# Patient Record
Sex: MALE | Race: WHITE | NOT HISPANIC OR LATINO | URBAN - METROPOLITAN AREA
[De-identification: names, ages, dates, MRNs, and addresses within clinical notes are randomized per-mention and may not be internally consistent; named-entity substitution may affect disease eponyms.]

---

## 2023-03-02 LAB
ANION GAP IN SER/PLAS: 14 MMOL/L (ref 10–20)
CALCIUM (MG/DL) IN SER/PLAS: 9.6 MG/DL (ref 8.6–10.6)
CARBON DIOXIDE, TOTAL (MMOL/L) IN SER/PLAS: 29 MMOL/L (ref 21–32)
CHLORIDE (MMOL/L) IN SER/PLAS: 99 MMOL/L (ref 98–107)
CREATININE (MG/DL) IN SER/PLAS: 1.55 MG/DL (ref 0.5–1.3)
GFR MALE: 43 ML/MIN/1.73M2
GLUCOSE (MG/DL) IN SER/PLAS: 185 MG/DL (ref 74–99)
POTASSIUM (MMOL/L) IN SER/PLAS: 4.5 MMOL/L (ref 3.5–5.3)
SODIUM (MMOL/L) IN SER/PLAS: 137 MMOL/L (ref 136–145)
UREA NITROGEN (MG/DL) IN SER/PLAS: 66 MG/DL (ref 6–23)

## 2023-04-17 ENCOUNTER — APPOINTMENT (OUTPATIENT)
Dept: PRIMARY CARE | Facility: CLINIC | Age: 88
End: 2023-04-17
Payer: MEDICARE

## 2023-04-21 ENCOUNTER — OFFICE VISIT (OUTPATIENT)
Dept: PRIMARY CARE | Facility: CLINIC | Age: 88
End: 2023-04-21
Payer: MEDICARE

## 2023-04-21 VITALS
BODY MASS INDEX: 25.87 KG/M2 | WEIGHT: 191 LBS | HEART RATE: 68 BPM | DIASTOLIC BLOOD PRESSURE: 77 MMHG | OXYGEN SATURATION: 95 % | SYSTOLIC BLOOD PRESSURE: 146 MMHG | HEIGHT: 72 IN

## 2023-04-21 DIAGNOSIS — T81.31XS WOUND DEHISCENCE, SURGICAL, SEQUELA: ICD-10-CM

## 2023-04-21 DIAGNOSIS — I38 VALVULAR INCOMPETENCE: ICD-10-CM

## 2023-04-21 DIAGNOSIS — I50.42 CHRONIC COMBINED SYSTOLIC AND DIASTOLIC CONGESTIVE HEART FAILURE (MULTI): Primary | ICD-10-CM

## 2023-04-21 PROCEDURE — 99214 OFFICE O/P EST MOD 30 MIN: CPT | Performed by: FAMILY MEDICINE

## 2023-04-21 RX ORDER — TORSEMIDE 20 MG/1
40 TABLET ORAL 2 TIMES DAILY
COMMUNITY
End: 2023-10-08 | Stop reason: HOSPADM

## 2023-04-21 RX ORDER — POTASSIUM CHLORIDE 750 MG/1
1 TABLET, EXTENDED RELEASE ORAL DAILY
COMMUNITY
End: 2023-10-08 | Stop reason: HOSPADM

## 2023-04-21 RX ORDER — APIXABAN 2.5 MG/1
1 TABLET, FILM COATED ORAL 2 TIMES DAILY
COMMUNITY
Start: 2021-09-16

## 2023-04-21 RX ORDER — TAMSULOSIN HYDROCHLORIDE 0.4 MG/1
0.4 CAPSULE ORAL 2 TIMES DAILY
COMMUNITY

## 2023-04-21 ASSESSMENT — ENCOUNTER SYMPTOMS
WOUND: 1
RESPIRATORY NEGATIVE: 1
GASTROINTESTINAL NEGATIVE: 1
CONSTITUTIONAL NEGATIVE: 1

## 2023-04-21 NOTE — PROGRESS NOTES
Subjective   Patient ID: Rajednra Chavis is a 87 y.o. male who presents for Hospital Follow-up.  HPI  Chf valvuler heart dx patient has a history of congestive heart failure was recently in the hospital from fluid overload this was treated patient developed sores in bilateral legs at this point he is going to the wound clinic has a wound VAC in bilateral lower extremities.  Patient has no tachypnea at this point no chest pain no palpitations.  Patient's valvular heart disease is such that he is going to eventually need a TAVR procedure this can be done at  were not can to follow along with him at this point he is not in overt congestive heart failure  Review of Systems   Constitutional: Negative.    HENT: Negative.     Respiratory: Negative.     Gastrointestinal: Negative.    Skin:  Positive for wound.       Objective   Physical Exam  General no acute process no icterus well-hydrated alert active oriented    HEENT normocephalic no palpable tenderness eyes pupils equal reactive light and accommodation extraocular muscles intact no icterus and/or erythema ears benign external auditory canal no gross deformities nose no discharge drainage erythema bleeding throat no erythema.    Heart regular rate and rhythm without S3-S4 pos murmur    Lungs clear to auscultation x2 no rales or rhonchi    Abdomen soft nontender nondistended no palpable masses no organomegaly splenomegaly.    Integument no rash no lumps bumps or concerning lesions.    Neurologic no tics tremors or seizures no decreased range of motion or ataxia.    Musculoskeletal good range of motion no gross abnormalities noted    Patient has bilateral legs wrapped wound VAC in the right anterior aspect of the leg.  Assessment/Plan

## 2023-04-26 LAB
ALBUMIN (G/DL) IN SER/PLAS: 3.4 G/DL (ref 3.4–5)
ANION GAP IN SER/PLAS: 11 MMOL/L (ref 10–20)
CALCIUM (MG/DL) IN SER/PLAS: 9.2 MG/DL (ref 8.6–10.6)
CARBON DIOXIDE, TOTAL (MMOL/L) IN SER/PLAS: 32 MMOL/L (ref 21–32)
CHLORIDE (MMOL/L) IN SER/PLAS: 95 MMOL/L (ref 98–107)
CREATININE (MG/DL) IN SER/PLAS: 1.19 MG/DL (ref 0.5–1.3)
GFR MALE: 59 ML/MIN/1.73M2
GLUCOSE (MG/DL) IN SER/PLAS: 170 MG/DL (ref 74–99)
NATRIURETIC PEPTIDE B (PG/ML) IN SER/PLAS: 640 PG/ML (ref 0–99)
PHOSPHATE (MG/DL) IN SER/PLAS: 3.7 MG/DL (ref 2.5–4.9)
POTASSIUM (MMOL/L) IN SER/PLAS: 4.4 MMOL/L (ref 3.5–5.3)
SODIUM (MMOL/L) IN SER/PLAS: 134 MMOL/L (ref 136–145)
UREA NITROGEN (MG/DL) IN SER/PLAS: 46 MG/DL (ref 6–23)

## 2023-05-05 LAB
ALBUMIN (G/DL) IN SER/PLAS: 3.7 G/DL (ref 3.4–5)
ANION GAP IN SER/PLAS: 14 MMOL/L (ref 10–20)
CALCIUM (MG/DL) IN SER/PLAS: 9.7 MG/DL (ref 8.6–10.6)
CARBON DIOXIDE, TOTAL (MMOL/L) IN SER/PLAS: 32 MMOL/L (ref 21–32)
CHLORIDE (MMOL/L) IN SER/PLAS: 95 MMOL/L (ref 98–107)
CREATININE (MG/DL) IN SER/PLAS: 1.41 MG/DL (ref 0.5–1.3)
GFR MALE: 48 ML/MIN/1.73M2
GLUCOSE (MG/DL) IN SER/PLAS: 109 MG/DL (ref 74–99)
PHOSPHATE (MG/DL) IN SER/PLAS: 4.2 MG/DL (ref 2.5–4.9)
POTASSIUM (MMOL/L) IN SER/PLAS: 4.1 MMOL/L (ref 3.5–5.3)
SODIUM (MMOL/L) IN SER/PLAS: 137 MMOL/L (ref 136–145)
UREA NITROGEN (MG/DL) IN SER/PLAS: 58 MG/DL (ref 6–23)

## 2023-05-08 ENCOUNTER — HOSPITAL ENCOUNTER (OUTPATIENT)
Dept: DATA CONVERSION | Facility: HOSPITAL | Age: 88
End: 2023-05-08
Attending: INTERNAL MEDICINE | Admitting: INTERNAL MEDICINE
Payer: MEDICARE

## 2023-05-08 DIAGNOSIS — I36.1 NONRHEUMATIC TRICUSPID (VALVE) INSUFFICIENCY: ICD-10-CM

## 2023-05-08 DIAGNOSIS — Z79.01 LONG TERM (CURRENT) USE OF ANTICOAGULANTS: ICD-10-CM

## 2023-05-08 DIAGNOSIS — I38 ENDOCARDITIS, VALVE UNSPECIFIED: ICD-10-CM

## 2023-05-08 DIAGNOSIS — I48.91 UNSPECIFIED ATRIAL FIBRILLATION (MULTI): ICD-10-CM

## 2023-05-08 DIAGNOSIS — E78.5 HYPERLIPIDEMIA, UNSPECIFIED: ICD-10-CM

## 2023-05-08 DIAGNOSIS — I35.1 NONRHEUMATIC AORTIC (VALVE) INSUFFICIENCY: ICD-10-CM

## 2023-05-08 DIAGNOSIS — R07.9 CHEST PAIN, UNSPECIFIED: ICD-10-CM

## 2023-05-08 DIAGNOSIS — I25.10 ATHEROSCLEROTIC HEART DISEASE OF NATIVE CORONARY ARTERY WITHOUT ANGINA PECTORIS: ICD-10-CM

## 2023-05-08 DIAGNOSIS — Z87.891 PERSONAL HISTORY OF NICOTINE DEPENDENCE: ICD-10-CM

## 2023-05-08 DIAGNOSIS — I73.9 PERIPHERAL VASCULAR DISEASE, UNSPECIFIED (CMS-HCC): ICD-10-CM

## 2023-05-08 DIAGNOSIS — I34.0 NONRHEUMATIC MITRAL (VALVE) INSUFFICIENCY: ICD-10-CM

## 2023-05-08 DIAGNOSIS — I10 ESSENTIAL (PRIMARY) HYPERTENSION: ICD-10-CM

## 2023-05-08 LAB
ACTIVATED PARTIAL THROMBOPLASTIN TIME IN PPP BY COAGULATION ASSAY: 29 SEC (ref 26–39)
ANION GAP IN SER/PLAS: 13 MMOL/L (ref 10–20)
APPARATUS: ABNORMAL
APPARATUS: NORMAL
CALCIUM (MG/DL) IN SER/PLAS: 9 MG/DL (ref 8.6–10.3)
CARBON DIOXIDE, TOTAL (MMOL/L) IN SER/PLAS: 28 MMOL/L (ref 21–32)
CHLORIDE (MMOL/L) IN SER/PLAS: 97 MMOL/L (ref 98–107)
CREATININE (MG/DL) IN SER/PLAS: 1.3 MG/DL (ref 0.5–1.3)
ERYTHROCYTE DISTRIBUTION WIDTH (RATIO) BY AUTOMATED COUNT: 16.5 % (ref 11.5–14.5)
ERYTHROCYTE MEAN CORPUSCULAR HEMOGLOBIN CONCENTRATION (G/DL) BY AUTOMATED: 32 G/DL (ref 32–36)
ERYTHROCYTE MEAN CORPUSCULAR VOLUME (FL) BY AUTOMATED COUNT: 90 FL (ref 80–100)
ERYTHROCYTES (10*6/UL) IN BLOOD BY AUTOMATED COUNT: 4.07 X10E12/L (ref 4.5–5.9)
FIO2: 28 %
FIO2: 28 % (ref 21–100)
GFR MALE: 53 ML/MIN/1.73M2
GLUCOSE (MG/DL) IN SER/PLAS: 99 MG/DL (ref 74–99)
HEMATOCRIT (%) IN BLOOD BY AUTOMATED COUNT: 36.6 % (ref 41–52)
HEMOGLOBIN (G/DL) IN BLOOD: 11.7 G/DL (ref 13.5–17.5)
INR IN PPP BY COAGULATION ASSAY: 1.5 (ref 0.9–1.1)
LEUKOCYTES (10*3/UL) IN BLOOD BY AUTOMATED COUNT: 8.9 X10E9/L (ref 4.4–11.3)
NRBC (PER 100 WBCS) BY AUTOMATED COUNT: 0 /100 WBC (ref 0–0)
PATIENT TEMPERATURE: 37 DEGREES
PATIENT TEMPERATURE: 37 DEGREES C
PLATELETS (10*3/UL) IN BLOOD AUTOMATED COUNT: 177 X10E9/L (ref 150–450)
POCT BASE EXCESS, ARTERIAL: 6.1 MMOL/L (ref -2–3)
POCT BASE EXCESS, MIXED: 6.9 MMOL/L
POCT HCO3, ARTERIAL: 29.6 MMOL/L (ref 22–26)
POCT HCO3, MIXED: 32.5 MMOL/L
POCT OXY HEMOGLOBIN, ARTERIAL: 94.5 % (ref 94–98)
POCT OXY HEMOGLOBIN, MIXED: 60.4 % (ref 45–75)
POCT PCO2, ARTERIAL: 38 MMHG (ref 38–42)
POCT PCO2, MIXED: 49 MMHG
POCT PH, ARTERIAL: 7.5 (ref 7.38–7.42)
POCT PH, MIXED: 7.43
POCT PO2, ARTERIAL: 78 MMHG (ref 85–95)
POCT PO2, MIXED: 38 MMHG
POCT SO2, ARTERIAL: 97 % (ref 94–100)
POCT SO2, MIXED: 62 %
POTASSIUM (MMOL/L) IN SER/PLAS: 4.4 MMOL/L (ref 3.5–5.3)
PROTHROMBIN TIME (PT) IN PPP BY COAGULATION ASSAY: 17.5 SEC (ref 9.8–13.4)
SITE OF ARTERIAL PUNCTURE: ABNORMAL
SODIUM (MMOL/L) IN SER/PLAS: 134 MMOL/L (ref 136–145)
UREA NITROGEN (MG/DL) IN SER/PLAS: 50 MG/DL (ref 6–23)

## 2023-05-14 LAB
ATRIAL RATE: 288 BPM
Q ONSET: 207 MS
QRS COUNT: 10 BEATS
QRS DURATION: 110 MS
QT INTERVAL: 458 MS
QTC CALCULATION(BAZETT): 461 MS
QTC FREDERICIA: 460 MS
R AXIS: -57 DEGREES
T AXIS: 70 DEGREES
T OFFSET: 436 MS
VENTRICULAR RATE: 61 BPM

## 2023-06-14 ENCOUNTER — TELEPHONE (OUTPATIENT)
Dept: PRIMARY CARE | Facility: CLINIC | Age: 88
End: 2023-06-14
Payer: MEDICARE

## 2023-06-14 NOTE — TELEPHONE ENCOUNTER
Patient's daughter called. They would like a referral to Audiology for hearing test for patient. Also would like to know if there is a specific Dr you recommend.

## 2023-06-20 LAB
ALBUMIN (G/DL) IN SER/PLAS: 3.6 G/DL (ref 3.4–5)
ANION GAP IN SER/PLAS: 12 MMOL/L (ref 10–20)
CALCIUM (MG/DL) IN SER/PLAS: 9.4 MG/DL (ref 8.6–10.6)
CARBON DIOXIDE, TOTAL (MMOL/L) IN SER/PLAS: 31 MMOL/L (ref 21–32)
CHLORIDE (MMOL/L) IN SER/PLAS: 97 MMOL/L (ref 98–107)
CREATININE (MG/DL) IN SER/PLAS: 1.33 MG/DL (ref 0.5–1.3)
GFR MALE: 51 ML/MIN/1.73M2
GLUCOSE (MG/DL) IN SER/PLAS: 123 MG/DL (ref 74–99)
NATRIURETIC PEPTIDE B (PG/ML) IN SER/PLAS: 570 PG/ML (ref 0–99)
PHOSPHATE (MG/DL) IN SER/PLAS: 3.7 MG/DL (ref 2.5–4.9)
POTASSIUM (MMOL/L) IN SER/PLAS: 3.7 MMOL/L (ref 3.5–5.3)
SODIUM (MMOL/L) IN SER/PLAS: 136 MMOL/L (ref 136–145)
UREA NITROGEN (MG/DL) IN SER/PLAS: 57 MG/DL (ref 6–23)

## 2023-07-24 ENCOUNTER — HOSPITAL ENCOUNTER (OUTPATIENT)
Dept: DATA CONVERSION | Facility: HOSPITAL | Age: 88
End: 2023-07-24
Attending: SURGERY | Admitting: SURGERY
Payer: MEDICARE

## 2023-07-24 DIAGNOSIS — L97.819 NON-PRESSURE CHRONIC ULCER OF OTHER PART OF RIGHT LOWER LEG WITH UNSPECIFIED SEVERITY (MULTI): ICD-10-CM

## 2023-07-24 DIAGNOSIS — L97.919 NON-PRESSURE CHRONIC ULCER OF UNSPECIFIED PART OF RIGHT LOWER LEG WITH UNSPECIFIED SEVERITY (MULTI): ICD-10-CM

## 2023-09-14 NOTE — H&P
History & Physical Reviewed:   I have reviewed the History and Physical dated:  24-Apr-2023   History and Physical reviewed and relevant findings noted. Patient examined to review pertinent physical  findings.: No significant changes   Home Medications Reviewed: no changes noted   Allergies Reviewed: no changes noted       Airway/Sedation Assessment:  ·  Emotional Status calm   ·  Neurologic alert & oriented x 3   ·  Respiratory clear to auscultation   ·  Cardiovascular Afib, HR 61.   ·  GI/ soft, nontender     · Pulses present: Radial Left, Radial Right    AS [facility] phys assess pulse FT DRSG to ju LE.     ·  Mouth Opening OK yes   ·  Neck Flexibility OK yes   ·  Loose Teeth no     Oropharyngeal Classification:          ·  Oropharyngeal Classification Class II   ·  ASA PS Classification ASA III   ·  Sedation Plan moderate sedation       ERAS (Enhanced Recovery After Surgery):  ·  ERAS Patient: no     Consent:   COVID-19 Consent:  ·  COVID-19 Risk Consent Surgeon has reviewed key risks related to the risk of jordana COVID-19 and if they contract COVID-19 what the risks are.     Coronavirus Attestation of Need for Surgery:  ·  COVID-19 Surgery / Procedure Attestation: Select all criteria that apply Presence of severe symptoms causing an inability to perform activities of daily living       Electronic Signatures:  Jennifer Day (APRN-CNP)  (Signed 08-May-2023 08:16)   Authored: History & Physical Reviewed, Airway/Sedation,  ERAS, Consent, Note Completion  Vinicius Jiménez)  (Signed 09-May-2023 03:42)   Authored: Consent   Co-Signer: History & Physical Reviewed, Airway/Sedation, ERAS, Consent, Note Completion      Last Updated: 09-May-2023 03:42 by Vinicius Jiménez)

## 2023-09-21 ENCOUNTER — HOSPITAL ENCOUNTER (OUTPATIENT)
Facility: HOSPITAL | Age: 88
Setting detail: SURGERY ADMIT
End: 2023-09-21
Attending: INTERNAL MEDICINE | Admitting: INTERNAL MEDICINE
Payer: MEDICARE

## 2023-09-29 ENCOUNTER — PREP FOR PROCEDURE (OUTPATIENT)
Dept: CARDIOLOGY | Facility: HOSPITAL | Age: 88
End: 2023-09-29
Payer: MEDICARE

## 2023-09-30 NOTE — H&P
History & Physical Reviewed:   I have reviewed the History and Physical dated:  21-Jul-2023   History and Physical reviewed and relevant findings noted. Patient examined to review pertinent physical  findings.: No significant changes   Home Medications Reviewed: no changes noted   Allergies Reviewed: no changes noted       ERAS (Enhanced Recovery After Surgery):  ·  ERAS Patient: no     Consent:   COVID-19 Consent:  ·  COVID-19 Risk Consent Surgeon has reviewed key risks related to the risk of jordana COVID-19 and if they contract COVID-19 what the risks are.       Electronic Signatures:  Jean Benjamin ()  (Signed 24-Jul-2023 10:10)   Authored: History & Physical Reviewed, ERAS, Consent,  Note Completion      Last Updated: 24-Jul-2023 10:10 by Jean Benjamin ()

## 2023-10-02 NOTE — OP NOTE
Post Operative Note:     Post-Procedure Diagnosis: Chronic right lower extremity  wound   Procedure: 1.  Excisional debridement of right lower  lateral leg ulcer for preparation for graft (9 cm x 5.5 cm)  2.  Placement of myriad graft 7 cm x 10 cm right lower leg ulcer  3.   4.   5.   Surgeon: Dr. Benjamin   Resident/Fellow/Other Assistant: Raysa rader   Anesthesia: MAC   Estimated Blood Loss (mL): Minimal   Specimen: yes. Slough   Findings: Same       Electronic Signatures:  Jean Benjamin ()  (Signed 24-Jul-2023 11:32)   Authored: Post Operative Note, Note Completion      Last Updated: 24-Jul-2023 11:32 by Jean Benjamin ()

## 2023-10-03 ENCOUNTER — TELEPHONE (OUTPATIENT)
Dept: CARDIOLOGY | Facility: CLINIC | Age: 88
End: 2023-10-03
Payer: MEDICARE

## 2023-10-03 NOTE — TELEPHONE ENCOUNTER
"Daughter, Purnima Long called stating patient has had an overall decline AEB: decreased appetite, increased sleeping \"all day\", frequent episodes of diarrhea. Dtr feels abdomen is increasing in size \"bloated-like\". Patient's weight remains same, 203#. Patient goals of care have not changed and pt/family would like to proceed with valve repair scheduled on 10/19 and are concerned it will be delayed due to leg wound. Stated patient has appt with Dr. Andres 10/4.  "

## 2023-10-04 ENCOUNTER — CLINICAL SUPPORT (OUTPATIENT)
Dept: WOUND CARE | Facility: CLINIC | Age: 88
DRG: 293 | End: 2023-10-04
Payer: MEDICARE

## 2023-10-04 PROCEDURE — 99213 OFFICE O/P EST LOW 20 MIN: CPT

## 2023-10-05 ENCOUNTER — HOSPITAL ENCOUNTER (INPATIENT)
Facility: HOSPITAL | Age: 88
LOS: 2 days | Discharge: ANOTHER HOSPITAL | DRG: 293 | End: 2023-10-08
Attending: EMERGENCY MEDICINE | Admitting: INTERNAL MEDICINE
Payer: MEDICARE

## 2023-10-05 ENCOUNTER — HOSPITAL ENCOUNTER (OUTPATIENT)
Dept: RADIOLOGY | Facility: HOSPITAL | Age: 88
Discharge: HOME | DRG: 293 | End: 2023-10-05
Payer: MEDICARE

## 2023-10-05 DIAGNOSIS — I38 HEART FAILURE DUE TO VALVULAR DISEASE, UNSPECIFIED HEART FAILURE TYPE (MULTI): Primary | ICD-10-CM

## 2023-10-05 DIAGNOSIS — I50.9 HEART FAILURE DUE TO VALVULAR DISEASE, UNSPECIFIED HEART FAILURE TYPE (MULTI): Primary | ICD-10-CM

## 2023-10-05 DIAGNOSIS — I50.43 ACUTE ON CHRONIC COMBINED SYSTOLIC AND DIASTOLIC HEART FAILURE (MULTI): Primary | ICD-10-CM

## 2023-10-05 PROBLEM — I35.9 AORTIC VALVE DISORDER: Status: ACTIVE | Noted: 2023-03-21

## 2023-10-05 PROBLEM — I83.002: Status: ACTIVE | Noted: 2023-10-05

## 2023-10-05 PROBLEM — I34.0 NONRHEUMATIC MITRAL VALVE REGURGITATION: Status: ACTIVE | Noted: 2023-10-05

## 2023-10-05 PROBLEM — L03.90 CELLULITIS: Status: ACTIVE | Noted: 2023-10-05

## 2023-10-05 PROBLEM — I87.2 CHRONIC VENOUS INSUFFICIENCY: Status: ACTIVE | Noted: 2023-10-05

## 2023-10-05 PROBLEM — F41.9 ANXIETY AND DEPRESSION: Status: ACTIVE | Noted: 2023-10-05

## 2023-10-05 PROBLEM — I05.9 MITRAL VALVE DISEASE: Status: ACTIVE | Noted: 2023-03-21

## 2023-10-05 PROBLEM — L60.0 ONYCHOCRYPTOSIS: Status: ACTIVE | Noted: 2023-10-05

## 2023-10-05 PROBLEM — I07.1 TRICUSPID REGURGITATION: Status: ACTIVE | Noted: 2023-10-05

## 2023-10-05 PROBLEM — N18.30 CHRONIC KIDNEY DISEASE, STAGE 3 UNSPECIFIED (MULTI): Status: ACTIVE | Noted: 2023-03-29

## 2023-10-05 PROBLEM — L97.909 ULCER OF LOWER EXTREMITY (MULTI): Status: ACTIVE | Noted: 2023-10-05

## 2023-10-05 PROBLEM — L03.116 CELLULITIS OF LEFT LOWER LIMB: Status: ACTIVE | Noted: 2023-03-29

## 2023-10-05 PROBLEM — I48.91 ATRIAL FIBRILLATION (MULTI): Status: ACTIVE | Noted: 2023-03-29

## 2023-10-05 PROBLEM — R79.89 ELEVATED BRAIN NATRIURETIC PEPTIDE (BNP) LEVEL: Status: ACTIVE | Noted: 2023-10-05

## 2023-10-05 PROBLEM — N40.0 BENIGN PROSTATE HYPERPLASIA: Status: ACTIVE | Noted: 2023-10-05

## 2023-10-05 PROBLEM — F32.A ANXIETY AND DEPRESSION: Status: ACTIVE | Noted: 2023-10-05

## 2023-10-05 PROBLEM — L97.209: Status: ACTIVE | Noted: 2023-10-05

## 2023-10-05 PROBLEM — F33.9 MAJOR DEPRESSIVE DISORDER, RECURRENT, UNSPECIFIED (CMS-HCC): Status: ACTIVE | Noted: 2023-03-29

## 2023-10-05 PROBLEM — I35.1 MODERATE AORTIC REGURGITATION: Status: ACTIVE | Noted: 2023-10-05

## 2023-10-05 PROBLEM — I73.9 PAD (PERIPHERAL ARTERY DISEASE) (CMS-HCC): Status: ACTIVE | Noted: 2023-10-05

## 2023-10-05 LAB
ALBUMIN SERPL BCP-MCNC: 3.2 G/DL (ref 3.4–5)
ALP SERPL-CCNC: 102 U/L (ref 33–136)
ALT SERPL W P-5'-P-CCNC: 14 U/L (ref 10–52)
ANION GAP SERPL CALC-SCNC: 15 MMOL/L (ref 10–20)
APTT PPP: 43 SECONDS (ref 27–38)
AST SERPL W P-5'-P-CCNC: 23 U/L (ref 9–39)
BASOPHILS # BLD AUTO: 0.06 X10*3/UL (ref 0–0.1)
BASOPHILS NFR BLD AUTO: 0.9 %
BILIRUB DIRECT SERPL-MCNC: 0.7 MG/DL (ref 0–0.3)
BILIRUB SERPL-MCNC: 1.6 MG/DL (ref 0–1.2)
BNP SERPL-MCNC: 506 PG/ML (ref 0–99)
BUN SERPL-MCNC: 77 MG/DL (ref 6–23)
BURR CELLS BLD QL SMEAR: NORMAL
CALCIUM SERPL-MCNC: 9.1 MG/DL (ref 8.6–10.3)
CARDIAC TROPONIN I PNL SERPL HS: 22 NG/L (ref 0–20)
CHLORIDE SERPL-SCNC: 98 MMOL/L (ref 98–107)
CO2 SERPL-SCNC: 26 MMOL/L (ref 21–32)
CREAT SERPL-MCNC: 1.47 MG/DL (ref 0.5–1.3)
EOSINOPHIL # BLD AUTO: 0.05 X10*3/UL (ref 0–0.4)
EOSINOPHIL NFR BLD AUTO: 0.7 %
ERYTHROCYTE [DISTWIDTH] IN BLOOD BY AUTOMATED COUNT: 21.7 % (ref 11.5–14.5)
GFR SERPL CREATININE-BSD FRML MDRD: 46 ML/MIN/1.73M*2
GLUCOSE SERPL-MCNC: 79 MG/DL (ref 74–99)
HCT VFR BLD AUTO: 40.1 % (ref 41–52)
HGB BLD-MCNC: 12.3 G/DL (ref 13.5–17.5)
IMM GRANULOCYTES # BLD AUTO: 0.02 X10*3/UL (ref 0–0.5)
IMM GRANULOCYTES NFR BLD AUTO: 0.3 % (ref 0–0.9)
INR PPP: 2.3 (ref 0.9–1.1)
LIPASE SERPL-CCNC: 78 U/L (ref 9–82)
LYMPHOCYTES # BLD AUTO: 1.39 X10*3/UL (ref 0.8–3)
LYMPHOCYTES NFR BLD AUTO: 20.3 %
MAGNESIUM SERPL-MCNC: 2.23 MG/DL (ref 1.6–2.4)
MCH RBC QN AUTO: 23.2 PG (ref 26–34)
MCHC RBC AUTO-ENTMCNC: 30.7 G/DL (ref 32–36)
MCV RBC AUTO: 76 FL (ref 80–100)
MONOCYTES # BLD AUTO: 0.82 X10*3/UL (ref 0.05–0.8)
MONOCYTES NFR BLD AUTO: 12 %
NEUTROPHILS # BLD AUTO: 4.5 X10*3/UL (ref 1.6–5.5)
NEUTROPHILS NFR BLD AUTO: 65.8 %
NRBC BLD-RTO: 0.3 /100 WBCS (ref 0–0)
OVALOCYTES BLD QL SMEAR: NORMAL
PLATELET # BLD AUTO: 93 X10*3/UL (ref 150–450)
PMV BLD AUTO: ABNORMAL FL
POTASSIUM SERPL-SCNC: 4 MMOL/L (ref 3.5–5.3)
PROT SERPL-MCNC: 7 G/DL (ref 6.4–8.2)
PROTHROMBIN TIME: 26.1 SECONDS (ref 9.8–12.8)
RBC # BLD AUTO: 5.31 X10*6/UL (ref 4.5–5.9)
RBC MORPH BLD: NORMAL
SCHISTOCYTES BLD QL SMEAR: NORMAL
SODIUM SERPL-SCNC: 135 MMOL/L (ref 136–145)
WBC # BLD AUTO: 6.8 X10*3/UL (ref 4.4–11.3)

## 2023-10-05 PROCEDURE — 2500000004 HC RX 250 GENERAL PHARMACY W/ HCPCS (ALT 636 FOR OP/ED): Performed by: EMERGENCY MEDICINE

## 2023-10-05 PROCEDURE — 36415 COLL VENOUS BLD VENIPUNCTURE: CPT | Performed by: NURSE PRACTITIONER

## 2023-10-05 PROCEDURE — 71046 X-RAY EXAM CHEST 2 VIEWS: CPT | Performed by: RADIOLOGY

## 2023-10-05 PROCEDURE — 2500000001 HC RX 250 WO HCPCS SELF ADMINISTERED DRUGS (ALT 637 FOR MEDICARE OP): Performed by: EMERGENCY MEDICINE

## 2023-10-05 PROCEDURE — 82248 BILIRUBIN DIRECT: CPT | Performed by: NURSE PRACTITIONER

## 2023-10-05 PROCEDURE — 85025 COMPLETE CBC W/AUTO DIFF WBC: CPT | Performed by: NURSE PRACTITIONER

## 2023-10-05 PROCEDURE — 96374 THER/PROPH/DIAG INJ IV PUSH: CPT

## 2023-10-05 PROCEDURE — 96376 TX/PRO/DX INJ SAME DRUG ADON: CPT

## 2023-10-05 PROCEDURE — 93971 EXTREMITY STUDY: CPT

## 2023-10-05 PROCEDURE — 83690 ASSAY OF LIPASE: CPT | Performed by: NURSE PRACTITIONER

## 2023-10-05 PROCEDURE — 84484 ASSAY OF TROPONIN QUANT: CPT | Performed by: NURSE PRACTITIONER

## 2023-10-05 PROCEDURE — 80048 BASIC METABOLIC PNL TOTAL CA: CPT | Performed by: NURSE PRACTITIONER

## 2023-10-05 PROCEDURE — 93971 EXTREMITY STUDY: CPT | Performed by: SURGERY

## 2023-10-05 PROCEDURE — 85730 THROMBOPLASTIN TIME PARTIAL: CPT | Performed by: NURSE PRACTITIONER

## 2023-10-05 PROCEDURE — 85610 PROTHROMBIN TIME: CPT | Performed by: NURSE PRACTITIONER

## 2023-10-05 PROCEDURE — 83735 ASSAY OF MAGNESIUM: CPT | Performed by: NURSE PRACTITIONER

## 2023-10-05 PROCEDURE — 83880 ASSAY OF NATRIURETIC PEPTIDE: CPT | Performed by: NURSE PRACTITIONER

## 2023-10-05 PROCEDURE — 99285 EMERGENCY DEPT VISIT HI MDM: CPT | Mod: 25 | Performed by: EMERGENCY MEDICINE

## 2023-10-05 RX ORDER — TAMSULOSIN HYDROCHLORIDE 0.4 MG/1
0.4 CAPSULE ORAL ONCE
Status: COMPLETED | OUTPATIENT
Start: 2023-10-05 | End: 2023-10-05

## 2023-10-05 RX ORDER — TAMSULOSIN HYDROCHLORIDE 0.4 MG/1
0.4 CAPSULE ORAL 2 TIMES DAILY
Status: DISCONTINUED | OUTPATIENT
Start: 2023-10-06 | End: 2023-10-08 | Stop reason: HOSPADM

## 2023-10-05 RX ORDER — FUROSEMIDE 10 MG/ML
40 INJECTION INTRAMUSCULAR; INTRAVENOUS ONCE
Status: COMPLETED | OUTPATIENT
Start: 2023-10-05 | End: 2023-10-05

## 2023-10-05 RX ORDER — VIT C/E/ZN/COPPR/LUTEIN/ZEAXAN 250MG-90MG
1 CAPSULE ORAL DAILY
COMMUNITY

## 2023-10-05 RX ADMIN — APIXABAN 2.5 MG: 5 TABLET, FILM COATED ORAL at 19:41

## 2023-10-05 RX ADMIN — TAMSULOSIN HYDROCHLORIDE 0.4 MG: 0.4 CAPSULE ORAL at 19:41

## 2023-10-05 RX ADMIN — FUROSEMIDE 40 MG: 10 INJECTION, SOLUTION INTRAMUSCULAR; INTRAVENOUS at 19:41

## 2023-10-05 ASSESSMENT — COLUMBIA-SUICIDE SEVERITY RATING SCALE - C-SSRS
1. IN THE PAST MONTH, HAVE YOU WISHED YOU WERE DEAD OR WISHED YOU COULD GO TO SLEEP AND NOT WAKE UP?: NO
6. HAVE YOU EVER DONE ANYTHING, STARTED TO DO ANYTHING, OR PREPARED TO DO ANYTHING TO END YOUR LIFE?: NO
2. HAVE YOU ACTUALLY HAD ANY THOUGHTS OF KILLING YOURSELF?: NO

## 2023-10-05 ASSESSMENT — PAIN SCALES - GENERAL: PAINLEVEL_OUTOF10: 0 - NO PAIN

## 2023-10-05 ASSESSMENT — PAIN - FUNCTIONAL ASSESSMENT: PAIN_FUNCTIONAL_ASSESSMENT: 0-10

## 2023-10-05 NOTE — ED TRIAGE NOTES
This patient was seen and examined in triage    HPI:  Patient is a nontoxic-appearing 88-year-old male with past medical history of anxiety depression, aortic valve disorder, mitral valve disease, tricuspid regurgitation, atrial fibrillation, BPH, cellulitis, stage III chronic kidney disease, congestive heart failure, nonrheumatic mitral valve regurgitation, presents to the emergency room today for complaint of shortness of breath and abdominal swelling.  Family is at the bedside and states they were told by patient's cardiologist to go to the emergency room for further evaluation of cardiac disease.  Patient's family states patient has been taking torsemide, Eliquis Flomax and potassium however swelling to the abdomen has persisted.  Patient denies any chest pain.  Patient does complain of shortness of breath is worse with exertion.  Patient denies any abdominal pain, nausea vomiting, diarrhea or constipation, fever, shaking, or chills.    Focused PE:  Cardiac: Regular rate and rhythm  Pulmonary: No adventitious lung sounds auscultated  Abdomen: No reproducible abdominal pain note, +1 pitting edema diffusely across the abdomen, physical exam may be limited by patient positioning sitting up in a chair    Plan:  Cardiac evaluation ordered from triage.    For the remainder the patient's work-up and ED course, please see the main ED provider note.  We discussed need for diagnostic testing including lab studies and imaging.  We also discussed that they may be asked to wait in the waiting room while these test are pending.  They understand that if they choose to leave without having the testing completed or resulted that we cannot rule out acute life-threatening illnesses and the risks involved to lead to worsening condition, permanent disability or even death.

## 2023-10-05 NOTE — ED PROVIDER NOTES
Limitations to History: None     HPI:      Rajendra Chavis is a 88 y.o. with a past medical history of anxiety depression, aortic valve disorder, mitral valve disease, tricuspid regurgitation, atrial fibrillation, BPH, cellulitis, stage III chronic kidney disease, congestive heart failure, nonrheumatic mitral valve regurgitation who presents with increased SOB, fatigue and abdominal swelling. He was sent in from home by his cardiologist for evaluation and [place] [facility] [place] of [place] heart disease prior to mitral valve clip. He also has developed diarrhea this week. He does not talk much to me but says he does feel more tired than normal. He says he has been peeing when he takes his water pill.     His daughters are at bedside and say the pt's wife reports more dyspnea on exertion, diarrhea, and new RUE swelling.     Additional History Obtained from: Daughters    ------------------------------------------------------------------------------------------------------------------------------------------    VS: As documented in the triage note and EMR flowsheet from this visit were reviewed.    Physical Exam:  Gen: Alert, NAD  Head/Neck: NCAT, neck w/ FROM  Mouth:  MMM, no OP lesions noted  Heart: RRR no MRG  Lungs: CTA b/l no RRW, no increased work of breathing  Abdomen: soft, NT, +distended, no HSM, no palpable masses  Musculoskeletal: no joint swelling noted  Extremities: +2 pitting edema to the mid thigh, RUE swelling to the hand with good pulses and perfusion  Neurologic: Alert, symmetrical facies, phonates clearly, moves all extremities equally    ------------------------------------------------------------------------------------------------------------------------------------------    Medical Decision Making: Patient presents emergency room with concern for acute heart failure exacerbation and right upper extremity swelling.  Right upper extremity DVT ultrasound does not show any acute abnormalities.   He he was excepted for transfer for optimization for his mitral valve surgery to [facility].  He was excepted almost immediately by Dr. Flynn, there is no bed so home medications were placed and the patient was started on diuresis here in the emergency department.  He will be signed out pending transport to [facility].         EKG interpreted by myself (ED attending physician): Patient is in A-fib with a bradycardic rhythm around 61, he has a left bundle branch block and normal ST segments otherwise    Chronic Medical Conditions Significantly Affecting Care: Mitral valve disease, atrial fibrillation, heart failure    External Records Reviewed: I reviewed recent and relevant outside records including: Recent cardiology notes by Dr. Mack    Discussion of Management with Other Providers:   I discussed the patient/results with: Transfer center and main [place] cardiology       Dale Ponce MD  10/06/23 0054

## 2023-10-06 PROBLEM — I50.43 ACUTE ON CHRONIC COMBINED SYSTOLIC AND DIASTOLIC HEART FAILURE (MULTI): Status: ACTIVE | Noted: 2023-10-06

## 2023-10-06 LAB
ALBUMIN SERPL BCP-MCNC: 3 G/DL (ref 3.4–5)
ALP SERPL-CCNC: 102 U/L (ref 33–136)
ALT SERPL W P-5'-P-CCNC: 15 U/L (ref 10–52)
ANION GAP SERPL CALC-SCNC: 12 MMOL/L (ref 10–20)
AST SERPL W P-5'-P-CCNC: 24 U/L (ref 9–39)
BILIRUB SERPL-MCNC: 1.3 MG/DL (ref 0–1.2)
BUN SERPL-MCNC: 75 MG/DL (ref 6–23)
CALCIUM SERPL-MCNC: 9 MG/DL (ref 8.6–10.3)
CARDIAC TROPONIN I PNL SERPL HS: 21 NG/L (ref 0–20)
CHLORIDE SERPL-SCNC: 99 MMOL/L (ref 98–107)
CO2 SERPL-SCNC: 28 MMOL/L (ref 21–32)
CREAT SERPL-MCNC: 1.51 MG/DL (ref 0.5–1.3)
ERYTHROCYTE [DISTWIDTH] IN BLOOD BY AUTOMATED COUNT: 21.3 % (ref 11.5–14.5)
GFR SERPL CREATININE-BSD FRML MDRD: 44 ML/MIN/1.73M*2
GLUCOSE SERPL-MCNC: 123 MG/DL (ref 74–99)
HCT VFR BLD AUTO: 38.2 % (ref 41–52)
HGB BLD-MCNC: 11.8 G/DL (ref 13.5–17.5)
MAGNESIUM SERPL-MCNC: 2.38 MG/DL (ref 1.6–2.4)
MCH RBC QN AUTO: 23.2 PG (ref 26–34)
MCHC RBC AUTO-ENTMCNC: 30.9 G/DL (ref 32–36)
MCV RBC AUTO: 75 FL (ref 80–100)
NRBC BLD-RTO: 0.3 /100 WBCS (ref 0–0)
PLATELET # BLD AUTO: 84 X10*3/UL (ref 150–450)
PMV BLD AUTO: ABNORMAL FL
POTASSIUM SERPL-SCNC: 4.2 MMOL/L (ref 3.5–5.3)
PROT SERPL-MCNC: 6.6 G/DL (ref 6.4–8.2)
RBC # BLD AUTO: 5.09 X10*6/UL (ref 4.5–5.9)
SODIUM SERPL-SCNC: 135 MMOL/L (ref 136–145)
WBC # BLD AUTO: 6.3 X10*3/UL (ref 4.4–11.3)

## 2023-10-06 PROCEDURE — 85027 COMPLETE CBC AUTOMATED: CPT | Performed by: INTERNAL MEDICINE

## 2023-10-06 PROCEDURE — 2500000004 HC RX 250 GENERAL PHARMACY W/ HCPCS (ALT 636 FOR OP/ED): Performed by: INTERNAL MEDICINE

## 2023-10-06 PROCEDURE — 84484 ASSAY OF TROPONIN QUANT: CPT | Performed by: INTERNAL MEDICINE

## 2023-10-06 PROCEDURE — 2060000001 HC INTERMEDIATE ICU ROOM DAILY

## 2023-10-06 PROCEDURE — 2500000004 HC RX 250 GENERAL PHARMACY W/ HCPCS (ALT 636 FOR OP/ED): Performed by: STUDENT IN AN ORGANIZED HEALTH CARE EDUCATION/TRAINING PROGRAM

## 2023-10-06 PROCEDURE — 99223 1ST HOSP IP/OBS HIGH 75: CPT | Performed by: INTERNAL MEDICINE

## 2023-10-06 PROCEDURE — 80053 COMPREHEN METABOLIC PANEL: CPT | Performed by: INTERNAL MEDICINE

## 2023-10-06 PROCEDURE — 36415 COLL VENOUS BLD VENIPUNCTURE: CPT | Performed by: INTERNAL MEDICINE

## 2023-10-06 PROCEDURE — 83735 ASSAY OF MAGNESIUM: CPT | Performed by: INTERNAL MEDICINE

## 2023-10-06 PROCEDURE — 2500000004 HC RX 250 GENERAL PHARMACY W/ HCPCS (ALT 636 FOR OP/ED): Performed by: EMERGENCY MEDICINE

## 2023-10-06 PROCEDURE — 2500000001 HC RX 250 WO HCPCS SELF ADMINISTERED DRUGS (ALT 637 FOR MEDICARE OP): Performed by: EMERGENCY MEDICINE

## 2023-10-06 RX ORDER — POLYETHYLENE GLYCOL 3350 17 G/17G
17 POWDER, FOR SOLUTION ORAL DAILY
Status: DISCONTINUED | OUTPATIENT
Start: 2023-10-06 | End: 2023-10-08 | Stop reason: HOSPADM

## 2023-10-06 RX ORDER — ACETAMINOPHEN 160 MG/5ML
650 SOLUTION ORAL EVERY 4 HOURS PRN
Status: DISCONTINUED | OUTPATIENT
Start: 2023-10-06 | End: 2023-10-08 | Stop reason: HOSPADM

## 2023-10-06 RX ORDER — ACETAMINOPHEN 325 MG/1
650 TABLET ORAL EVERY 4 HOURS PRN
Status: DISCONTINUED | OUTPATIENT
Start: 2023-10-06 | End: 2023-10-08 | Stop reason: HOSPADM

## 2023-10-06 RX ORDER — TALC
3 POWDER (GRAM) TOPICAL NIGHTLY PRN
Status: DISCONTINUED | OUTPATIENT
Start: 2023-10-06 | End: 2023-10-08 | Stop reason: HOSPADM

## 2023-10-06 RX ORDER — FUROSEMIDE 10 MG/ML
20 INJECTION INTRAMUSCULAR; INTRAVENOUS ONCE
Status: COMPLETED | OUTPATIENT
Start: 2023-10-06 | End: 2023-10-06

## 2023-10-06 RX ORDER — ACETAMINOPHEN 650 MG/1
650 SUPPOSITORY RECTAL EVERY 4 HOURS PRN
Status: DISCONTINUED | OUTPATIENT
Start: 2023-10-06 | End: 2023-10-08 | Stop reason: HOSPADM

## 2023-10-06 RX ADMIN — TAMSULOSIN HYDROCHLORIDE 0.4 MG: 0.4 CAPSULE ORAL at 21:00

## 2023-10-06 RX ADMIN — FUROSEMIDE 20 MG: 10 INJECTION, SOLUTION INTRAMUSCULAR; INTRAVENOUS at 18:50

## 2023-10-06 RX ADMIN — TAMSULOSIN HYDROCHLORIDE 0.4 MG: 0.4 CAPSULE ORAL at 08:54

## 2023-10-06 RX ADMIN — POLYETHYLENE GLYCOL 3350 17 G: 17 POWDER, FOR SOLUTION ORAL at 20:10

## 2023-10-06 RX ADMIN — APIXABAN 2.5 MG: 5 TABLET, FILM COATED ORAL at 21:00

## 2023-10-06 RX ADMIN — APIXABAN 2.5 MG: 5 TABLET, FILM COATED ORAL at 08:53

## 2023-10-06 RX ADMIN — FUROSEMIDE 5 MG/HR: 10 INJECTION, SOLUTION INTRAMUSCULAR; INTRAVENOUS at 23:35

## 2023-10-06 ASSESSMENT — LIFESTYLE VARIABLES
EVER HAD A DRINK FIRST THING IN THE MORNING TO STEADY YOUR NERVES TO GET RID OF A HANGOVER: NO
EVER FELT BAD OR GUILTY ABOUT YOUR DRINKING: NO
HAVE YOU EVER FELT YOU SHOULD CUT DOWN ON YOUR DRINKING: NO
HAVE PEOPLE ANNOYED YOU BY CRITICIZING YOUR DRINKING: NO

## 2023-10-06 ASSESSMENT — PAIN SCALES - GENERAL
PAINLEVEL_OUTOF10: 0 - NO PAIN
PAINLEVEL_OUTOF10: 3

## 2023-10-06 NOTE — ED NOTES
Patient resting in bed respirations non labored skin warm dry intact, vitals stable. RN report from leonila. Will continue to monitor      Marilia Lemus RN  10/06/23 1942

## 2023-10-07 PROBLEM — M79.671 PAIN IN BOTH FEET: Status: RESOLVED | Noted: 2023-10-07 | Resolved: 2023-10-07

## 2023-10-07 PROBLEM — I48.91 ATRIAL FIBRILLATION (MULTI): Status: RESOLVED | Noted: 2023-03-29 | Resolved: 2023-10-07

## 2023-10-07 PROBLEM — I87.8 VENOUS CONGESTION: Status: RESOLVED | Noted: 2023-10-07 | Resolved: 2023-10-07

## 2023-10-07 PROBLEM — K40.30 INCARCERATED RIGHT INGUINAL HERNIA: Status: RESOLVED | Noted: 2023-10-07 | Resolved: 2023-10-07

## 2023-10-07 PROBLEM — F32.A ANXIETY AND DEPRESSION: Status: RESOLVED | Noted: 2023-10-05 | Resolved: 2023-10-07

## 2023-10-07 PROBLEM — B35.1 ONYCHOMYCOSIS DUE TO DERMATOPHYTE: Status: RESOLVED | Noted: 2023-10-07 | Resolved: 2023-10-07

## 2023-10-07 PROBLEM — K40.90 RIGHT INGUINAL HERNIA: Status: RESOLVED | Noted: 2023-10-07 | Resolved: 2023-10-07

## 2023-10-07 PROBLEM — N40.0 BENIGN PROSTATE HYPERPLASIA: Status: RESOLVED | Noted: 2023-10-05 | Resolved: 2023-10-07

## 2023-10-07 PROBLEM — I49.9 IRREGULAR HEARTBEAT: Status: RESOLVED | Noted: 2023-10-07 | Resolved: 2023-10-07

## 2023-10-07 PROBLEM — S81.809S: Status: RESOLVED | Noted: 2023-10-07 | Resolved: 2023-10-07

## 2023-10-07 PROBLEM — I35.9 AORTIC VALVE DISORDER: Status: RESOLVED | Noted: 2023-03-21 | Resolved: 2023-10-07

## 2023-10-07 PROBLEM — L85.3 XEROSIS CUTIS: Status: RESOLVED | Noted: 2023-10-07 | Resolved: 2023-10-07

## 2023-10-07 PROBLEM — R60.9 PERIPHERAL EDEMA: Status: RESOLVED | Noted: 2023-10-07 | Resolved: 2023-10-07

## 2023-10-07 PROBLEM — D22.9 NUMEROUS SKIN MOLES: Status: RESOLVED | Noted: 2023-10-07 | Resolved: 2023-10-07

## 2023-10-07 PROBLEM — I05.9 MITRAL VALVE DISEASE: Status: RESOLVED | Noted: 2023-03-21 | Resolved: 2023-10-07

## 2023-10-07 PROBLEM — I11.0 HYPERTENSIVE HEART DISEASE WITH HEART FAILURE (MULTI): Status: RESOLVED | Noted: 2023-03-29 | Resolved: 2023-10-07

## 2023-10-07 PROBLEM — I50.43 ACUTE ON CHRONIC COMBINED SYSTOLIC AND DIASTOLIC HEART FAILURE (MULTI): Status: RESOLVED | Noted: 2023-10-06 | Resolved: 2023-10-07

## 2023-10-07 PROBLEM — M79.672 PAIN IN BOTH FEET: Status: RESOLVED | Noted: 2023-10-07 | Resolved: 2023-10-07

## 2023-10-07 PROBLEM — I50.30 UNSPECIFIED DIASTOLIC (CONGESTIVE) HEART FAILURE (MULTI): Status: RESOLVED | Noted: 2023-03-29 | Resolved: 2023-10-07

## 2023-10-07 PROBLEM — R60.0 EDEMA OF EXTREMITIES: Status: RESOLVED | Noted: 2023-10-07 | Resolved: 2023-10-07

## 2023-10-07 PROBLEM — F41.9 ANXIETY AND DEPRESSION: Status: RESOLVED | Noted: 2023-10-05 | Resolved: 2023-10-07

## 2023-10-07 LAB
ANION GAP SERPL CALC-SCNC: 13 MMOL/L (ref 10–20)
BUN SERPL-MCNC: 72 MG/DL (ref 6–23)
CALCIUM SERPL-MCNC: 8.8 MG/DL (ref 8.6–10.3)
CHLORIDE SERPL-SCNC: 101 MMOL/L (ref 98–107)
CO2 SERPL-SCNC: 27 MMOL/L (ref 21–32)
CREAT SERPL-MCNC: 1.36 MG/DL (ref 0.5–1.3)
ERYTHROCYTE [DISTWIDTH] IN BLOOD BY AUTOMATED COUNT: 21.2 % (ref 11.5–14.5)
GFR SERPL CREATININE-BSD FRML MDRD: 50 ML/MIN/1.73M*2
GLUCOSE SERPL-MCNC: 93 MG/DL (ref 74–99)
HCT VFR BLD AUTO: 37.9 % (ref 41–52)
HGB BLD-MCNC: 11.9 G/DL (ref 13.5–17.5)
MAGNESIUM SERPL-MCNC: 2.38 MG/DL (ref 1.6–2.4)
MCH RBC QN AUTO: 23.2 PG (ref 26–34)
MCHC RBC AUTO-ENTMCNC: 31.4 G/DL (ref 32–36)
MCV RBC AUTO: 74 FL (ref 80–100)
NRBC BLD-RTO: 0.4 /100 WBCS (ref 0–0)
PLATELET # BLD AUTO: 81 X10*3/UL (ref 150–450)
PMV BLD AUTO: ABNORMAL FL
POTASSIUM SERPL-SCNC: 3.8 MMOL/L (ref 3.5–5.3)
RBC # BLD AUTO: 5.13 X10*6/UL (ref 4.5–5.9)
SODIUM SERPL-SCNC: 137 MMOL/L (ref 136–145)
WBC # BLD AUTO: 5.7 X10*3/UL (ref 4.4–11.3)

## 2023-10-07 PROCEDURE — 36415 COLL VENOUS BLD VENIPUNCTURE: CPT | Performed by: INTERNAL MEDICINE

## 2023-10-07 PROCEDURE — 80048 BASIC METABOLIC PNL TOTAL CA: CPT | Performed by: INTERNAL MEDICINE

## 2023-10-07 PROCEDURE — 2500000004 HC RX 250 GENERAL PHARMACY W/ HCPCS (ALT 636 FOR OP/ED): Performed by: EMERGENCY MEDICINE

## 2023-10-07 PROCEDURE — 83735 ASSAY OF MAGNESIUM: CPT | Performed by: INTERNAL MEDICINE

## 2023-10-07 PROCEDURE — 2500000004 HC RX 250 GENERAL PHARMACY W/ HCPCS (ALT 636 FOR OP/ED): Performed by: INTERNAL MEDICINE

## 2023-10-07 PROCEDURE — 99232 SBSQ HOSP IP/OBS MODERATE 35: CPT

## 2023-10-07 PROCEDURE — 2060000001 HC INTERMEDIATE ICU ROOM DAILY

## 2023-10-07 PROCEDURE — 85027 COMPLETE CBC AUTOMATED: CPT | Performed by: INTERNAL MEDICINE

## 2023-10-07 PROCEDURE — 2500000001 HC RX 250 WO HCPCS SELF ADMINISTERED DRUGS (ALT 637 FOR MEDICARE OP): Performed by: EMERGENCY MEDICINE

## 2023-10-07 PROCEDURE — 2500000001 HC RX 250 WO HCPCS SELF ADMINISTERED DRUGS (ALT 637 FOR MEDICARE OP): Performed by: INTERNAL MEDICINE

## 2023-10-07 RX ADMIN — APIXABAN 2.5 MG: 5 TABLET, FILM COATED ORAL at 08:54

## 2023-10-07 RX ADMIN — TAMSULOSIN HYDROCHLORIDE 0.4 MG: 0.4 CAPSULE ORAL at 20:12

## 2023-10-07 RX ADMIN — TAMSULOSIN HYDROCHLORIDE 0.4 MG: 0.4 CAPSULE ORAL at 08:54

## 2023-10-07 RX ADMIN — POLYETHYLENE GLYCOL 3350 17 G: 17 POWDER, FOR SOLUTION ORAL at 08:54

## 2023-10-07 RX ADMIN — APIXABAN 2.5 MG: 5 TABLET, FILM COATED ORAL at 20:12

## 2023-10-07 SDOH — SOCIAL STABILITY: SOCIAL INSECURITY: HAS ANYONE EVER THREATENED TO HURT YOUR FAMILY OR YOUR PETS?: NO

## 2023-10-07 SDOH — SOCIAL STABILITY: SOCIAL INSECURITY: DOES ANYONE TRY TO KEEP YOU FROM HAVING/CONTACTING OTHER FRIENDS OR DOING THINGS OUTSIDE YOUR HOME?: NO

## 2023-10-07 SDOH — SOCIAL STABILITY: SOCIAL INSECURITY: ARE YOU OR HAVE YOU BEEN THREATENED OR ABUSED PHYSICALLY, EMOTIONALLY, OR SEXUALLY BY ANYONE?: NO

## 2023-10-07 SDOH — SOCIAL STABILITY: SOCIAL INSECURITY: DO YOU FEEL UNSAFE GOING BACK TO THE PLACE WHERE YOU ARE LIVING?: NO

## 2023-10-07 SDOH — SOCIAL STABILITY: SOCIAL INSECURITY: HAVE YOU HAD THOUGHTS OF HARMING ANYONE ELSE?: NO

## 2023-10-07 SDOH — SOCIAL STABILITY: SOCIAL INSECURITY: ABUSE: ADULT

## 2023-10-07 SDOH — SOCIAL STABILITY: SOCIAL INSECURITY: DO YOU FEEL ANYONE HAS EXPLOITED OR TAKEN ADVANTAGE OF YOU FINANCIALLY OR OF YOUR PERSONAL PROPERTY?: NO

## 2023-10-07 SDOH — SOCIAL STABILITY: SOCIAL INSECURITY: ARE THERE ANY APPARENT SIGNS OF INJURIES/BEHAVIORS THAT COULD BE RELATED TO ABUSE/NEGLECT?: NO

## 2023-10-07 ASSESSMENT — LIFESTYLE VARIABLES
AUDIT-C TOTAL SCORE: 0
HOW OFTEN DO YOU HAVE 6 OR MORE DRINKS ON ONE OCCASION: NEVER
AUDIT-C TOTAL SCORE: 0
HOW OFTEN DO YOU HAVE A DRINK CONTAINING ALCOHOL: NEVER
HOW MANY STANDARD DRINKS CONTAINING ALCOHOL DO YOU HAVE ON A TYPICAL DAY: PATIENT DOES NOT DRINK
HOW OFTEN DO YOU HAVE A DRINK CONTAINING ALCOHOL: NEVER
SKIP TO QUESTIONS 9-10: 1
AUDIT-C TOTAL SCORE: 0
HOW OFTEN DO YOU HAVE 6 OR MORE DRINKS ON ONE OCCASION: NEVER
AUDIT-C TOTAL SCORE: 0
HOW MANY STANDARD DRINKS CONTAINING ALCOHOL DO YOU HAVE ON A TYPICAL DAY: PATIENT DOES NOT DRINK
SKIP TO QUESTIONS 9-10: 1

## 2023-10-07 ASSESSMENT — COGNITIVE AND FUNCTIONAL STATUS - GENERAL
CLIMB 3 TO 5 STEPS WITH RAILING: A LITTLE
MOVING TO AND FROM BED TO CHAIR: A LITTLE
TOILETING: A LITTLE
DRESSING REGULAR UPPER BODY CLOTHING: A LITTLE
WALKING IN HOSPITAL ROOM: A LITTLE
CLIMB 3 TO 5 STEPS WITH RAILING: A LITTLE
MOBILITY SCORE: 18
HELP NEEDED FOR BATHING: A LITTLE
PERSONAL GROOMING: A LITTLE
MOBILITY SCORE: 20
MOVING FROM LYING ON BACK TO SITTING ON SIDE OF FLAT BED WITH BEDRAILS: A LITTLE
DRESSING REGULAR LOWER BODY CLOTHING: A LITTLE
DAILY ACTIVITIY SCORE: 20
PATIENT BASELINE BEDBOUND: NO
STANDING UP FROM CHAIR USING ARMS: A LITTLE
STANDING UP FROM CHAIR USING ARMS: A LITTLE
TOILETING: A LITTLE
HELP NEEDED FOR BATHING: A LITTLE
DRESSING REGULAR UPPER BODY CLOTHING: A LITTLE
DRESSING REGULAR LOWER BODY CLOTHING: A LITTLE
DAILY ACTIVITIY SCORE: 19
MOVING TO AND FROM BED TO CHAIR: A LITTLE
TURNING FROM BACK TO SIDE WHILE IN FLAT BAD: A LITTLE
WALKING IN HOSPITAL ROOM: A LITTLE

## 2023-10-07 ASSESSMENT — PATIENT HEALTH QUESTIONNAIRE - PHQ9
SUM OF ALL RESPONSES TO PHQ9 QUESTIONS 1 & 2: 0
1. LITTLE INTEREST OR PLEASURE IN DOING THINGS: NOT AT ALL
2. FEELING DOWN, DEPRESSED OR HOPELESS: NOT AT ALL
1. LITTLE INTEREST OR PLEASURE IN DOING THINGS: NOT AT ALL
2. FEELING DOWN, DEPRESSED OR HOPELESS: NOT AT ALL
SUM OF ALL RESPONSES TO PHQ9 QUESTIONS 1 & 2: 0

## 2023-10-07 ASSESSMENT — ACTIVITIES OF DAILY LIVING (ADL)
ADEQUATE_TO_COMPLETE_ADL: YES
JUDGMENT_ADEQUATE_SAFELY_COMPLETE_DAILY_ACTIVITIES: YES
PATIENT'S MEMORY ADEQUATE TO SAFELY COMPLETE DAILY ACTIVITIES?: YES
TOILETING: NEEDS ASSISTANCE
GROOMING: NEEDS ASSISTANCE
DRESSING YOURSELF: NEEDS ASSISTANCE
FEEDING YOURSELF: NEEDS ASSISTANCE
ASSISTIVE_DEVICE: EYEGLASSES
ADEQUATE_TO_COMPLETE_ADL: YES
HEARING - LEFT EAR: FUNCTIONAL
ASSISTIVE_DEVICE: EYEGLASSES
HEARING - RIGHT EAR: FUNCTIONAL
WALKS IN HOME: NEEDS ASSISTANCE
BATHING: NEEDS ASSISTANCE

## 2023-10-07 ASSESSMENT — PAIN SCALES - GENERAL: PAINLEVEL_OUTOF10: 0 - NO PAIN

## 2023-10-07 ASSESSMENT — PAIN - FUNCTIONAL ASSESSMENT: PAIN_FUNCTIONAL_ASSESSMENT: 0-10

## 2023-10-07 NOTE — PROGRESS NOTES
"Rajendra Chavis is a 88 y.o. male on day 1 of admission presenting with Acute on chronic combined systolic and diastolic heart failure (CMS/HCC).    SUBJECTIVE    Patient evaluated this morning in ED. Patient is a poor historian and is unsure about many aspects of his medical history. He denies all ROS except mentioning his leg wrappings which apparently weep fluid frequently and for which he follows with wound clinic    OBJECTIVE    Constitutional: Frail, awake/alert/oriented x3, no acute distress, alert and cooperative  Eyes: EOMI, clear sclera  Respiratory/Thorax: Patent airways, CTAB, normal breath sounds with good chest expansion  Cardiovascular: Irregular rhythm, Bradycardic in 40s, no murmurs, 2+ equal pulses of the extremities  Gastrointestinal: Distended, soft, non-tender, no rebound tenderness or guarding, +BS  Extremities: b/l 3+ pitting edema, no cyanosis edema, contusions or wounds, no clubbing  Lymphatic: No significant lymphadenopathy  Psychological: Appropriate mood and behavior  Skin: Warm and dry    Last Recorded Vitals  Blood pressure 113/53, pulse (!) 46, temperature 35.4 °C (95.7 °F), temperature source Temporal, resp. rate 18, height 1.829 m (6' 0.01\"), weight 90.7 kg (200 lb), SpO2 95 %.  Intake/Output last 3 Shifts:  I/O last 3 completed shifts:  In: - (0 mL/kg)   Out: 1640 (18.1 mL/kg) [Urine:1640 (0.5 mL/kg/hr)]  Weight: 90.7 kg     Scheduled Medications  apixaban, 2.5 mg, oral, BID  polyethylene glycol, 17 g, oral, Daily  tamsulosin, 0.4 mg, oral, BID       ASSESSMENT & PLAN    CHF exacerbation  Afib on Eliquis  -Patient volume up on exam with 3+ LE pitting edema  -Last Echo 5/23: EF 50-55%, mild AR, severe MR, severe TR  -CXR in ED showing moderate right sided pleural effusion  -Trop 22-->21,   PLAN:  -Lasix drip 5mg/hr  -Cardiology consulted    CKD III  -Patient at baseline, based on previous results  -Cr 1.47, 1.51, 1.36 (most recent)  PLAN:  -Cont to trend RFP and monitor " UOP    Chronic Problems:  DONA  Depression  BPH    Kawku Farooq,   PGY-1, Internal Medicine  Please SecureChat for any further questions  This is a preliminary note, please await attending attestation for final A/P

## 2023-10-07 NOTE — H&P
Hospital Medicine History & Physical    Patient Name: Rajendra Chavis   YOB: 1935    Subjective:  Rajendra Chavis is a 88 y.o.yo male who presented to the hospital on 10/5/23 with complaints of worsened sob, fatigue and abdominal/ leg edema. Apparently, patient was sent to ED  by his cardiologist for evaluation and admission to [facility] for optimization of his heart disease prior to mitral valve surgery. Patient follows with Godfrey Mack, Jessy and Ramicone, last echo in 5/2023 with EF 55%, severe MR & TR, mild AR. Patient has been accepted to [facility] but has been waiting in the ED for an available bed, therefore he is being admitted here for time being for diuresis. Patient still feels sob and states his edema is still worse than baseline. He has received some IVPs of lasix while in ED, last dose earlier this evening. He denies any CP, palpitations, cough, N/V, abd pain, or dysuria. Has some chronic LE ulcers that he follows for at wound clinic. While in ED patient has been intermittently bradycardic, labs Cr near baseline, , trop 22. Patient admitted to stepdown for further diuresis and care.     A 10 point ROS was completed and is negative expect as stated in HPI.     Past Medical History:  afib on Eliquis  chronic diastolic CHF (EF 50-55% 5/23)  Mild AR, severe MR, severe TR  CKD III  DONA, depression  BPH     Past Surgical History:  R inguinal hernia repair    Social History: Smoking - former smoker, quit 15 years ago, Alcohol - none, Recreational Drugs -  none  Family History: HTN, HLD, DM    Objective:    /73   Pulse 61   Temp 36.3 °C (97.3 °F) (Temporal)   Resp 16   Ht 1.829 m (6')   Wt 90.7 kg (200 lb)   SpO2 97%   BMI 27.12 kg/m²     Physical Exam:    GENERAL: A&Ox3, frail appearing, no acute distress  HEENT: normocephalic, atraumatic, EOMI, clear sclera, MMM  CARDIOVASCULAR: intermittently bradycardic, irregular rhythm.   RESPIRATORY: Diminished b/l. No wheezes rales, rhonchi. No  distress  ABDOMEN: Soft, non-tender. No rebound or guarding. Distended  EXTREMITIES: 3+ pitting peripheral edema  NEUROLOGICAL: A&O X 3. No focal deficits.   SKIN: Warm and dry  PSYCH: appropriate mood and affect      Assessment/Plan:    Decompensated HFpEF (EF 50-55% 5/23)  Severe MR, severe TR, mild AR  Elevated trop, non-MI    A fib on Eliquis  CKD III  DONA, depression  BPH     Admit to stepdown. Has been getting intermittent IVP lasix while in ED, pt on torsemide 40mg BID at home, given degree of fluid overload and pt being off mattie diuretics, start lasix ggt at rate of 5, strict I&Os, daily weights, monitor electrolytes & renal function closely, low salt/ fluid restriction diet,consult cards  [place] [facility] [place]  Continue home low dose Eliquis & flomax     DVT Prophylaxis: on Eliquis  Code Status: Full Code   Disposition: stepdown      Makayla Emery DO  [facility]

## 2023-10-07 NOTE — CARE PLAN
The patient's goals for the shift include  remain free from injury     The clinical goals for the shift include Pt will maintain pulse ox of 93% and above on room air      Problem: Pain - Adult  Goal: Verbalizes/displays adequate comfort level or baseline comfort level  Outcome: Progressing     Problem: Safety - Adult  Goal: Free from fall injury  Outcome: Progressing     Problem: Skin  Goal: Decreased wound size/increased tissue granulation at next dressing change  Outcome: Progressing  Goal: Participates in plan/prevention/treatment measures  Outcome: Progressing  Goal: Prevent/manage excess moisture  Outcome: Progressing  Goal: Prevent/minimize sheer/friction injuries  Outcome: Progressing  Goal: Promote/optimize nutrition  Outcome: Progressing  Goal: Promote skin healing  Outcome: Progressing     Problem: Fall/Injury  Goal: Not fall by end of shift  Outcome: Progressing  Goal: Be free from injury by end of the shift  Outcome: Progressing  Goal: Verbalize understanding of personal risk factors for fall in the hospital  Outcome: Progressing  Goal: Verbalize understanding of risk factor reduction measures to prevent injury from fall in the home  Outcome: Progressing  Goal: Use assistive devices by end of the shift  Outcome: Progressing  Goal: Pace activities to prevent fatigue by end of the shift  Outcome: Progressing

## 2023-10-07 NOTE — ED NOTES
This RN erroneously validated patient's VS from previous shifts and every minute of current shift. Please disregard.      Sarah Loera RN  10/07/23 [id number]

## 2023-10-08 ENCOUNTER — APPOINTMENT (OUTPATIENT)
Dept: RADIOLOGY | Facility: HOSPITAL | Age: 88
DRG: 291 | End: 2023-10-08
Payer: MEDICARE

## 2023-10-08 ENCOUNTER — HOSPITAL ENCOUNTER (INPATIENT)
Facility: HOSPITAL | Age: 88
LOS: 10 days | DRG: 291 | End: 2023-10-18
Attending: STUDENT IN AN ORGANIZED HEALTH CARE EDUCATION/TRAINING PROGRAM
Payer: MEDICARE

## 2023-10-08 VITALS
HEIGHT: 72 IN | TEMPERATURE: 96.8 F | BODY MASS INDEX: 27.09 KG/M2 | WEIGHT: 200 LBS | HEART RATE: 52 BPM | RESPIRATION RATE: 18 BRPM | OXYGEN SATURATION: 96 % | SYSTOLIC BLOOD PRESSURE: 116 MMHG | DIASTOLIC BLOOD PRESSURE: 71 MMHG

## 2023-10-08 VITALS
SYSTOLIC BLOOD PRESSURE: 121 MMHG | TEMPERATURE: 96.4 F | DIASTOLIC BLOOD PRESSURE: 57 MMHG | HEART RATE: 47 BPM | OXYGEN SATURATION: 96 %

## 2023-10-08 DIAGNOSIS — I34.0 NONRHEUMATIC MITRAL VALVE REGURGITATION: ICD-10-CM

## 2023-10-08 DIAGNOSIS — M79.89 SWELLING OF RIGHT UPPER EXTREMITY: ICD-10-CM

## 2023-10-08 DIAGNOSIS — I50.31 ACUTE HEART FAILURE WITH PRESERVED EJECTION FRACTION (MULTI): Primary | ICD-10-CM

## 2023-10-08 PROBLEM — L03.90 CELLULITIS: Status: RESOLVED | Noted: 2023-10-05 | Resolved: 2023-10-08

## 2023-10-08 LAB
ALBUMIN SERPL BCP-MCNC: 3.1 G/DL (ref 3.4–5)
ANION GAP SERPL CALC-SCNC: 13 MMOL/L (ref 10–20)
ANION GAP SERPL CALC-SCNC: 16 MMOL/L (ref 10–20)
BUN SERPL-MCNC: 65 MG/DL (ref 6–23)
BUN SERPL-MCNC: 71 MG/DL (ref 6–23)
CALCIUM SERPL-MCNC: 8.9 MG/DL (ref 8.6–10.3)
CALCIUM SERPL-MCNC: 9 MG/DL (ref 8.6–10.6)
CHLORIDE SERPL-SCNC: 100 MMOL/L (ref 98–107)
CHLORIDE SERPL-SCNC: 101 MMOL/L (ref 98–107)
CO2 SERPL-SCNC: 26 MMOL/L (ref 21–32)
CO2 SERPL-SCNC: 28 MMOL/L (ref 21–32)
CREAT SERPL-MCNC: 1.56 MG/DL (ref 0.5–1.3)
CREAT SERPL-MCNC: 1.61 MG/DL (ref 0.5–1.3)
ERYTHROCYTE [DISTWIDTH] IN BLOOD BY AUTOMATED COUNT: 21.2 % (ref 11.5–14.5)
ERYTHROCYTE [DISTWIDTH] IN BLOOD BY AUTOMATED COUNT: 21.5 % (ref 11.5–14.5)
GFR SERPL CREATININE-BSD FRML MDRD: 41 ML/MIN/1.73M*2
GFR SERPL CREATININE-BSD FRML MDRD: 42 ML/MIN/1.73M*2
GLUCOSE SERPL-MCNC: 191 MG/DL (ref 74–99)
GLUCOSE SERPL-MCNC: 97 MG/DL (ref 74–99)
HCT VFR BLD AUTO: 37 % (ref 41–52)
HCT VFR BLD AUTO: 37.7 % (ref 41–52)
HGB BLD-MCNC: 11.8 G/DL (ref 13.5–17.5)
HGB BLD-MCNC: 11.8 G/DL (ref 13.5–17.5)
MCH RBC QN AUTO: 23.1 PG (ref 26–34)
MCH RBC QN AUTO: 23.9 PG (ref 26–34)
MCHC RBC AUTO-ENTMCNC: 31.3 G/DL (ref 32–36)
MCHC RBC AUTO-ENTMCNC: 31.9 G/DL (ref 32–36)
MCV RBC AUTO: 74 FL (ref 80–100)
MCV RBC AUTO: 75 FL (ref 80–100)
NRBC BLD-RTO: 0 /100 WBCS (ref 0–0)
NRBC BLD-RTO: 0 /100 WBCS (ref 0–0)
PHOSPHATE SERPL-MCNC: 4 MG/DL (ref 2.5–4.9)
PLATELET # BLD AUTO: 82 X10*3/UL (ref 150–450)
PLATELET # BLD AUTO: 82 X10*3/UL (ref 150–450)
PMV BLD AUTO: ABNORMAL FL
PMV BLD AUTO: ABNORMAL FL
POTASSIUM SERPL-SCNC: 3.8 MMOL/L (ref 3.5–5.3)
POTASSIUM SERPL-SCNC: 4 MMOL/L (ref 3.5–5.3)
RBC # BLD AUTO: 4.93 X10*6/UL (ref 4.5–5.9)
RBC # BLD AUTO: 5.11 X10*6/UL (ref 4.5–5.9)
SODIUM SERPL-SCNC: 137 MMOL/L (ref 136–145)
SODIUM SERPL-SCNC: 139 MMOL/L (ref 136–145)
WBC # BLD AUTO: 5.1 X10*3/UL (ref 4.4–11.3)
WBC # BLD AUTO: 6 X10*3/UL (ref 4.4–11.3)

## 2023-10-08 PROCEDURE — 36415 COLL VENOUS BLD VENIPUNCTURE: CPT

## 2023-10-08 PROCEDURE — 80048 BASIC METABOLIC PNL TOTAL CA: CPT

## 2023-10-08 PROCEDURE — 1200000002 HC GENERAL ROOM WITH TELEMETRY DAILY

## 2023-10-08 PROCEDURE — 71046 X-RAY EXAM CHEST 2 VIEWS: CPT | Performed by: RADIOLOGY

## 2023-10-08 PROCEDURE — 85027 COMPLETE CBC AUTOMATED: CPT

## 2023-10-08 PROCEDURE — 2500000004 HC RX 250 GENERAL PHARMACY W/ HCPCS (ALT 636 FOR OP/ED)

## 2023-10-08 PROCEDURE — 99238 HOSP IP/OBS DSCHRG MGMT 30/<: CPT | Performed by: INTERNAL MEDICINE

## 2023-10-08 PROCEDURE — 99223 1ST HOSP IP/OBS HIGH 75: CPT | Performed by: INTERNAL MEDICINE

## 2023-10-08 PROCEDURE — 71046 X-RAY EXAM CHEST 2 VIEWS: CPT

## 2023-10-08 PROCEDURE — 2500000001 HC RX 250 WO HCPCS SELF ADMINISTERED DRUGS (ALT 637 FOR MEDICARE OP)

## 2023-10-08 RX ORDER — TORSEMIDE 20 MG/1
40 TABLET ORAL DAILY
COMMUNITY

## 2023-10-08 RX ORDER — POTASSIUM CHLORIDE 750 MG/1
10 TABLET, FILM COATED, EXTENDED RELEASE ORAL DAILY
COMMUNITY

## 2023-10-08 RX ORDER — FUROSEMIDE 10 MG/ML
80 INJECTION INTRAMUSCULAR; INTRAVENOUS ONCE
Status: COMPLETED | OUTPATIENT
Start: 2023-10-08 | End: 2023-10-08

## 2023-10-08 RX ORDER — TAMSULOSIN HYDROCHLORIDE 0.4 MG/1
0.4 CAPSULE ORAL DAILY
Status: DISCONTINUED | OUTPATIENT
Start: 2023-10-08 | End: 2023-10-08

## 2023-10-08 RX ORDER — POLYETHYLENE GLYCOL 3350 17 G/17G
17 POWDER, FOR SOLUTION ORAL DAILY
Status: DISCONTINUED | OUTPATIENT
Start: 2023-10-08 | End: 2023-10-16

## 2023-10-08 RX ORDER — TAMSULOSIN HYDROCHLORIDE 0.4 MG/1
0.4 CAPSULE ORAL 2 TIMES DAILY
Status: DISCONTINUED | OUTPATIENT
Start: 2023-10-08 | End: 2023-10-16

## 2023-10-08 RX ADMIN — TAMSULOSIN HYDROCHLORIDE 0.4 MG: 0.4 CAPSULE ORAL at 12:33

## 2023-10-08 RX ADMIN — FUROSEMIDE 10 MG/HR: 10 INJECTION, SOLUTION INTRAMUSCULAR; INTRAVENOUS at 13:42

## 2023-10-08 RX ADMIN — FUROSEMIDE 80 MG: 10 INJECTION, SOLUTION INTRAVENOUS at 12:33

## 2023-10-08 RX ADMIN — TAMSULOSIN HYDROCHLORIDE 0.4 MG: 0.4 CAPSULE ORAL at 21:32

## 2023-10-08 RX ADMIN — APIXABAN 2.5 MG: 2.5 TABLET, FILM COATED ORAL at 21:32

## 2023-10-08 RX ADMIN — APIXABAN 2.5 MG: 2.5 TABLET, FILM COATED ORAL at 12:33

## 2023-10-08 SDOH — SOCIAL STABILITY: SOCIAL INSECURITY: HAS ANYONE EVER THREATENED TO HURT YOUR FAMILY OR YOUR PETS?: NO

## 2023-10-08 SDOH — SOCIAL STABILITY: SOCIAL INSECURITY: ARE YOU OR HAVE YOU BEEN THREATENED OR ABUSED PHYSICALLY, EMOTIONALLY, OR SEXUALLY BY ANYONE?: NO

## 2023-10-08 SDOH — SOCIAL STABILITY: SOCIAL INSECURITY: ARE THERE ANY APPARENT SIGNS OF INJURIES/BEHAVIORS THAT COULD BE RELATED TO ABUSE/NEGLECT?: NO

## 2023-10-08 SDOH — SOCIAL STABILITY: SOCIAL INSECURITY: DOES ANYONE TRY TO KEEP YOU FROM HAVING/CONTACTING OTHER FRIENDS OR DOING THINGS OUTSIDE YOUR HOME?: NO

## 2023-10-08 SDOH — SOCIAL STABILITY: SOCIAL INSECURITY: ABUSE: ADULT

## 2023-10-08 SDOH — SOCIAL STABILITY: SOCIAL INSECURITY: HAVE YOU HAD THOUGHTS OF HARMING ANYONE ELSE?: NO

## 2023-10-08 SDOH — SOCIAL STABILITY: SOCIAL INSECURITY: DO YOU FEEL ANYONE HAS EXPLOITED OR TAKEN ADVANTAGE OF YOU FINANCIALLY OR OF YOUR PERSONAL PROPERTY?: NO

## 2023-10-08 SDOH — SOCIAL STABILITY: SOCIAL INSECURITY: DO YOU FEEL UNSAFE GOING BACK TO THE PLACE WHERE YOU ARE LIVING?: NO

## 2023-10-08 ASSESSMENT — COGNITIVE AND FUNCTIONAL STATUS - GENERAL
MOVING FROM LYING ON BACK TO SITTING ON SIDE OF FLAT BED WITH BEDRAILS: A LITTLE
MOVING TO AND FROM BED TO CHAIR: A LITTLE
TOILETING: A LITTLE
MOBILITY SCORE: 18
HELP NEEDED FOR BATHING: A LITTLE
DAILY ACTIVITIY SCORE: 20
CLIMB 3 TO 5 STEPS WITH RAILING: A LITTLE
PATIENT BASELINE BEDBOUND: NO
STANDING UP FROM CHAIR USING ARMS: A LITTLE
DRESSING REGULAR LOWER BODY CLOTHING: A LITTLE
TURNING FROM BACK TO SIDE WHILE IN FLAT BAD: A LITTLE
DRESSING REGULAR UPPER BODY CLOTHING: A LITTLE
WALKING IN HOSPITAL ROOM: A LITTLE

## 2023-10-08 ASSESSMENT — ACTIVITIES OF DAILY LIVING (ADL)
JUDGMENT_ADEQUATE_SAFELY_COMPLETE_DAILY_ACTIVITIES: YES
HEARING - RIGHT EAR: FUNCTIONAL
PATIENT'S MEMORY ADEQUATE TO SAFELY COMPLETE DAILY ACTIVITIES?: YES
DRESSING YOURSELF: INDEPENDENT
HEARING - LEFT EAR: FUNCTIONAL
BATHING: INDEPENDENT
GROOMING: INDEPENDENT
WALKS IN HOME: INDEPENDENT
ASSISTIVE_DEVICE: EYEGLASSES
ADEQUATE_TO_COMPLETE_ADL: YES
TOILETING: INDEPENDENT
FEEDING YOURSELF: INDEPENDENT

## 2023-10-08 ASSESSMENT — PATIENT HEALTH QUESTIONNAIRE - PHQ9
2. FEELING DOWN, DEPRESSED OR HOPELESS: NOT AT ALL
SUM OF ALL RESPONSES TO PHQ9 QUESTIONS 1 & 2: 0
1. LITTLE INTEREST OR PLEASURE IN DOING THINGS: NOT AT ALL

## 2023-10-08 ASSESSMENT — LIFESTYLE VARIABLES
AUDIT-C TOTAL SCORE: 2
SKIP TO QUESTIONS 9-10: 1
HOW OFTEN DO YOU HAVE A DRINK CONTAINING ALCOHOL: 2-4 TIMES A MONTH
HOW MANY STANDARD DRINKS CONTAINING ALCOHOL DO YOU HAVE ON A TYPICAL DAY: 1 OR 2
AUDIT-C TOTAL SCORE: 2
HOW OFTEN DO YOU HAVE 6 OR MORE DRINKS ON ONE OCCASION: NEVER

## 2023-10-08 ASSESSMENT — ENCOUNTER SYMPTOMS
SHORTNESS OF BREATH: 1
POOR WOUND HEALING: 1
BLOATING: 1
WEIGHT GAIN: 1

## 2023-10-08 ASSESSMENT — PAIN - FUNCTIONAL ASSESSMENT: PAIN_FUNCTIONAL_ASSESSMENT: 0-10

## 2023-10-08 ASSESSMENT — PAIN SCALES - GENERAL: PAINLEVEL_OUTOF10: 0 - NO PAIN

## 2023-10-08 NOTE — DISCHARGE SUMMARY
Discharge Diagnosis  Acute on chronic combined systolic and diastolic heart failure (CMS/[facility])    Issues Requiring Follow-Up  CHF, severe MR & TR    Discharge Meds     Your medication list        CONTINUE taking these medications        Instructions Last Dose Given Next Dose Due   Eliquis 2.5 mg tablet  Generic drug: apixaban           PreserVision AREDS-2 250-90-40-1 mg capsule  Generic drug: vit C,X-Zy-vkkjh-lutein-zeaxan           tamsulosin 0.4 mg 24 hr capsule  Commonly known as: Flomax                  STOP taking these medications      potassium chloride CR 10 mEq ER tablet  Commonly known as: Klor-Con        torsemide 20 mg tablet  Commonly known as: Demadex                 Test Results Pending At Discharge  Pending Labs       No current pending labs.            Hospital Course  Rajendra Chavis is an 89 yo male with diastolic CHF (EF 50-55%), severe MR & TR, CKD 3, a fib on Eliquis, who presented to the hospital on 10/5/23 with complaints of worsened sob, fatigue and abdominal/ leg edema. Apparently, patient was sent to ED  by his cardiologist for evaluation and admission to [facility] for optimization of his heart disease prior to mitral valve surgery. Patient follows with Godfrey Mack, Jessy and Ramicone, last echo in 5/2023 with EF 55%, severe MR & TR, mild AR. Patient was accepted to [facility] but admitted to [place] while waiting for a bed. Patient started on lasix ggt at rate of 5 on admission. Bed is now available and patient is being discharged to [facility] for further tx of his decompensated CHF and valvular disease.    Pertinent Physical Exam At Time of Discharge  Physical Exam  GENERAL: A&Ox3, frail appearing, no acute distress  HEENT: normocephalic, atraumatic, EOMI, clear sclera, MMM  CARDIOVASCULAR: intermittently bradycardic, irregular rhythm.   RESPIRATORY: Diminished b/l. No wheezes rales, rhonchi. No distress  ABDOMEN: Soft, non-tender. No rebound or guarding. Distended  EXTREMITIES: 3+ pitting peripheral  edema  NEUROLOGICAL: A&O X 3. No focal deficits.   SKIN: Warm and dry  PSYCH: appropriate mood and affect    Outpatient Follow-Up  Future Appointments   Date Time Provider Department [place]   11/17/2023  8:00 AM  RACHEL [id number] CARD1 [id number] [facility]   12/11/2023  9:20 AM Ramon Mack MD [id number] [place]   1/23/2024  1:15 PM Vinicius Jiménez MD [id number] [place]   10/18/2024  8:00 AM  RACHEL [id number] CARD1 [id number] [facility]         Makayla Emery DO

## 2023-10-08 NOTE — CARE PLAN
Problem: Pain  Goal: My pain/discomfort is manageable  Outcome: Progressing     Problem: Safety  Goal: Patient will be injury free during hospitalization  Outcome: Progressing  Goal: I will remain free of falls  Outcome: Progressing     Problem: Daily Care  Goal: Daily care needs are met  Outcome: Progressing     Problem: Psychosocial Needs  Goal: Demonstrates ability to cope with hospitalization/illness  Outcome: Progressing  Goal: Collaborate with me, my family, and caregiver to identify my specific goals  Outcome: Progressing  Flowsheets (Taken 10/8/2023 1029)  Cultural Requests During Hospitalization: none  Spiritual Requests During Hospitalization: none     Problem: Discharge Barriers  Goal: My discharge needs are met  Outcome: Progressing     Problem: Fall/Injury  Goal: Not fall by end of shift  Outcome: Progressing  Goal: Be free from injury by end of the shift  Outcome: Progressing  Goal: Verbalize understanding of personal risk factors for fall in the hospital  Outcome: Progressing     Problem: Pain  Goal: Takes deep breaths with improved pain control throughout the shift  Outcome: Progressing  Goal: Turns in bed with improved pain control throughout the shift  Outcome: Progressing  Goal: Walks with improved pain control throughout the shift  Outcome: Progressing  Goal: Performs ADL's with improved pain control throughout shift  Outcome: Progressing  Goal: Participates in PT with improved pain control throughout the shift  Outcome: Progressing     Problem: Skin  Goal: Participates in plan/prevention/treatment measures  Outcome: Progressing  Goal: Prevent/manage excess moisture  Outcome: Progressing  Goal: Prevent/minimize sheer/friction injuries  Outcome: Progressing  Goal: Promote/optimize nutrition  Outcome: Progressing   The patient's goals for the shift include      The clinical goals for the shift include Pt will maintain SpO2 above 93% while on RA.    Pt remained safe and free from injury through  shift. Pt admitted to floor, placed on telemetry and oriented to room equipment. Pt to have MVR, not yet scheduled.

## 2023-10-08 NOTE — H&P
History Of Present Illness:    Rajendra Chavis is a 88 y.o. male with PMHx of diastolic CHF (EF 50-55%), severe MR & TR, CKD 3, Afib on Eliquis, and R pretibial venous ulcer who presented to [facility] on 10/5/23 with complaints of worsened SOB, fatigue and abdominal/ leg edema. Patient was instructed to report to ED by his cardiologist for evaluation and admission to [facility] for optimization of his heart disease prior to DENIS scheduled on 10/19. Patient follows with Godfrey Mack, Jessy and Ramicone. Last echo in 5/2023 with EF 55%, severe MR & TR, mild AR. While awaiting a bed at [facility], patient was started on lasix ggt at rate of 5 on admission at [facility]. Transfer to [facility] for further optimization of CHF prior to scheduled DENIS.     On admission to [facility], patient was a poor historian. I received further history from his wife, Sunita. Patient reports continued SOB with exertion, denying SOB at rest. He notes that his LE edema has been persistent. He denies any chest pain, palpitation, abdominal pain, fever, chills, or malaise. He voiced frustration about his multiple hospitalizations and being passed around team to team. He voiced some understanding about upcoming MitraClip; however further education was given at bedside about procedure.     His wife reports that Rajendra is very frustrated about his current state of health and is anxiously awaiting intervention. She notes that she has noticed gradual and persistent worsening LE swelling for quite some time now, not noticing improvement with his daily torsemide. She reports that she has kept to the strict 2g sodium and 2L fluid restriction at home to no avail. When asked about his typical baseline, she notes he is independent and was just driving and going to the grocery store a few days ago without limitation. When asked about his baseline and the observable word slurring / trouble word finding on exam, she attributes to his current frustration. Dr. Mack confirmed that his  is patient's baseline.   Patient has a RN that comes to the home every other day, wife confirms his HR is historically low in 40-50s with controlled SBP of 100-110s.     Review of Systems   Constitutional: Positive for weight gain.   Cardiovascular:  Positive for leg swelling.   Respiratory:  Positive for shortness of breath (at rest and with exertion).    Skin:  Positive for poor wound healing.   Gastrointestinal:  Positive for bloating.   All other systems reviewed and are negative.        Last Recorded Vitals:  Vitals:    10/08/23 0940 10/08/23 1015 10/08/23 1115   BP: 102/58  117/66   BP Location: Right arm  Right arm   Pulse: 53  54   Resp: 22  24   Temp: 36.1 °C (97 °F)  35.6 °C (96 °F)   TempSrc: Temporal  Temporal   SpO2: 96%  97%   Weight: 91.3 kg (201 lb 4.5 oz)     Height:  1.829 m (6')        Last Labs:  CBC - 10/8/2023:  6:13 AM  6.0 11.8 82    37.7      CMP - 10/8/2023:  6:13 AM  8.9 6.6 24 --- 1.3   3.7 3.0 15 102      PTT - 10/5/2023:  4:51 PM  2.3   26.1 43     Troponin I, High Sensitivity   Date/Time Value Ref Range Status   10/06/2023 10:43 PM 21 (H) 0 - 20 ng/L Final   10/05/2023 04:51 PM 22 (H) 0 - 20 ng/L Final     Troponin I   Date/Time Value Ref Range Status   03/21/2023 05:30 AM 25 (H) 0 - 20 ng/L Final     Comment:     .  Less than 99th percentile of normal range cutoff-  Female and children under 18 years old <14 ng/L; Male <21 ng/L: Negative  Repeat testing should be performed if clinically indicated.   .  Female and children under 18 years old 14-50 ng/L; Male 21-50 ng/L:  Consistent with possible cardiac damage and possible increased clinical   risk. Serial measurements may help to assess extent of myocardial damage.   .  >50 ng/L: Consistent with cardiac damage, increased clinical risk and  myocardial infarction. Serial measurements may help assess extent of   myocardial damage.   .   NOTE: Children less than 1 year old may have higher baseline troponin   levels and results should be  interpreted in conjunction with the overall   clinical context.   .  NOTE: Troponin I testing is performed using a different   testing methodology at [facility] than at other   system [facility] [place]. Direct result comparisons should only   be made within the same method.     03/20/2023 02:05 PM 23 (H) 0 - 20 ng/L Final     Comment:     .  Less than 99th percentile of normal range cutoff-  Female and children under 18 years old <14 ng/L; Male <21 ng/L: Negative  Repeat testing should be performed if clinically indicated.   .  Female and children under 18 years old 14-50 ng/L; Male 21-50 ng/L:  Consistent with possible cardiac damage and possible increased clinical   risk. Serial measurements may help to assess extent of myocardial damage.   .  >50 ng/L: Consistent with cardiac damage, increased clinical risk and  myocardial infarction. Serial measurements may help assess extent of   myocardial damage.   .   NOTE: Children less than 1 year old may have higher baseline troponin   levels and results should be interpreted in conjunction with the overall   clinical context.   .  NOTE: Troponin I testing is performed using a different   testing methodology at [facility] than at other   [facility]. Direct result comparisons should only   be made within the same method.       BNP   Date/Time Value Ref Range Status   10/05/2023 04:51  (H) 0 - 99 pg/mL Final   06/20/2023 09:55  (H) 0 - 99 pg/mL Final     Comment:     .  <100 pg/mL - Heart failure unlikely  100-299 pg/mL - Intermediate probability of acute heart  .               failure exacerbation. Correlate with clinical  .               context and patient history.    >=300 pg/mL - Heart Failure likely. Correlate with clinical  .               context and patient history.   Biotin interference may cause falsely decreased results.   Patients taking a Biotin dose of up to 5 mg/day should   refrain from taking Biotin for 24 hours before  sample   collection. Providers may contact their local laboratory   for further information.     04/26/2023 09:14  (H) 0 - 99 pg/mL Final     Comment:     .  <100 pg/mL - Heart failure unlikely  100-299 pg/mL - Intermediate probability of acute heart  .               failure exacerbation. Correlate with clinical  .               context and patient history.    >=300 pg/mL - Heart Failure likely. Correlate with clinical  .               context and patient history.   Biotin interference may cause falsely decreased results.   Patients taking a Biotin dose of up to 5 mg/day should   refrain from taking Biotin for 24 hours before sample   collection. Providers may contact their local laboratory   for further information.       Hemoglobin A1C   Date/Time Value Ref Range Status   03/21/2023 05:30 AM 6.6 (A) % Final     Comment:          Diagnosis of Diabetes-Adults   Non-Diabetic: < or = 5.6%   Increased risk for developing diabetes: 5.7-6.4%   Diagnostic of diabetes: > or = 6.5%  .       Monitoring of Diabetes                Age (y)     Therapeutic Goal (%)   Adults:          >18           <7.0   Pediatrics:    13-18           <7.5                   7-12           <8.0                   0- 6            7.5-8.5   American Diabetes Association. Diabetes Care 33(S1), Jan 2010.       VLDL   Date/Time Value Ref Range Status   03/21/2023 05:30 AM 8 0 - 40 mg/dL Final      Last I/O:  No intake/output data recorded.    Past Cardiology Tests (Last 3 Years):  EKG:  Pending admit ECG    Echo: [facility] 5/8/2023  1. Left ventricular systolic function is normal with a 55% estimated ejection fraction.  2. There is mildly reduced right ventricular systolic function.  3. The left atrium is moderately dilated.  4. The right atrium is severely dilated.  5. Severe mitral valve regurgitation.  6. Severe tricuspid regurgitation visualized.  7. Mild aortic valve regurgitation.  8. No left atrial mass.  9. No left atrial thrombus.  10. A  bubble study using agitated saline was performed. Bubble study is positive. A small PFO (= 10 bubbles) was demonstrated.  11. There is plaque visualized in the descending aorta.  12. There is plaque visualized in the transverse aorta.    Cath: /[facility] 5/8/2023  1. 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  2. Elevated filling/pulmonary pressures.  3. Preserved CO/CI by assumed Francisco method.      Past Medical History:  He has a past medical history of Acute on chronic combined systolic and diastolic heart failure (CMS/HCC) (10/06/2023), Anxiety and depression (10/05/2023), Aortic valve disorder (03/21/2023), Atrial fibrillation (CMS/HCC) (03/29/2023), Benign prostate hyperplasia (10/05/2023), CHF (congestive heart failure) (CMS/[facility]), Edema of extremities (10/07/2023), Hypertensive heart disease with heart failure (CMS/[facility]) (03/29/2023), Incarcerated right inguinal hernia (10/07/2023), Irregular heartbeat (10/07/2023), Mitral valve disease (03/21/2023), Numerous skin moles (10/07/2023), Onychomycosis due to dermatophyte (10/07/2023), Pain in both feet (10/07/2023), Peripheral edema (10/07/2023), Personal history of other medical treatment, Right inguinal hernia (10/07/2023), Unspecified diastolic (congestive) heart failure (CMS/HCC) (03/29/2023), Venous congestion (10/07/2023), Wounds, multiple open, lower extremity, unspecified laterality, sequela (10/07/2023), and Xerosis cutis (10/07/2023).    Past Surgical History:  He has a past surgical history that includes Other surgical history (09/16/2021).      Social History:  He reports that he has quit smoking. His smoking use included cigarettes. He has never used smokeless tobacco. No history on file for alcohol use and drug use.    Family History:  No family history on file.     Allergies:  Patient has no known allergies.    Inpatient Medications:  Scheduled medications   Medication Dose Route Frequency    apixaban  2.5 mg oral BID    furosemide  80 mg  intravenous Once    polyethylene glycol  17 g oral Daily    tamsulosin  0.4 mg oral BID    vit C,M-Fr-thagt-lutein-zeaxan  1 capsule oral Daily     PRN medications   Medication     Continuous Medications   Medication Dose Last Rate    furosemide  10 mg/hr       Outpatient Medications:  Current Outpatient Medications   Medication Instructions    Eliquis 2.5 mg tablet 1 tablet, oral, 2 times daily    tamsulosin (FLOMAX) 0.4 mg, oral, 2 times daily    vit C,J-Sd-qcfee-lutein-zeaxan (PreserVision AREDS-2) 250-90-40-1 mg capsule 1 capsule, oral, Daily       Physical Exam:  Physical Exam  Vitals reviewed.   HENT:      Mouth/Throat:      Mouth: Mucous membranes are moist.   Cardiovascular:      Rate and Rhythm: Bradycardia present. Rhythm irregular. Occasional Extrasystoles are present.     Pulses: Normal pulses.      Heart sounds: Murmur heard.      Systolic murmur is present with a grade of 4/6.   Pulmonary:      Breath sounds: Examination of the right-lower field reveals decreased breath sounds. Decreased breath sounds (anterior ausculation) present.   Abdominal:      General: There is distension.      Tenderness: There is no abdominal tenderness.      Comments: Edematous abdomen    Musculoskeletal:      Right lower leg: 3+ Pitting Edema present.      Left lower leg: 3+ Pitting Edema present.      Comments: Pitting edema to abdomen    Skin:     General: Skin is warm and dry.      Comments: Dressing over anterior R pretibial venous ulcer. Venous skin changes present on bilateral LE   Neurological:      Mental Status: He is alert and oriented to person, place, and time.      Comments: Occasional word slurring and difficulty word finding noted on exam (Dr. Mack confirmed this is patient's baseline)           Assessment/Plan   Rajendra Chavis is a 88 y.o. male with PMHx of diastolic CHF (EF 50-55%), severe MR & TR, CKD 3, Afib on Eliquis, and R pretibial venous ulcer who presented to [facility] on 10/5/23 with  complaints of worsened SOB, fatigue and abdominal/ leg edema. Transfer to [facility] for further optimization of CHF prior to scheduled DENIS on 10/19 with Dr. Orellana.     Acute on chronic diastolic heart failure  Severe MR & TR  - follows with Dr. Mack  - [facility] 5/8/23: EF 55%, LA moderately dilated, RA severely dilated, severe MR, severe TR, small PFO (10 bubbles), plaque visualized in descending & transverse aorta  - L/[facility] 5/08/2023: No angiographically significant CAD in right-dominant circulation. Elevated filling/pulmonary pressures. Preserved CO/CI by assumed Francisco method.  - CXR in [place] ED 10/5: small to moderate right pleural effusion  - repeat CXR pending  - 10/19 scheduled for MitraClip +/- tricuspid clip with Reyna and Pepe (hold Eliquis 3 days prior)  - Structural heart consulted, to see tomorrow  - admit BNP on 10/5: 508 (previously 570 in 6/2023)  - HS trop: 22 --> 21  - admit wt at [place]: 90.7 kg  - daily wt: 91.3kg   - significantly hypervolemic on exam  - started on laxix gtt 5mg/hr--net negative ~2L  - increased Lasix gtt at 10mg/hr + IV Lasix 80mg x1  - consider initiation of spironolactone  - consider initiation of SLGT2i following DENIS  - holding home torsemide 20mg  - Daily standing weights, strict I&O's, 2g sodium diet, 2L fluid restriction    Atrial fibrillation with slow ventricular response   - anticoagulated on Eliquis 2.5 mg BID  - per Dr. Mack: rate is reasonably well controlled, do not think restoration of sinus rhythm will have any benefit. Given his fall risk, and nosebleeds we can consider him for Watchman procedure. We will defer this until his valves are addressed.  - baseline HR 40-60s, asymptomatic   - admit ECG pending  - c/w Eliquis 2.5mg BID    Right pretibial venous ulcer  PVD  - wound care followed patient as an outpatient and while inpatient at [place]  - had been every other day dressing changes  - wound care consulted, appreciate recs  -  to assess status of wound and  ability to proceed with MitraClip as scheduled    Delayed speech  - patient exhibiting word slurring/trouble word finding on exam; although A&Ox3  - wife attributed to patient's current frustration  - Dr. Mack confirmed this is his observable baseline from previous office visits  - Dr. Mack recommending CT head if he has not received one previously  - OT consulted for MOCA score, pending evaluation     CKD III, stable   - baseline: 1.3-1.6  - Admit Cr: 1.47  - Cr trend: 1.61 (1.36, 1.51)  - diuresis as above    BPH  - c/w home tamsulosin 0.4mg BID      DVT ppx: Eliquis 2.5mg BID  Dispo: pending diuresis, wound care assessment, structural heart evaluation pending MitraClip scheduled for 10/19, PT/OT to assess, MOCA  Code Status:  Full Code    I spent 60 minutes in the professional and overall care of this patient.    To be staffed with HVI attending in AM    Neil Hansen PA-C

## 2023-10-08 NOTE — ED NOTES
Pharmacy Medication History Review    Rajendra Chavis is a 88 y.o. male admitted for Acute heart failure with preserved ejection fraction (CMS/[facility]). Pharmacy reviewed the patient's [id number]-[id number]-nuoatcxyc medications and allergies for accuracy.    The list below reflectives the updated PTA list. Please review each medication in order reconciliation for additional clarification and justification.     Medications Prior to Admission   Medication Sig Dispense Refill Last Dose    Eliquis 2.5 mg tablet Take 1 tablet (2.5 mg) by mouth 2 times a day.       potassium chloride CR 10 mEq ER tablet Take 1 tablet (10 mEq) by mouth once daily. Do not crush, chew, or split.       tamsulosin (Flomax) 0.4 mg 24 hr capsule Take 1 capsule (0.4 mg) by mouth 2 times a day.       torsemide (Demadex) 20 mg tablet Take 2 tablets (40 mg) by mouth once daily.       vit C,P-Zp-nrkwz-lutein-zeaxan (PreserVision AREDS-2) 250-90-40-1 mg capsule Take 1 capsule by mouth once daily.        The list below reflectives the updated allergy list. Please review each documented allergy for additional clarification and justification.  Allergies  Reviewed by Neil Williamson PA-C on 10/8/2023   No Known Allergies       Sources: Pt interview, Epic dispense report/SureScripts, 10/4 wound note    Below are additional concerns with the patient's PTA list.  -Patient was on torsemide (20 mg- 2 tabs every day) and potassium chloride prior to admission, both have been discontinued since being admitted -Clindamycin sent to [facility] 10/4 for 14 DS    Samuel Rivers, LelandD

## 2023-10-08 NOTE — PROGRESS NOTES
Transitional Care Coordinator Note: Met with patient's spouse to discuss discharge planning s/p admission.  Patient lives home with spouse. Requires assistance ADL's. Requires assist devices for ambulation.  Patient with active home care( [facility]- for [facility] care currently) and previously had therapy as well. Per spouse okay to resume with agency.  Demographics and contact information confirmed.  Will continue to monitor patient for all home going needs.  Danae Pereira RN TCC via doc halo.

## 2023-10-09 LAB
ALBUMIN SERPL BCP-MCNC: 2.9 G/DL (ref 3.4–5)
ANION GAP SERPL CALC-SCNC: 17 MMOL/L (ref 10–20)
BUN SERPL-MCNC: 65 MG/DL (ref 6–23)
CALCIUM SERPL-MCNC: 8.9 MG/DL (ref 8.6–10.6)
CHLORIDE SERPL-SCNC: 100 MMOL/L (ref 98–107)
CO2 SERPL-SCNC: 26 MMOL/L (ref 21–32)
CREAT SERPL-MCNC: 1.48 MG/DL (ref 0.5–1.3)
ERYTHROCYTE [DISTWIDTH] IN BLOOD BY AUTOMATED COUNT: 21.7 % (ref 11.5–14.5)
GFR SERPL CREATININE-BSD FRML MDRD: 45 ML/MIN/1.73M*2
GLUCOSE SERPL-MCNC: 143 MG/DL (ref 74–99)
HCT VFR BLD AUTO: 37.5 % (ref 41–52)
HGB BLD-MCNC: 11.4 G/DL (ref 13.5–17.5)
MCH RBC QN AUTO: 23.8 PG (ref 26–34)
MCHC RBC AUTO-ENTMCNC: 30.4 G/DL (ref 32–36)
MCV RBC AUTO: 78 FL (ref 80–100)
NRBC BLD-RTO: 0.3 /100 WBCS (ref 0–0)
PHOSPHATE SERPL-MCNC: 3.8 MG/DL (ref 2.5–4.9)
PLATELET # BLD AUTO: 79 X10*3/UL (ref 150–450)
PMV BLD AUTO: ABNORMAL FL
POTASSIUM SERPL-SCNC: 3.7 MMOL/L (ref 3.5–5.3)
RBC # BLD AUTO: 4.8 X10*6/UL (ref 4.5–5.9)
SODIUM SERPL-SCNC: 139 MMOL/L (ref 136–145)
WBC # BLD AUTO: 6.2 X10*3/UL (ref 4.4–11.3)

## 2023-10-09 PROCEDURE — 2500000004 HC RX 250 GENERAL PHARMACY W/ HCPCS (ALT 636 FOR OP/ED)

## 2023-10-09 PROCEDURE — 2500000004 HC RX 250 GENERAL PHARMACY W/ HCPCS (ALT 636 FOR OP/ED): Performed by: NURSE PRACTITIONER

## 2023-10-09 PROCEDURE — 85027 COMPLETE CBC AUTOMATED: CPT

## 2023-10-09 PROCEDURE — 2500000001 HC RX 250 WO HCPCS SELF ADMINISTERED DRUGS (ALT 637 FOR MEDICARE OP): Performed by: STUDENT IN AN ORGANIZED HEALTH CARE EDUCATION/TRAINING PROGRAM

## 2023-10-09 PROCEDURE — 36415 COLL VENOUS BLD VENIPUNCTURE: CPT

## 2023-10-09 PROCEDURE — 2500000001 HC RX 250 WO HCPCS SELF ADMINISTERED DRUGS (ALT 637 FOR MEDICARE OP)

## 2023-10-09 PROCEDURE — 99233 SBSQ HOSP IP/OBS HIGH 50: CPT | Performed by: INTERNAL MEDICINE

## 2023-10-09 PROCEDURE — 1200000002 HC GENERAL ROOM WITH TELEMETRY DAILY

## 2023-10-09 PROCEDURE — 80069 RENAL FUNCTION PANEL: CPT

## 2023-10-09 RX ORDER — POTASSIUM CHLORIDE 1.5 G/1.58G
40 POWDER, FOR SOLUTION ORAL ONCE
Status: COMPLETED | OUTPATIENT
Start: 2023-10-09 | End: 2023-10-09

## 2023-10-09 RX ORDER — FUROSEMIDE 10 MG/ML
80 INJECTION INTRAMUSCULAR; INTRAVENOUS ONCE
Status: COMPLETED | OUTPATIENT
Start: 2023-10-09 | End: 2023-10-09

## 2023-10-09 RX ADMIN — APIXABAN 2.5 MG: 2.5 TABLET, FILM COATED ORAL at 21:31

## 2023-10-09 RX ADMIN — POTASSIUM CHLORIDE 40 MEQ: 1.5 POWDER, FOR SOLUTION ORAL at 01:30

## 2023-10-09 RX ADMIN — FUROSEMIDE 10 MG/HR: 10 INJECTION, SOLUTION INTRAMUSCULAR; INTRAVENOUS at 10:45

## 2023-10-09 RX ADMIN — TAMSULOSIN HYDROCHLORIDE 0.4 MG: 0.4 CAPSULE ORAL at 21:31

## 2023-10-09 RX ADMIN — FUROSEMIDE 80 MG: 10 INJECTION, SOLUTION INTRAMUSCULAR; INTRAVENOUS at 11:20

## 2023-10-09 RX ADMIN — TAMSULOSIN HYDROCHLORIDE 0.4 MG: 0.4 CAPSULE ORAL at 08:19

## 2023-10-09 RX ADMIN — APIXABAN 2.5 MG: 2.5 TABLET, FILM COATED ORAL at 08:19

## 2023-10-09 ASSESSMENT — COGNITIVE AND FUNCTIONAL STATUS - GENERAL
MOVING FROM LYING ON BACK TO SITTING ON SIDE OF FLAT BED WITH BEDRAILS: A LITTLE
TOILETING: A LITTLE
CLIMB 3 TO 5 STEPS WITH RAILING: A LOT
MOVING TO AND FROM BED TO CHAIR: A LITTLE
TURNING FROM BACK TO SIDE WHILE IN FLAT BAD: A LITTLE
MOVING FROM LYING ON BACK TO SITTING ON SIDE OF FLAT BED WITH BEDRAILS: A LITTLE
HELP NEEDED FOR BATHING: A LITTLE
MOBILITY SCORE: 17
MOBILITY SCORE: 18
DRESSING REGULAR UPPER BODY CLOTHING: A LITTLE
DRESSING REGULAR LOWER BODY CLOTHING: A LITTLE
DRESSING REGULAR UPPER BODY CLOTHING: A LITTLE
STANDING UP FROM CHAIR USING ARMS: A LITTLE
HELP NEEDED FOR BATHING: A LITTLE
WALKING IN HOSPITAL ROOM: A LITTLE
CLIMB 3 TO 5 STEPS WITH RAILING: A LITTLE
MOVING TO AND FROM BED TO CHAIR: A LITTLE
DAILY ACTIVITIY SCORE: 20
DRESSING REGULAR UPPER BODY CLOTHING: A LITTLE
STANDING UP FROM CHAIR USING ARMS: A LITTLE
CLIMB 3 TO 5 STEPS WITH RAILING: A LOT
TURNING FROM BACK TO SIDE WHILE IN FLAT BAD: A LITTLE
WALKING IN HOSPITAL ROOM: A LITTLE
WALKING IN HOSPITAL ROOM: A LITTLE
MOVING TO AND FROM BED TO CHAIR: A LITTLE
MOBILITY SCORE: 17
DAILY ACTIVITIY SCORE: 20
DRESSING REGULAR LOWER BODY CLOTHING: A LITTLE
MOVING FROM LYING ON BACK TO SITTING ON SIDE OF FLAT BED WITH BEDRAILS: A LITTLE
TOILETING: A LITTLE
DRESSING REGULAR LOWER BODY CLOTHING: A LITTLE
STANDING UP FROM CHAIR USING ARMS: A LITTLE
HELP NEEDED FOR BATHING: A LITTLE
DAILY ACTIVITIY SCORE: 20
TURNING FROM BACK TO SIDE WHILE IN FLAT BAD: A LITTLE
TOILETING: A LITTLE

## 2023-10-09 ASSESSMENT — PAIN SCALES - GENERAL
PAINLEVEL_OUTOF10: 0 - NO PAIN

## 2023-10-09 ASSESSMENT — PAIN - FUNCTIONAL ASSESSMENT
PAIN_FUNCTIONAL_ASSESSMENT: 0-10

## 2023-10-09 NOTE — CARE PLAN
Problem: Safety  Goal: Patient will be injury free during hospitalization  10/9/2023 0427 by Antoni Castro RN  Outcome: Progressing  10/9/2023 0426 by Antoni Castro RN  Outcome: Progressing  10/9/2023 0423 by Antoni Castro RN  Outcome: Progressing  Goal: I will remain free of falls  10/9/2023 0427 by Antoni Castro RN  Outcome: Progressing  10/9/2023 0426 by Antoni Castro RN  Outcome: Progressing  10/9/2023 0423 by Antoni Castro RN  Outcome: Progressing     Problem: Daily Care  Goal: Daily care needs are met  10/9/2023 0427 by Antoni Castro RN  Outcome: Progressing  10/9/2023 0426 by Antoni Castro RN  Outcome: Progressing  10/9/2023 0423 by Antoni Castro RN  Outcome: Progressing     Problem: Psychosocial Needs  Goal: Demonstrates ability to cope with hospitalization/illness  10/9/2023 0427 by Antoni Castro RN  Outcome: Progressing  10/9/2023 0426 by Antoni Casrto RN  Outcome: Progressing  10/9/2023 0423 by Antoni Castro RN  Outcome: Progressing  Goal: Collaborate with me, my family, and caregiver to identify my specific goals  10/9/2023 0427 by Antoni Castro RN  Outcome: Progressing  10/9/2023 0426 by Antoni Castro RN  Outcome: Progressing  10/9/2023 0423 by Antoni Castro RN  Outcome: Progressing     Problem: Discharge Barriers  Goal: My discharge needs are met  10/9/2023 0427 by Antoni Castro RN  Outcome: Progressing  10/9/2023 0426 by Antoni Castro RN  Outcome: Progressing  10/9/2023 0423 by Antoni Castro RN  Outcome: Progressing     Problem: Fall/Injury  Goal: Not fall by end of shift  10/9/2023 0427 by Antoni Castro RN  Outcome: Progressing  10/9/2023 0426 by Antoni Castro RN  Outcome: Progressing  10/9/2023 0423 by Antoni Castro RN  Outcome: Progressing  Goal: Be free from injury by end of the shift  10/9/2023 0427 by Antoni Castro RN  Outcome: Progressing  10/9/2023 0426 by Harneet Castro, RN  Outcome: Progressing  10/9/2023 0423 by Antoni Castro, RN  Outcome: Progressing  Goal: Verbalize understanding of  personal risk factors for fall in the hospital  10/9/2023 0427 by Antoni Castro RN  Outcome: Progressing  10/9/2023 0426 by Antoni Castro RN  Outcome: Progressing  10/9/2023 0423 by Antoni Castro RN  Outcome: Progressing     Problem: Pain  Goal: Takes deep breaths with improved pain control throughout the shift  10/9/2023 0427 by Antoni Castro RN  Outcome: Progressing  10/9/2023 0426 by Antoni Castro RN  Outcome: Progressing  10/9/2023 0423 by Antoni Castro RN  Outcome: Progressing  Goal: Turns in bed with improved pain control throughout the shift  10/9/2023 0427 by Antoni Castro RN  Outcome: Progressing  10/9/2023 0426 by Antoni Castro RN  Outcome: Progressing  10/9/2023 0423 by Antoni Castro RN  Outcome: Progressing  Goal: Walks with improved pain control throughout the shift  10/9/2023 0427 by Antoni Castro RN  Outcome: Progressing  10/9/2023 0426 by Antoni Castro RN  Outcome: Progressing  10/9/2023 0423 by Antoni Castro RN  Outcome: Progressing  Goal: Performs ADL's with improved pain control throughout shift  10/9/2023 0427 by Antoni Castro RN  Outcome: Progressing  10/9/2023 0426 by Antoni Castro RN  Outcome: Progressing  10/9/2023 0423 by Antoni Castro RN  Outcome: Progressing  Goal: Participates in PT with improved pain control throughout the shift  10/9/2023 0427 by Antoni Castro RN  Outcome: Progressing  10/9/2023 0426 by Antoni Castro RN  Outcome: Progressing  10/9/2023 0423 by Antoni Castro RN  Outcome: Progressing     Problem: Skin  Goal: Participates in plan/prevention/treatment measures  10/9/2023 0427 by Antoni Castro RN  Outcome: Progressing  10/9/2023 0426 by Antoni Castro RN  Outcome: Progressing  10/9/2023 0423 by Antoni Castro RN  Outcome: Progressing  Goal: Prevent/manage excess moisture  10/9/2023 0427 by Antoni Castro RN  Outcome: Progressing  10/9/2023 0426 by Harneet Castro, RN  Outcome: Progressing  10/9/2023 0423 by Antoni Castro RN  Outcome: Progressing  Goal: Prevent/minimize  sheer/friction injuries  10/9/2023 0427 by Antoni Castro RN  Outcome: Progressing  10/9/2023 0426 by Antoni Castro RN  Outcome: Progressing  10/9/2023 0423 by Antoni Castro RN  Outcome: Progressing  Goal: Promote/optimize nutrition  10/9/2023 0427 by Antoni Castro RN  Outcome: Progressing  10/9/2023 0426 by Antoni Castro RN  Outcome: Progressing  10/9/2023 0423 by Antoni Castro RN  Outcome: Progressing   The patient's goals for the shift include      The clinical goals for the shift include Pt will maintain SpO2 above 93% while on RA.    Over the shift, the patient did not make progress toward the following goals. Barriers to progression include . Recommendations to address these barriers include .

## 2023-10-09 NOTE — CARE PLAN
The patient's goals for the shift include      The clinical goals for the shift include patient will not have a fall through out this shift.    Over the shift, the patient did not make progress toward the following goals. Barriers to progression include impaired hearing. Recommendations to address these barriers include teach back.

## 2023-10-09 NOTE — PROGRESS NOTES
Subjective Data:  Remains severely hypervolemic. Unclear UO as patient had been incontinent overnight.     - lasix 80mg IV once and increase gtt to 20mg/hr, will reassess    Overnight Events:       Objective Data:  Last Recorded Vitals:  Vitals:    10/09/23 0501 10/09/23 0731 10/09/23 1100 10/09/23 1226   BP: 101/61 119/67  112/59   BP Location: Left arm      Patient Position:  Lying     Pulse: 59 61  (!) 45   Resp: 16 16  20   Temp: 35.8 °C (96.5 °F) 36.2 °C (97.1 °F)  35.8 °C (96.4 °F)   TempSrc: Temporal Temporal  Temporal   SpO2:  95%  97%   Weight:   84.9 kg (187 lb 2.7 oz)    Height:           Last Labs:  CBC - 10/8/2023:  9:19 PM  5.1 11.8 82    37.0      CMP - 10/8/2023:  9:19 PM  9.0 6.6 24 --- 1.3   4.0 3.1 15 102      PTT - 10/5/2023:  4:51 PM  2.3   26.1 43     TROPHS   Date/Time Value Ref Range Status   10/06/2023 10:43 PM 21 0 - 20 ng/L Final   10/05/2023 04:51 PM 22 0 - 20 ng/L Final   03/21/2023 05:30 AM 25 0 - 20 ng/L Final     Comment:     .  Less than 99th percentile of normal range cutoff-  Female and children under 18 years old <14 ng/L; Male <21 ng/L: Negative  Repeat testing should be performed if clinically indicated.   .  Female and children under 18 years old 14-50 ng/L; Male 21-50 ng/L:  Consistent with possible cardiac damage and possible increased clinical   risk. Serial measurements may help to assess extent of myocardial damage.   .  >50 ng/L: Consistent with cardiac damage, increased clinical risk and  myocardial infarction. Serial measurements may help assess extent of   myocardial damage.   .   NOTE: Children less than 1 year old may have higher baseline troponin   levels and results should be interpreted in conjunction with the overall   clinical context.   .  NOTE: Troponin I testing is performed using a different   testing methodology at [facility] than at other   [facility] hospitals. Direct result comparisons should only   be made within the same method.      03/20/2023 02:05 PM 23 0 - 20 ng/L Final     Comment:     .  Less than 99th percentile of normal range cutoff-  Female and children under 18 years old <14 ng/L; Male <21 ng/L: Negative  Repeat testing should be performed if clinically indicated.   .  Female and children under 18 years old 14-50 ng/L; Male 21-50 ng/L:  Consistent with possible cardiac damage and possible increased clinical   risk. Serial measurements may help to assess extent of myocardial damage.   .  >50 ng/L: Consistent with cardiac damage, increased clinical risk and  myocardial infarction. Serial measurements may help assess extent of   myocardial damage.   .   NOTE: Children less than 1 year old may have higher baseline troponin   levels and results should be interpreted in conjunction with the overall   clinical context.   .  NOTE: Troponin I testing is performed using a different   testing methodology at [facility] than at other   [facility]. Direct result comparisons should only   be made within the same method.       BNP   Date/Time Value Ref Range Status   10/05/2023 04:51  0 - 99 pg/mL Final   06/20/2023 09:55  0 - 99 pg/mL Final     Comment:     .  <100 pg/mL - Heart failure unlikely  100-299 pg/mL - Intermediate probability of acute heart  .               failure exacerbation. Correlate with clinical  .               context and patient history.    >=300 pg/mL - Heart Failure likely. Correlate with clinical  .               context and patient history.   Biotin interference may cause falsely decreased results.   Patients taking a Biotin dose of up to 5 mg/day should   refrain from taking Biotin for 24 hours before sample   collection. Providers may contact their local laboratory   for further information.     04/26/2023 09:14  0 - 99 pg/mL Final     Comment:     .  <100 pg/mL - Heart failure unlikely  100-299 pg/mL - Intermediate probability of acute heart  .               failure exacerbation.  "Correlate with clinical  .               context and patient history.    >=300 pg/mL - Heart Failure likely. Correlate with clinical  .               context and patient history.   Biotin interference may cause falsely decreased results.   Patients taking a Biotin dose of up to 5 mg/day should   refrain from taking Biotin for 24 hours before sample   collection. Providers may contact their local laboratory   for further information.       HGBA1C   Date/Time Value Ref Range Status   03/21/2023 05:30 AM 6.6 % Final     Comment:          Diagnosis of Diabetes-Adults   Non-Diabetic: < or = 5.6%   Increased risk for developing diabetes: 5.7-6.4%   Diagnostic of diabetes: > or = 6.5%  .       Monitoring of Diabetes                Age (y)     Therapeutic Goal (%)   Adults:          >18           <7.0   Pediatrics:    13-18           <7.5                   7-12           <8.0                   0- 6            7.5-8.5   American Diabetes Association. Diabetes Care 33(S1), Jan 2010.       VLDL   Date/Time Value Ref Range Status   03/21/2023 05:30 AM 8 0 - 40 mg/dL Final      Last I/O:  I/O last 3 completed shifts:  In: 3.5 (0 mL/kg) [I.V.:3.5 (0 mL/kg)]  Out: 801 (8.8 mL/kg) [Urine:800 (0.2 mL/kg/hr); Other:1]  Weight: 91.3 kg     Past Cardiology Tests (Last 3 Years):  EKG:  No results found for this or any previous visit from the past 1095 days.    Echo:  No results found for this or any previous visit from the past 1095 days.    Ejection Fractions:  No results found for: \"EF\"  Cath:  No results found for this or any previous visit from the past 1095 days.    Stress Test:  No results found for this or any previous visit from the past 1095 days.    Cardiac Imaging:  No results found for this or any previous visit from the past 1095 days.      Inpatient Medications:  Scheduled medications   Medication Dose Route Frequency    apixaban  2.5 mg oral BID    polyethylene glycol  17 g oral Daily    tamsulosin  0.4 mg oral BID "     PRN medications   Medication     Continuous Medications   Medication Dose Last Rate    furosemide  20 mg/hr 20 mg/hr (10/09/23 1102)       Physical Exam:  General: NAD, lying in bed  Skin: warm and dry   Head/neck: JVD to mid neck at 90 degrees  Cardiac: S1S2, +systolic murmur   Pulm: CTAB, room air   GI: soft, nontender   Extremities: 2-3+ bilat LE pitting edema to thighs   Neuro: mumbling, no focal neuro deficits   Psych: appropriate mod and behavior       Assessment/Plan     Acute on chronic diastolic heart failure  Severe MR & TR  - follows with Dr. Mack  - HAFSA 5/8/23 EF 55%, LA moderately dilated, RA severely dilated, severe MR, severe TR, small PFO (10 bubbles), plaque visualized in descending & transverse aorta  - L/RHC 5/08/2023 No angiographically significant CAD in right-dominant circulation. Elevated filling/pulmonary pressures. Preserved CO/CI by assumed Francisco method.  - CXR in [place] ED 10/5 small to moderate right pleural effusion  - repeat CXR pending  - 10/19 scheduled for MitraClip +/- tricuspid clip with Attizzani and Elgudin (hold Eliquis 3 days prior)  - Structural heart consulted, pending recs  - admit BNP on 10/5: 508 (previously 570 in 6/2023)  - HS trop: 22 --> 21  - admit wt at [place] 90.7 kg  - today's wt 84.9kg (91.3)   - significantly hypervolemic on exam  - unclear I/Os since admit 2/2 incontinence  - lasix 80mg IV once and increase gtt to 20mg/hr   - consider initiation of spironolactone  - consider initiation of SLGT2i following DENIS  - holding home torsemide 20mg  - Daily standing weights, strict I/Os, 2g sodium diet, 2L fluid restriction     Atrial fibrillation with slow ventricular response   - per Dr. Mack: rate is reasonably well controlled, do not think restoration of sinus rhythm will have any benefit. Given his fall risk, and nosebleeds we can consider him for Watchman procedure. We will defer this until his valves are addressed.  - baseline HR 40-60s, asymptomatic   - c/w  Eliquis 2.5mg BID     Right pretibial venous ulcer  PVD  - wound care followed patient as an outpatient and while inpatient at [place]  - had been every other day dressing changes  - wound care consulted, appreciate recs  -  to assess status of wound and ability to proceed with MitraClip as scheduled     Delayed speech  - patient exhibiting word slurring/trouble word finding on exam; although A&Ox3  - wife attributed to patient's current frustration  - Dr. Mack confirmed this is his observable baseline from previous office visits  - Dr. Mack recommending CT head if he has not received one previously  - OT consulted for MOCA score, pending evaluation   - mumbling speech today, deferring head CT for now      CKD III, stable   - baseline: 1.3-1.6  - Admit Cr: 1.47  - Cr trend 1.56 (1.61) (1.36) (1.51)  - diuresis as above     BPH  - c/w home tamsulosin         DVT ppx: Eliquis 2.5mg BID    Dispo   pending diuresis, wound care assessment, structural heart evaluation pending MitraClip scheduled for 10/19, PT/OT to assess, MOCA    Code Status  Full Code      Seen and discussed with Dr. Sue Campos, APRN-CNP

## 2023-10-09 NOTE — HOSPITAL COURSE
Rajendra Chavis is a 88 y.o. male with PMHx of diastolic CHF (EF 50-55%), severe MR & TR, CKD 3, Afib on Eliquis, and R pretibial venous ulcer who presented to [facility] on 10/5/23 with complaints of worsened SOB, fatigue and abdominal/ leg edema. Patient was instructed to report to ED by his cardiologist for evaluation and admission to [facility] for optimization of his heart disease prior to DENIS scheduled on 10/19. Patient follows with Godfrey Mack, Jessy and Ramicone. Last echo in 5/2023 with EF 55%, severe MR & TR, mild AR. While awaiting a bed at [facility], patient was started on lasix ggt at rate of 5 on admission at [place]. Transfer to [facility] for further optimization of CHF prior to scheduled DENIS.      On admission to [facility], patient was a poor historian. I received further history from his wife, Sunita. Patient reports continued SOB with exertion, denying SOB at rest. He notes that his LE edema has been persistent. He denies any chest pain, palpitation, abdominal pain, fever, chills, or malaise. He voiced frustration about his multiple hospitalizations and being passed around team to team. He voiced some understanding about upcoming MitraClip; however further education was given at bedside about procedure.      His wife reports that Rajendra is very frustrated about his current state of health and is anxiously awaiting intervention. She notes that she has noticed gradual and persistent worsening LE swelling for quite some time now, not noticing improvement with his daily torsemide. She reports that she has kept to the strict 2g sodium and 2L fluid restriction at home to no avail. When asked about his typical baseline, she notes he is independent and was just driving and going to the grocery store a few days ago without limitation. When asked about his baseline and the observable word slurring / trouble word finding on exam, she attributes to his current frustration. Dr. Mack confirmed that his is patient's baseline.    Patient has a RN that comes to the home every other day, wife confirms his HR is historically low in 40-50s with controlled SBP of 100-110s.     Floor Course  Patient originally started on lasix gtt + daily boluses and intermittent metolazone without significant UO. Switched to bumex gtt + boluses ##  Structural Team notified of admission, would prefer patient be optimized and discharged prior to procedure. ##    Afib with SVR HR 30-40s on tele, typical baseline 40-60s (confirmed by family and home care). Patient remained asymptomatic. EP consulted, who recommending cardioversion following aggressive diuresis ##.     Heme consulted for thrombocytopenia. Labs revealed ##.     On 10/12, patient was noted to have acute swelling and redness of R arm and elbow proximal to IV (connected to lasix gtt). Area is non tender with palpation. Warm to touch. Instructed RN to removed R PIV and replace on L arm. ACE wrap applied to R arm, to remain elevated with PRN ice packs for swelling. On 10/13, erythema worsened and small fluid filled blister formed #. Suspicious for IV infiltrate as patient has been relatively non responsive to aggressive lasix gtt vs cellulitis vs thrombophlebitis.  10/12 RUE venous duplex U/S negative for DVT. Dermatology consulted ##.    Wound care was consulted for updated recommendations on patient's preexisting R pretibial venous ulcer: recommended to keep legs elevated to reduce edema. Float heels. Change RLE (ankle/shin) dressing q3 days using Medihoney (order from [facility], oracle #[id number]), Adaptic (ie., Curity non-adherent strips), Aquacel Ag and Mepilex foam to secure. Apply lotion to dry periwound skin and LLE.      Discharge weight: ### kg    After all labs and VS were reviewed the decision was made that the patient was medically stable for discharge.  The patient was discharged in satisfactory condition.    More than 30 minutes were spent in coordinating patient discharge.

## 2023-10-09 NOTE — CARE PLAN
The patient's goals for the shift include      The clinical goals for the shift include  Pt will not have a fall through out the shift.    Over the shift, the patient did not make progress toward the following goals. Barriers to progression include . Recommendations to address these barriers include .

## 2023-10-09 NOTE — PROGRESS NOTES
Wound Care Progress Note     Visit Date: 10/9/2023      Patient Name: Rajendra Chavis         MRN: [id number]                Reason for Visit: assess and treat RLE wound        Wound History: pt with hx of PVD/venous stasis    Wound Assessment:  Wound 10/07/23 Venous Ulcer Pretibial Right (Active)   Date First Assessed/Time First Assessed: 10/07/23 1047   Present on Original Admission: Yes  Primary Wound Type: Venous Ulcer  Location: Pretibial  Wound Location Orientation: Right      Assessments 10/9/2023  1:58 PM   Site Assessment Yellow;Red;Pink;Granulation;Fibrinous   Reina-Wound Assessment Dry;Yellow-brown (Hemosiderin staining)   Non-staged Wound Description Full thickness   Wound Length (cm) 10 cm   Wound Width (cm) 3.5 cm   Wound Surface Area (cm^2) 35 cm^2   Wound Depth (cm) 0.2 cm   Wound Volume (cm^3) 7 cm^3   State of Healing Early/partial granulation;Slough   Wound Bed Slough (%) 20 %   Margins Well-defined edges   Drainage Description Serosanguineous   Drainage Amount Small   Dressing Foam;Non adherent;Silver dressing;Silicone border dressing;Hydrofiber   Dressing Status Other (Comment)       Active Orders   Date Order Priority Status Authorizing Provider   10/08/23 1131 Inpatient Consult to Wound and Ostomy Nurse Routine Active Neil Williamson PA-C     - Reason for Consult?:    Wound        Wound 10/07/23 Venous Ulcer Pretibial Right (Active)   Date First Assessed/Time First Assessed: 10/07/23 1047   Present on Original Admission: Yes  Primary Wound Type: Venous Ulcer  Location: Pretibial  Wound Location Orientation: Right   Number of days: 2     Wound 10/07/23 Venous Ulcer Pretibial Right (Active)   Wound Image   10/07/23 1048   Site Assessment Yellow;Red;Pink;Granulation;Fibrinous 10/09/23 1358   Reina-Wound Assessment Dry;Yellow-brown (Hemosiderin staining) 10/09/23 1358   Non-staged Wound Description Full thickness 10/09/23 1358   Wound Length (cm) 10 cm 10/09/23 1358   Wound Width (cm) 3.5 cm  10/09/23 1358   Wound Surface Area (cm^2) 35 cm^2 10/09/23 1358   Wound Depth (cm) 0.2 cm 10/09/23 1358   Wound Volume (cm^3) 7 cm^3 10/09/23 1358   State of Healing Early/partial granulation;Slough 10/09/23 1358   Wound Bed Slough (%) 20 % 10/09/23 1358   Tunneling 0 cm 10/07/23 1048   Margins Well-defined edges 10/09/23 1358   Drainage Description Serosanguineous 10/09/23 1358   Drainage Amount Small 10/09/23 1358   Dressing Foam;Non adherent;Silver dressing;Silicone border dressing;Hydrofiber 10/09/23 1358   Dressing Status Other (Comment) 10/09/23 1358     Wound Summary Assessment: Pt with what appears to be chronic venous stasis/PVD in the setting of CHF. Both lower legs dry with mild edema. Characteristic hemosiderin staining noted. R lateral shin wound appears full thickness with small amount of yellow slough cover over otherwise clean, moist, friable red/pink tissue. No odor or s/s of infection noted. Wound edges very fragile. Wound cleaned and redressed with Medihoney, Adaptic (to prevent next layer from sticking), Aquacel Ag and Mepilex foam to secure. Surrounding dry periwound skin moisturized with lotion. Dressing recs below.      Wound Team Plan: Keep legs elevated to reduce edema. Float heels. Change RLE (ankle/shin) dressing q3 days using Medihoney (order from [facility], oracle #[id number]), Adaptic (ie., Curity non-adherent strips), Aquacel Ag and Mepilex foam to secure. Apply lotion to dry periwound skin and LLE.    Provider, please review recs above and write wound care orders accordingly.      Jillian Colbert RN, CWON  10/9/2023  2:00 PM

## 2023-10-09 NOTE — CARE PLAN
Problem: Safety  Goal: Patient will be injury free during hospitalization  10/9/2023 0426 by Antoni Castro RN  Outcome: Progressing  10/9/2023 0423 by Antoni Castro RN  Outcome: Progressing  Goal: I will remain free of falls  10/9/2023 0426 by Antoni Castro RN  Outcome: Progressing  10/9/2023 0423 by Antoni Castro RN  Outcome: Progressing     Problem: Daily Care  Goal: Daily care needs are met  10/9/2023 0426 by Antoni Castro RN  Outcome: Progressing  10/9/2023 0423 by Antoni Castro RN  Outcome: Progressing     Problem: Psychosocial Needs  Goal: Demonstrates ability to cope with hospitalization/illness  10/9/2023 0426 by Antoni Castro RN  Outcome: Progressing  10/9/2023 0423 by Antoni Castro RN  Outcome: Progressing  Goal: Collaborate with me, my family, and caregiver to identify my specific goals  10/9/2023 0426 by Antoni Castro RN  Outcome: Progressing  10/9/2023 0423 by Antoni Castro RN  Outcome: Progressing     Problem: Discharge Barriers  Goal: My discharge needs are met  10/9/2023 0426 by Antoni Castro RN  Outcome: Progressing  10/9/2023 0423 by Antoni Castro RN  Outcome: Progressing     Problem: Fall/Injury  Goal: Not fall by end of shift  10/9/2023 0426 by Antoni Castro RN  Outcome: Progressing  10/9/2023 0423 by Antoni Castro RN  Outcome: Progressing  Goal: Be free from injury by end of the shift  10/9/2023 0426 by Antoni Castro RN  Outcome: Progressing  10/9/2023 0423 by Antoni Castro RN  Outcome: Progressing  Goal: Verbalize understanding of personal risk factors for fall in the hospital  10/9/2023 0426 by Antoni Castro RN  Outcome: Progressing  10/9/2023 0423 by Antoni Castro RN  Outcome: Progressing     Problem: Pain  Goal: Takes deep breaths with improved pain control throughout the shift  10/9/2023 0426 by Antoni Castro RN  Outcome: Progressing  10/9/2023 0423 by Antoni Castro RN  Outcome: Progressing  Goal: Turns in bed with improved pain control throughout the shift  10/9/2023 0426 by Antoni  CORRINE Castro  Outcome: Progressing  10/9/2023 0423 by Antoni Castro RN  Outcome: Progressing  Goal: Walks with improved pain control throughout the shift  10/9/2023 0426 by Antoni Castro RN  Outcome: Progressing  10/9/2023 0423 by Antoni Castro RN  Outcome: Progressing  Goal: Performs ADL's with improved pain control throughout shift  10/9/2023 0426 by Antoni Castro RN  Outcome: Progressing  10/9/2023 0423 by Antoni Castro RN  Outcome: Progressing  Goal: Participates in PT with improved pain control throughout the shift  10/9/2023 0426 by Antoni Castro RN  Outcome: Progressing  10/9/2023 0423 by Antoni Castro RN  Outcome: Progressing     Problem: Skin  Goal: Participates in plan/prevention/treatment measures  10/9/2023 0426 by Antoni Castro RN  Outcome: Progressing  10/9/2023 0423 by Antoni Castro RN  Outcome: Progressing  Goal: Prevent/manage excess moisture  10/9/2023 0426 by Antoni Castro RN  Outcome: Progressing  10/9/2023 0423 by Antoni Castro RN  Outcome: Progressing  Goal: Prevent/minimize sheer/friction injuries  10/9/2023 0426 by Antoni Castro RN  Outcome: Progressing  10/9/2023 0423 by Antoni Castro RN  Outcome: Progressing  Goal: Promote/optimize nutrition  10/9/2023 0426 by Antoni Castro RN  Outcome: Progressing  10/9/2023 0423 by Antoni Castro RN  Outcome: Progressing   The patient's goals for the shift include   The clinical goals for the shift include Pt will maintain SpO2 above 93% while on RA.    Over the shift, the patient did not make progress toward the following goals. Barriers to progression include . Recommendations to address these barriers include .

## 2023-10-09 NOTE — CARE PLAN
Problem: Safety  Goal: Patient will be injury free during hospitalization  10/9/2023 0435 by Antoni Castro RN  Outcome: Progressing  10/9/2023 0427 by Antoni Castro RN  Outcome: Progressing  10/9/2023 0426 by Antoni Castro RN  Outcome: Progressing  10/9/2023 0423 by Antoni Castro RN  Outcome: Progressing  Goal: I will remain free of falls  10/9/2023 0435 by Antoni Castro RN  Outcome: Progressing  10/9/2023 0427 by Antoni Castro RN  Outcome: Progressing  10/9/2023 0426 by Antoni Castro RN  Outcome: Progressing  10/9/2023 0423 by Antoni Castro RN  Outcome: Progressing     Problem: Daily Care  Goal: Daily care needs are met  10/9/2023 0435 by Antoni Castro RN  Outcome: Progressing  10/9/2023 0427 by Antoni Castro RN  Outcome: Progressing  10/9/2023 0426 by Antoni Castro RN  Outcome: Progressing  10/9/2023 0423 by Antoni Castro RN  Outcome: Progressing     Problem: Psychosocial Needs  Goal: Demonstrates ability to cope with hospitalization/illness  10/9/2023 0435 by Antoni Castro RN  Outcome: Progressing  10/9/2023 0427 by Antoni Castor RN  Outcome: Progressing  10/9/2023 0426 by Antoni Castro RN  Outcome: Progressing  10/9/2023 0423 by Antoni Castro RN  Outcome: Progressing  Goal: Collaborate with me, my family, and caregiver to identify my specific goals  10/9/2023 0435 by Antoni Castro RN  Outcome: Progressing  10/9/2023 0427 by Antoni Castro RN  Outcome: Progressing  10/9/2023 0426 by Antoni Castro RN  Outcome: Progressing  10/9/2023 0423 by Antoni Castro RN  Outcome: Progressing     Problem: Discharge Barriers  Goal: My discharge needs are met  10/9/2023 0435 by Antoni Castro RN  Outcome: Progressing  10/9/2023 0427 by Antoni Castro RN  Outcome: Progressing  10/9/2023 0426 by Antoni Castro RN  Outcome: Progressing  10/9/2023 0423 by Antoni Castro RN  Outcome: Progressing     Problem: Fall/Injury  Goal: Not fall by end of shift  10/9/2023 0435 by Antoni Castro RN  Outcome: Progressing  10/9/2023 0427 by Antoni  CORRINE Castro  Outcome: Progressing  10/9/2023 0426 by Antoni Castro RN  Outcome: Progressing  10/9/2023 0423 by Antoni Castro RN  Outcome: Progressing  Goal: Be free from injury by end of the shift  10/9/2023 0435 by Antoni Castro RN  Outcome: Progressing  10/9/2023 0427 by Antoni Castro RN  Outcome: Progressing  10/9/2023 0426 by Antoni Castro RN  Outcome: Progressing  10/9/2023 0423 by Antoni Castro RN  Outcome: Progressing  Goal: Verbalize understanding of personal risk factors for fall in the hospital  10/9/2023 0435 by Antoni Castro RN  Outcome: Progressing  10/9/2023 0427 by Antoni Castro RN  Outcome: Progressing  10/9/2023 0426 by Antoni Castro RN  Outcome: Progressing  10/9/2023 0423 by Antoni Castro RN  Outcome: Progressing     Problem: Pain  Goal: Takes deep breaths with improved pain control throughout the shift  10/9/2023 0435 by Antoni Castro RN  Outcome: Progressing  10/9/2023 0427 by Antoni Castro RN  Outcome: Progressing  10/9/2023 0426 by Antoni Castro RN  Outcome: Progressing  10/9/2023 0423 by Antoni Castro RN  Outcome: Progressing  Goal: Turns in bed with improved pain control throughout the shift  10/9/2023 0435 by Antoni Castro RN  Outcome: Progressing  10/9/2023 0427 by Antoni Castro RN  Outcome: Progressing  10/9/2023 0426 by Antoni Castro RN  Outcome: Progressing  10/9/2023 0423 by Antoni Castro RN  Outcome: Progressing  Goal: Walks with improved pain control throughout the shift  10/9/2023 0435 by Antoni Castro, RN  Outcome: Progressing  10/9/2023 0427 by Antoni Castro RN  Outcome: Progressing  10/9/2023 0426 by Antoni Castro RN  Outcome: Progressing  10/9/2023 0423 by Antoni Castro RN  Outcome: Progressing  Goal: Performs ADL's with improved pain control throughout shift  10/9/2023 0435 by Antoni Castro RN  Outcome: Progressing  10/9/2023 0427 by Antoni Castro RN  Outcome: Progressing  10/9/2023 0426 by Antoni Castro RN  Outcome: Progressing  10/9/2023 0423 by Antoni Castro, RN  Outcome:  Progressing  Goal: Participates in PT with improved pain control throughout the shift  10/9/2023 0435 by Antoni Castro RN  Outcome: Progressing  10/9/2023 0427 by Antoni Castro RN  Outcome: Progressing  10/9/2023 0426 by Antoni Castro RN  Outcome: Progressing  10/9/2023 0423 by Antoni Castro RN  Outcome: Progressing     Problem: Skin  Goal: Participates in plan/prevention/treatment measures  10/9/2023 0435 by Antoni Castro RN  Outcome: Progressing  10/9/2023 0427 by Antoni Castro RN  Outcome: Progressing  10/9/2023 0426 by Antoni Castro RN  Outcome: Progressing  10/9/2023 0423 by Antoni Castro RN  Outcome: Progressing  Goal: Prevent/manage excess moisture  10/9/2023 0435 by Antoni Castro RN  Outcome: Progressing  10/9/2023 0427 by Antoni Castro RN  Outcome: Progressing  10/9/2023 0426 by Antoni Castro RN  Outcome: Progressing  10/9/2023 0423 by Antoni Castro RN  Outcome: Progressing  Goal: Prevent/minimize sheer/friction injuries  10/9/2023 0435 by Antoni Castro RN  Outcome: Progressing  10/9/2023 0427 by Antoni Castro RN  Outcome: Progressing  10/9/2023 0426 by Antoni Castro RN  Outcome: Progressing  10/9/2023 0423 by Antoni Castro RN  Outcome: Progressing  Goal: Promote/optimize nutrition  10/9/2023 0435 by Antoni Castro RN  Outcome: Progressing  10/9/2023 0427 by Antoni Castro RN  Outcome: Progressing  10/9/2023 0426 by Antoni Castro RN  Outcome: Progressing  10/9/2023 0423 by Antoni Castro RN  Outcome: Progressing   The patient's goals for the shift include      The clinical goals for the shift include patient will not have a fall through out this shift.    Over the shift, the patient did not make progress toward the following goals. Barriers to progression include . Recommendations to address these barriers include .

## 2023-10-10 ENCOUNTER — APPOINTMENT (OUTPATIENT)
Dept: WOUND CARE | Facility: CLINIC | Age: 88
End: 2023-10-10
Payer: MEDICARE

## 2023-10-10 LAB — MAGNESIUM SERPL-MCNC: 2.39 MG/DL (ref 1.6–2.4)

## 2023-10-10 PROCEDURE — 80069 RENAL FUNCTION PANEL: CPT

## 2023-10-10 PROCEDURE — 85027 COMPLETE CBC AUTOMATED: CPT

## 2023-10-10 PROCEDURE — 85045 AUTOMATED RETICULOCYTE COUNT: CPT

## 2023-10-10 PROCEDURE — 83735 ASSAY OF MAGNESIUM: CPT | Performed by: STUDENT IN AN ORGANIZED HEALTH CARE EDUCATION/TRAINING PROGRAM

## 2023-10-10 PROCEDURE — 99232 SBSQ HOSP IP/OBS MODERATE 35: CPT | Performed by: INTERNAL MEDICINE

## 2023-10-10 PROCEDURE — 36415 COLL VENOUS BLD VENIPUNCTURE: CPT | Performed by: STUDENT IN AN ORGANIZED HEALTH CARE EDUCATION/TRAINING PROGRAM

## 2023-10-10 PROCEDURE — 97530 THERAPEUTIC ACTIVITIES: CPT | Mod: GP

## 2023-10-10 PROCEDURE — 83615 LACTATE (LD) (LDH) ENZYME: CPT

## 2023-10-10 PROCEDURE — 2500000004 HC RX 250 GENERAL PHARMACY W/ HCPCS (ALT 636 FOR OP/ED): Performed by: STUDENT IN AN ORGANIZED HEALTH CARE EDUCATION/TRAINING PROGRAM

## 2023-10-10 PROCEDURE — 2500000004 HC RX 250 GENERAL PHARMACY W/ HCPCS (ALT 636 FOR OP/ED): Performed by: NURSE PRACTITIONER

## 2023-10-10 PROCEDURE — 2500000001 HC RX 250 WO HCPCS SELF ADMINISTERED DRUGS (ALT 637 FOR MEDICARE OP)

## 2023-10-10 PROCEDURE — 1200000002 HC GENERAL ROOM WITH TELEMETRY DAILY

## 2023-10-10 PROCEDURE — 2500000004 HC RX 250 GENERAL PHARMACY W/ HCPCS (ALT 636 FOR OP/ED)

## 2023-10-10 PROCEDURE — 36415 COLL VENOUS BLD VENIPUNCTURE: CPT

## 2023-10-10 PROCEDURE — 97110 THERAPEUTIC EXERCISES: CPT | Mod: GP

## 2023-10-10 PROCEDURE — 97162 PT EVAL MOD COMPLEX 30 MIN: CPT | Mod: GP

## 2023-10-10 RX ORDER — FUROSEMIDE 10 MG/ML
80 INJECTION INTRAMUSCULAR; INTRAVENOUS ONCE
Status: COMPLETED | OUTPATIENT
Start: 2023-10-10 | End: 2023-10-10

## 2023-10-10 RX ORDER — MAGNESIUM SULFATE HEPTAHYDRATE 40 MG/ML
2 INJECTION, SOLUTION INTRAVENOUS ONCE
Status: DISCONTINUED | OUTPATIENT
Start: 2023-10-10 | End: 2023-10-10

## 2023-10-10 RX ORDER — POTASSIUM CHLORIDE 20 MEQ/1
40 TABLET, EXTENDED RELEASE ORAL ONCE
Status: COMPLETED | OUTPATIENT
Start: 2023-10-10 | End: 2023-10-10

## 2023-10-10 RX ADMIN — FUROSEMIDE 20 MG/HR: 10 INJECTION, SOLUTION INTRAMUSCULAR; INTRAVENOUS at 06:29

## 2023-10-10 RX ADMIN — POLYETHYLENE GLYCOL 3350 17 G: 17 POWDER, FOR SOLUTION ORAL at 09:32

## 2023-10-10 RX ADMIN — TAMSULOSIN HYDROCHLORIDE 0.4 MG: 0.4 CAPSULE ORAL at 20:40

## 2023-10-10 RX ADMIN — APIXABAN 2.5 MG: 2.5 TABLET, FILM COATED ORAL at 09:32

## 2023-10-10 RX ADMIN — APIXABAN 2.5 MG: 2.5 TABLET, FILM COATED ORAL at 20:40

## 2023-10-10 RX ADMIN — POTASSIUM CHLORIDE 40 MEQ: 1500 TABLET, EXTENDED RELEASE ORAL at 02:36

## 2023-10-10 RX ADMIN — TAMSULOSIN HYDROCHLORIDE 0.4 MG: 0.4 CAPSULE ORAL at 09:32

## 2023-10-10 RX ADMIN — FUROSEMIDE 80 MG: 10 INJECTION, SOLUTION INTRAMUSCULAR; INTRAVENOUS at 09:32

## 2023-10-10 RX ADMIN — FUROSEMIDE 20 MG/HR: 10 INJECTION, SOLUTION INTRAMUSCULAR; INTRAVENOUS at 20:41

## 2023-10-10 ASSESSMENT — COGNITIVE AND FUNCTIONAL STATUS - GENERAL
MOBILITY SCORE: 17
MOVING TO AND FROM BED TO CHAIR: A LITTLE
MOBILITY SCORE: 15
TURNING FROM BACK TO SIDE WHILE IN FLAT BAD: A LITTLE
WALKING IN HOSPITAL ROOM: A LOT
WALKING IN HOSPITAL ROOM: A LOT
HELP NEEDED FOR BATHING: A LITTLE
STANDING UP FROM CHAIR USING ARMS: A LITTLE
MOBILITY SCORE: 15
TURNING FROM BACK TO SIDE WHILE IN FLAT BAD: A LITTLE
PERSONAL GROOMING: A LITTLE
EATING MEALS: A LITTLE
DAILY ACTIVITIY SCORE: 19
STANDING UP FROM CHAIR USING ARMS: A LITTLE
MOVING TO AND FROM BED TO CHAIR: A LITTLE
TURNING FROM BACK TO SIDE WHILE IN FLAT BAD: A LITTLE
MOVING TO AND FROM BED TO CHAIR: A LITTLE
MOVING FROM LYING ON BACK TO SITTING ON SIDE OF FLAT BED WITH BEDRAILS: A LITTLE
MOVING FROM LYING ON BACK TO SITTING ON SIDE OF FLAT BED WITH BEDRAILS: A LITTLE
DRESSING REGULAR LOWER BODY CLOTHING: A LITTLE
DRESSING REGULAR UPPER BODY CLOTHING: A LITTLE
DRESSING REGULAR UPPER BODY CLOTHING: A LITTLE
MOVING FROM LYING ON BACK TO SITTING ON SIDE OF FLAT BED WITH BEDRAILS: A LITTLE
PERSONAL GROOMING: A LITTLE
HELP NEEDED FOR BATHING: A LITTLE
STANDING UP FROM CHAIR USING ARMS: A LITTLE
WALKING IN HOSPITAL ROOM: A LITTLE
CLIMB 3 TO 5 STEPS WITH RAILING: A LOT
CLIMB 3 TO 5 STEPS WITH RAILING: TOTAL
DAILY ACTIVITIY SCORE: 18
CLIMB 3 TO 5 STEPS WITH RAILING: TOTAL
DRESSING REGULAR LOWER BODY CLOTHING: A LITTLE
TOILETING: A LITTLE
TOILETING: A LITTLE

## 2023-10-10 ASSESSMENT — PAIN - FUNCTIONAL ASSESSMENT
PAIN_FUNCTIONAL_ASSESSMENT: 0-10

## 2023-10-10 ASSESSMENT — PAIN SCALES - GENERAL
PAINLEVEL_OUTOF10: 0 - NO PAIN

## 2023-10-10 ASSESSMENT — ACTIVITIES OF DAILY LIVING (ADL): LACK_OF_TRANSPORTATION: NO

## 2023-10-10 NOTE — PROGRESS NOTES
Subjective Data:  Significant UO over the last 24hrs, remains hypervolemic. Spoke with family regarding plans for optimization then discharge prior to MitraClip. Structural asked to visit with family as well for questions.     - Heme consult for thrombocytopenia   - rebolus 80mg IV lasix and cont gtt at 20mg/hr     Overnight Events:       Objective Data:  Last Recorded Vitals:  Vitals:    10/10/23 0334 10/10/23 0458 10/10/23 0824 10/10/23 1134   BP: 110/63 110/60 94/59 (!) 107/48   BP Location:   Right arm Right arm   Patient Position:   Sitting Sitting   Pulse: 52 (!) 42 74 54   Resp: 18 20 18 18   Temp: 35.3 °C (95.6 °F)  36.6 °C (97.8 °F) 36.6 °C (97.8 °F)   TempSrc: Temporal  Temporal Temporal   SpO2: 95% 95% 95% 97%   Weight: 86.1 kg (189 lb 13.1 oz)      Height:           Last Labs:  CBC - 10/9/2023:  8:16 PM  6.2 11.4 79    37.5      CMP - 10/9/2023:  8:16 PM  8.9 6.6 24 --- 1.3   3.8 2.9 15 102      PTT - 10/5/2023:  4:51 PM  2.3   26.1 43     TROPHS   Date/Time Value Ref Range Status   10/06/2023 10:43 PM 21 0 - 20 ng/L Final   10/05/2023 04:51 PM 22 0 - 20 ng/L Final   03/21/2023 05:30 AM 25 0 - 20 ng/L Final     Comment:     .  Less than 99th percentile of normal range cutoff-  Female and children under 18 years old <14 ng/L; Male <21 ng/L: Negative  Repeat testing should be performed if clinically indicated.   .  Female and children under 18 years old 14-50 ng/L; Male 21-50 ng/L:  Consistent with possible cardiac damage and possible increased clinical   risk. Serial measurements may help to assess extent of myocardial damage.   .  >50 ng/L: Consistent with cardiac damage, increased clinical risk and  myocardial infarction. Serial measurements may help assess extent of   myocardial damage.   .   NOTE: Children less than 1 year old may have higher baseline troponin   levels and results should be interpreted in conjunction with the overall   clinical context.   .  NOTE: Troponin I testing is performed using  a different   testing methodology at [facility] than at other   [facility]. Direct result comparisons should only   be made within the same method.     03/20/2023 02:05 PM 23 0 - 20 ng/L Final     Comment:     .  Less than 99th percentile of normal range cutoff-  Female and children under 18 years old <14 ng/L; Male <21 ng/L: Negative  Repeat testing should be performed if clinically indicated.   .  Female and children under 18 years old 14-50 ng/L; Male 21-50 ng/L:  Consistent with possible cardiac damage and possible increased clinical   risk. Serial measurements may help to assess extent of myocardial damage.   .  >50 ng/L: Consistent with cardiac damage, increased clinical risk and  myocardial infarction. Serial measurements may help assess extent of   myocardial damage.   .   NOTE: Children less than 1 year old may have higher baseline troponin   levels and results should be interpreted in conjunction with the overall   clinical context.   .  NOTE: Troponin I testing is performed using a different   testing methodology at [facility] than at other   [facility]. Direct result comparisons should only   be made within the same method.       BNP   Date/Time Value Ref Range Status   10/05/2023 04:51  0 - 99 pg/mL Final   06/20/2023 09:55  0 - 99 pg/mL Final     Comment:     .  <100 pg/mL - Heart failure unlikely  100-299 pg/mL - Intermediate probability of acute heart  .               failure exacerbation. Correlate with clinical  .               context and patient history.    >=300 pg/mL - Heart Failure likely. Correlate with clinical  .               context and patient history.   Biotin interference may cause falsely decreased results.   Patients taking a Biotin dose of up to 5 mg/day should   refrain from taking Biotin for 24 hours before sample   collection. Providers may contact their local laboratory   for further information.     04/26/2023 09:14  0 -  "99 pg/mL Final     Comment:     .  <100 pg/mL - Heart failure unlikely  100-299 pg/mL - Intermediate probability of acute heart  .               failure exacerbation. Correlate with clinical  .               context and patient history.    >=300 pg/mL - Heart Failure likely. Correlate with clinical  .               context and patient history.   Biotin interference may cause falsely decreased results.   Patients taking a Biotin dose of up to 5 mg/day should   refrain from taking Biotin for 24 hours before sample   collection. Providers may contact their local laboratory   for further information.       HGBA1C   Date/Time Value Ref Range Status   03/21/2023 05:30 AM 6.6 % Final     Comment:          Diagnosis of Diabetes-Adults   Non-Diabetic: < or = 5.6%   Increased risk for developing diabetes: 5.7-6.4%   Diagnostic of diabetes: > or = 6.5%  .       Monitoring of Diabetes                Age (y)     Therapeutic Goal (%)   Adults:          >18           <7.0   Pediatrics:    13-18           <7.5                   7-12           <8.0                   0- 6            7.5-8.5   American Diabetes Association. Diabetes Care 33(S1), Jan 2010.       VLDL   Date/Time Value Ref Range Status   03/21/2023 05:30 AM 8 0 - 40 mg/dL Final      Last I/O:  I/O last 3 completed shifts:  In: 899.6 (10.4 mL/kg) [P.O.:840; I.V.:59.6 (0.7 mL/kg)]  Out: 2550 (29.6 mL/kg) [Urine:2550 (0.8 mL/kg/hr)]  Weight: 86.1 kg     Past Cardiology Tests (Last 3 Years):  EKG:  No results found for this or any previous visit from the past 1095 days.    Echo:  No results found for this or any previous visit from the past 1095 days.    Ejection Fractions:  No results found for: \"EF\"  Cath:  No results found for this or any previous visit from the past 1095 days.    Stress Test:  No results found for this or any previous visit from the past 1095 days.    Cardiac Imaging:  No results found for this or any previous visit from the past 1095 " days.      Inpatient Medications:  Scheduled medications   Medication Dose Route Frequency    apixaban  2.5 mg oral BID    polyethylene glycol  17 g oral Daily    tamsulosin  0.4 mg oral BID     PRN medications   Medication     Continuous Medications   Medication Dose Last Rate    furosemide  20 mg/hr 20 mg/hr (10/10/23 0629)       Physical Exam:  General: sitting in chair, NAD  Skin: warm and dry   Head/neck: JVD to mid neck at 90 degrees  Cardiac: S1S2, bradycardic, +systolic murmur   Pulm: bibasilar rhonchi  GI: soft, nontender   Extremities: 2-3+ bilat LE pitting edema to thighs   Neuro: mumbling, no focal neuro deficits   Psych: appropriate mood and behavior       Assessment/Plan     Acute on chronic diastolic heart failure  Severe MR & TR  - follows with Dr. Mack  - HAFSA 5/8/23 EF 55%, LA moderately dilated, RA severely dilated, severe MR, severe TR, small PFO (10 bubbles), plaque visualized in descending & transverse aorta  - L/RHC 5/08/2023 No angiographically significant CAD in right-dominant circulation. Elevated filling/pulmonary pressures. Preserved CO/CI by assumed [place] method.  - CXR in [place] ED 10/5 small to moderate right pleural effusion  - repeat CXR pending  - 10/19 scheduled for MitraClip +/- tricuspid clip with Reyna and Eleda (hold Eliquis 3 days prior)  - Structural heart consulted, plan for discharge prior to procedure   - admit BNP on 10/5: 508 (previously 570 in 6/2023)  - HS trop: 22 --> 21  - admit wt at [place] 90.7 kg  - today's wt 86.1kg (84.9) (91.3)   - remains hypervolemic on exam  - rebolus lasix 80mg IV once and cont gtt at 20mg/hr   - consider initiation of spironolactone  - consider initiation of SLGT2i following DENIS  - Daily standing weights, strict I/Os, 2g sodium diet, 2L fluid restriction  - home torsemide 20mg     Atrial fibrillation with slow ventricular response   - per Dr. Mack: rate is reasonably well controlled, do not think restoration of sinus rhythm will  have any benefit. Given his fall risk, and nosebleeds we can consider him for Watchman procedure. We will defer this until his valves are addressed.  - baseline HR 40-60s, asymptomatic   - c/w Eliquis 2.5mg BID     Right pretibial venous ulcer  PVD  - wound care followed patient as an outpatient and while inpatient at [facility]  - had been every other day dressing changes  - wound care consulted, appreciate recs  - SH to assess status of wound and ability to proceed with MitraClip as scheduled  - family has orders from ID for clindamycin prior to and after procedure      Delayed speech  - patient exhibiting word slurring/trouble word finding on exam; although A&Ox3  - wife attributed to patient's current frustration  - Dr. Mack confirmed this is his observable baseline from previous office visits  - Dr. Mack recommending CT head if he has not received one previously  - OT consulted for MOCA score, pending evaluation   - mumbling speech but A/Ox3 deferring head CT for now      CKD III, stable   - baseline: 1.3-1.6  - Admit Cr: 1.47  - Cr trend 1.48 (1.56) (1.61) (1.36) (1.51)  - diuresis as above    Thrombocytopenia  - unclear baseline   - plts low 100s in 3/2023, then normal in March and May   - admit plts 93  - today's plts 79 (82) (82)   - Heme consult     BPH  - c/w home tamsulosin         DVT ppx: Eliquis 2.5mg BID    Dispo   pending diuresis, wound care assessment, structural heart evaluation pending MitraClip scheduled for 10/19, PT/OT to assess, MOCA    Code Status  Full Code      Seen and discussed with Dr. Sue Campos, APRN-CNP

## 2023-10-10 NOTE — CARE PLAN
The patient's goals for the shift include  restful sleep  Problem: Safety  Goal: Patient will be injury free during hospitalization  10/10/2023 0246 by Violetta La RN  Outcome: Progressing  10/9/2023 1928 by Violetta La RN  Outcome: Progressing  10/9/2023 1919 by Violetta La RN  Outcome: Progressing  Goal: I will remain free of falls  10/10/2023 0246 by Violetta La RN  Outcome: Progressing  10/9/2023 1928 by Violetta La RN  Outcome: Progressing  10/9/2023 1919 by Violetta La RN  Outcome: Progressing     Problem: Daily Care  Goal: Daily care needs are met  10/10/2023 0246 by Violetta La RN  Outcome: Progressing  10/9/2023 1928 by Violetta La RN  Outcome: Progressing  10/9/2023 1919 by Violetta La RN  Outcome: Progressing     Problem: Psychosocial Needs  Goal: Demonstrates ability to cope with hospitalization/illness  10/10/2023 0246 by iVoletta La RN  Outcome: Progressing  10/9/2023 1928 by Violetta La RN  Outcome: Progressing  10/9/2023 1919 by Violetta La RN  Outcome: Progressing  Goal: Collaborate with me, my family, and caregiver to identify my specific goals  10/10/2023 0246 by Violetta La RN  Outcome: Progressing  10/9/2023 1928 by Violetta La RN  Outcome: Progressing  10/9/2023 1919 by Violetta La RN  Outcome: Progressing     Problem: Discharge Barriers  Goal: My discharge needs are met  10/10/2023 0246 by Violetta La RN  Outcome: Progressing  10/9/2023 1928 by Violetta La RN  Outcome: Progressing  10/9/2023 1919 by Violetta La RN  Outcome: Progressing     Problem: Fall/Injury  Goal: Not fall by end of shift  10/10/2023 0246 by Violetta La RN  Outcome: Progressing  10/9/2023 1928 by Violetta La RN  Outcome: Progressing  10/9/2023 1919 by Violetta La RN  Outcome: Progressing  Goal: Be free from injury by end of the shift  10/10/2023 0246 by Violetta La RN  Outcome: Progressing  10/9/2023 1928 by Violetta La RN  Outcome: Progressing  10/9/2023  1919 by Violetta La RN  Outcome: Progressing  Goal: Verbalize understanding of personal risk factors for fall in the hospital  10/10/2023 0246 by Violetta La RN  Outcome: Progressing  10/9/2023 1928 by Violetta La RN  Outcome: Progressing  10/9/2023 1919 by Violetta La, RN  Outcome: Progressing     Problem: Pain  Goal: Takes deep breaths with improved pain control throughout the shift  10/10/2023 0246 by Violetta La, RN  Outcome: Progressing  10/9/2023 1928 by Violetta La, RN  Outcome: Progressing  10/9/2023 1919 by Violetta La, RN  Outcome: Progressing  Goal: Turns in bed with improved pain control throughout the shift  10/10/2023 0246 by Violetta La, RN  Outcome: Progressing  10/9/2023 1928 by Violetta La, RN  Outcome: Progressing  10/9/2023 1919 by Violetta La, RN  Outcome: Progressing  Goal: Walks with improved pain control throughout the shift  10/10/2023 0246 by Violetta La, RN  Outcome: Progressing  10/9/2023 1928 by Violetta La, RN  Outcome: Progressing  10/9/2023 1919 by Violetta La, RN  Outcome: Progressing  Goal: Performs ADL's with improved pain control throughout shift  10/10/2023 0246 by Violetta La, RN  Outcome: Progressing  10/9/2023 1928 by Violetta La, RN  Outcome: Progressing  10/9/2023 1919 by Violetta La, RN  Outcome: Progressing  Goal: Participates in PT with improved pain control throughout the shift  10/10/2023 0246 by Violetta La, RN  Outcome: Progressing  10/9/2023 1928 by Violetta La, RN  Outcome: Progressing  10/9/2023 1919 by Violetta La, RN  Outcome: Progressing     Problem: Skin  Goal: Participates in plan/prevention/treatment measures  10/10/2023 0246 by Violetta La, RN  Outcome: Progressing  10/10/2023 0245 by Violetta La, RN  Outcome: Progressing  10/9/2023 1930 by Violetta La, RN  Outcome: Progressing  10/9/2023 1928 by Violetta La, RN  Outcome: Progressing  10/9/2023 1924 by Violetta La RN  Outcome: Progressing  10/9/2023 1919 by  Violetta La, RN  Outcome: Progressing  Goal: Prevent/manage excess moisture  10/10/2023 0246 by Violetta La, RN  Outcome: Progressing  10/10/2023 0245 by Violetta La, RN  Outcome: Progressing  10/9/2023 1930 by Violetta La, RN  Outcome: Progressing  10/9/2023 1928 by Violetta La, RN  Outcome: Progressing  10/9/2023 1924 by Violetta La, RN  Outcome: Progressing  10/9/2023 1919 by Violetta La RN  Outcome: Progressing  Goal: Prevent/minimize sheer/friction injuries  10/10/2023 0246 by Violetta La, RN  Outcome: Progressing  10/10/2023 0245 by Violetta La, RN  Outcome: Progressing  10/9/2023 1930 by Violetta La, RN  Outcome: Progressing  10/9/2023 1928 by Violetta La, RN  Outcome: Progressing  10/9/2023 1924 by Violetta La, RN  Outcome: Progressing  10/9/2023 1919 by Violetta La, RN  Outcome: Progressing  Goal: Promote/optimize nutrition  10/10/2023 0246 by Violetta La, RN  Outcome: Progressing  10/10/2023 0245 by Violetta La, RN  Outcome: Progressing  10/9/2023 1930 by Violetta La, RN  Outcome: Progressing  10/9/2023 1928 by Violetta La, RN  Outcome: Progressing  10/9/2023 1924 by Violetta La, RN  Outcome: Progressing  10/9/2023 1919 by Violetta La, RN  Outcome: Progressing     Problem: Heart Failure  Goal: Improved urinary output this shift  Outcome: Progressing  Goal: Reduction in peripheral edema within 24 hours  Outcome: Progressing  Goal: Report improvement of dyspnea/breathlessness this shift  Outcome: Progressing  Goal: Weight from fluid excess reduced over 2-3 days, then stabilize  Outcome: Progressing  Goal: Increase self care and/or family involvement in 24 hours  Outcome: Progressing     Problem: Pain - Adult  Goal: Verbalizes/displays adequate comfort level or baseline comfort level  Outcome: Progressing     Problem: Safety - Adult  Goal: Free from fall injury  Outcome: Progressing     Problem: Discharge Planning  Goal: Discharge to home or other facility with  appropriate resources  Outcome: Progressing     Problem: Chronic Conditions and Co-morbidities  Goal: Patient's chronic conditions and co-morbidity symptoms are monitored and maintained or improved  Outcome: Progressing       The clinical goals for the shift include Patient will have negative fluid balance t/o shift    Over the shift, the patient did not make progress toward the following goals. Barriers to progression include fatigue. Recommendations to address these barriers include teach back method.

## 2023-10-10 NOTE — PROGRESS NOTES
Transitional Care Coordination Progress Note:  Patient discussed during interdisciplinary rounds.   Team members present: Wesley Jeffers, CORRINE TCC, HVI, Nurse Manager  Plan per Medical/Surgical team: Lasix gtt, Structural consult, chest X-ray, wound care recs, PT/OT, MitraClip 10/19  Payer: [employer]  Status: inpatient  Discharge disposition: Home w resumption of [facility] RN/PT/OT  Potential Barriers: None  ADOD: 10/13 vs 10/14  Patient is active with [facility] RN for wound care. Patient and family agreeable to adding PT/OT back on to services.   Wesley Jeffers RN Transitional Care Coordinator [phone]

## 2023-10-10 NOTE — CARE PLAN
Problem: Balance  Goal: STG - Maintains dynamic sitting balance with upper extremity support  Description: With independence     Outcome: Progressing     Problem: Mobility  Goal: STG - Patient will ambulate  Description: >/= 200 feet with stable vital signs and 2WhW  Outcome: Progressing  Goal: STG - Patient will ascend and descend four to six stairs  Description: With 1 HR and Min Assist +1  Outcome: Progressing     Problem: Transfers  Goal: STG - Transfer from bed to chair  Description: With modified independence and 2WHW  Outcome: Progressing  Goal: STG - Patient will perform bed mobility  Description: With modified independence and 2WHW  Outcome: Progressing  Goal: STG - Patient will transfer sit to and from stand  Description: With modified independence and 2WHW    Outcome: Progressing

## 2023-10-10 NOTE — NURSING NOTE
Notified Dr Martinez of bradycardia while asleep with a HR as low as 33BPM. VS obtained and stable. MD to review tele. No new orders

## 2023-10-10 NOTE — CONSULTS
Consult Reason: evaluation for further work up of thrombocytopenia, and whether to give Aspisin    History Of Present Illness:    Rajendra Chavis is a 88 y.o. male with PMHx of diastolic CHF (EF 50-55%), severe MR & TR, CKD 3, Afib on Eliquis, and R pretibial venous ulcer who presented to [facility] on 10/5/23 with complaints of worsened SOB, fatigue and abdominal/ leg edema. Patient was instructed to report to ED by his cardiologist for evaluation and admission to [facility] for optimization of his heart disease prior to DENIS scheduled on 10/19. P. While awaiting a bed at [facility], patient was started on lasix ggt at rate of 5 on admission at [place]. Transfer to [facility] for further optimization of CHF prior to scheduled DENIS. Patient then developed thrombocytopenia to the 80s.      Patient has a history of thrombocytopenia. 6 months ago patient was in the 90s for platelets which occurred when patient was on zosyn.     ROS: fatigue, confusion     Last Recorded Vitals:  Vitals:    10/10/23 1134   BP: (!) 107/48   Pulse: 54   Resp: 18   Temp: 36.6 °C (97.8 °F)   SpO2: 97%        Last Labs:  Lab Results   Component Value Date    WBC 6.2 10/09/2023    HGB 11.4 (L) 10/09/2023    HCT 37.5 (L) 10/09/2023    MCV 78 (L) 10/09/2023    PLT 79 (L) 10/09/2023        Past Medical History:  Diastolic hf  MR  TR  CKD  A fib on eliquis    Past Surgical History:  Unknown      Social History:  No smoking, alcohol, or drugs    Family History:  No family history on file.     Allergies:  Patient has no known allergies.    Inpatient Medications:  Scheduled medications   Medication Dose Route Frequency    apixaban  2.5 mg oral BID    polyethylene glycol  17 g oral Daily    tamsulosin  0.4 mg oral BID     PRN medications   Medication     Continuous Medications   Medication Dose Last Rate    furosemide  20 mg/hr 20 mg/hr (10/10/23 0629)     Outpatient Medications:  Current Outpatient Medications   Medication Instructions    Eliquis 2.5 mg tablet 1 tablet,  oral, 2 times daily    potassium chloride CR 10 mEq ER tablet 10 mEq, oral, Daily, Do not crush, chew, or split.    tamsulosin (FLOMAX) 0.4 mg, oral, 2 times daily    torsemide (DEMADEX) 40 mg, oral, Daily    vit C,K-Ry-jtbgl-lutein-zeaxan (PreserVision AREDS-2) 250-90-40-1 mg capsule 1 capsule, oral, Daily       Physical Exam:  General: NAD  HEENT: no blood in nares, head is atraumatic , normocephalic  Cardiac- holosystolic murmur on auscultation  Pulm: no increased work of breathing  Abd: no ttp  Skin: diffuse ecchymosis noted on upper extremities  Psych: patient is confused      Assessment/Plan        Rajendra Chavis is a 88 y.o. male with PMHx of diastolic CHF (EF 50-55%), severe MR & TR, CKD 3, Afib on Eliquis, and R pretibial venous ulcer who presented to [facility] on 10/5/23 with complaints of worsened SOB, fatigue and abdominal/ leg edema. Patient was instructed to report to ED by his cardiologist for evaluation and admission to [facility] for optimization of his heart disease prior to DENIS scheduled on 10/19. While awaiting a bed at [facility], patient was started on lasix ggt at rate of 5 on admission at [place]. Transfer to [facility] for further optimization of CHF prior to scheduled DENIS. Patient then developed thrombocytopenia to the 80s.      Patient has a history of thrombocytopenia. 6 months ago patient was in the 90s for platelets which occurred when patient was on zosyn.     Smear: marked anisocytosis, not many schistocytes, some teardrops, some large platelets-although few in number overall, target cells, decreased cytoplasm noted in neutrophils.     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 Although further workup is needed, in order to rule out other etiologies.     Recommendations:   -B12/ folate  -hapto, retic, LDH  -HIV, hep B  -SPEP and free light chains  -iron studies, (ferritin and TIBC, Tsat)  -LFTs  -coags  -fibrinogen  -retics  -aspirin is not contraindicated as platelets are above 50K  Patient seen and  discussed with Attending Fox Tomlin  Med Student  Heme Onc

## 2023-10-10 NOTE — CARE PLAN
The patient's goals for the shift include decreased DEJESUS    The clinical goals for the shift include negative fluid balance    Over the shift, the patient did not make progress toward the following goals. Barriers to progression include forgetfulness. Recommendations to address these barriers include reeducation with patient teach back  Problem: Safety  Goal: Patient will be injury free during hospitalization  Outcome: Progressing  Goal: I will remain free of falls  Outcome: Progressing     Problem: Daily Care  Goal: Daily care needs are met  Outcome: Progressing     Problem: Psychosocial Needs  Goal: Demonstrates ability to cope with hospitalization/illness  Outcome: Progressing  Goal: Collaborate with me, my family, and caregiver to identify my specific goals  Outcome: Progressing     Problem: Discharge Barriers  Goal: My discharge needs are met  Outcome: Progressing     Problem: Fall/Injury  Goal: Not fall by end of shift  Outcome: Progressing  Goal: Be free from injury by end of the shift  Outcome: Progressing  Goal: Verbalize understanding of personal risk factors for fall in the hospital  Outcome: Progressing     Problem: Pain  Goal: Takes deep breaths with improved pain control throughout the shift  Outcome: Progressing  Goal: Turns in bed with improved pain control throughout the shift  Outcome: Progressing  Goal: Walks with improved pain control throughout the shift  Outcome: Progressing  Goal: Performs ADL's with improved pain control throughout shift  Outcome: Progressing  Goal: Participates in PT with improved pain control throughout the shift  Outcome: Progressing     Problem: Skin  Goal: Participates in plan/prevention/treatment measures  Outcome: Progressing  Goal: Prevent/manage excess moisture  Outcome: Progressing  Goal: Prevent/minimize sheer/friction injuries  Outcome: Progressing  Goal: Promote/optimize nutrition  Outcome: Progressing     Problem: Heart Failure  Goal: Improved urinary output this  shift  Outcome: Progressing  Goal: Reduction in peripheral edema within 24 hours  Outcome: Progressing  Goal: Report improvement of dyspnea/breathlessness this shift  Outcome: Progressing  Goal: Weight from fluid excess reduced over 2-3 days, then stabilize  Outcome: Progressing  Goal: Increase self care and/or family involvement in 24 hours  Outcome: Progressing     Problem: Pain - Adult  Goal: Verbalizes/displays adequate comfort level or baseline comfort level  Outcome: Progressing     Problem: Safety - Adult  Goal: Free from fall injury  Outcome: Progressing     Problem: Discharge Planning  Goal: Discharge to home or other facility with appropriate resources  Outcome: Progressing     Problem: Chronic Conditions and Co-morbidities  Goal: Patient's chronic conditions and co-morbidity symptoms are monitored and maintained or improved  Outcome: Progressing   .

## 2023-10-10 NOTE — CONSULTS
Consults    History Of Present Illness:    88 y.o. male with PMHx of diastolic CHF (EF 50-55%), severe MR & TR, CKD 3, Afib on Eliquis, and R pretibial venous ulcer who presented to [facility] on 10/5/23 with complaints of worsened SOB, fatigue and abdominal/ leg edema. Patient was instructed to report to ED by his cardiologist for evaluation and admission to [facility] for optimization of his heart disease prior to DENIS scheduled on 10/19. Patient follows with Godfrey Mack, Jessy and Ramicone. Last echo in 5/2023 with EF 55%, severe MR & TR, mild AR. While awaiting a bed at [facility], patient was started on lasix ggt at rate of 5 on admission at [place]. Transfer to [facility] for further optimization of CHF prior to scheduled DENIS.       Patient reports continued SOB with exertion, denying SOB at rest. He notes that his LE edema has been persistent. He denies any chest pain, palpitation, abdominal pain, fever, chills, or malaise.      He has noticed gradual and persistent worsening LE swelling for quite some time now, not noticing improvement with his daily torsemide.  He is independent and was just driving and going to the grocery store a few days ago without limitation.      DENIS workup     NYHA: Class 2  Frailty score: 2/5     EKG: NSR:  Gianna: 138 QRS: 86    EE 5/8/23: EF 55%, LA moderately dilated, RA severely dilated, severe MR, severe TR, small PFO (10 bubbles), plaque visualized in descending & transverse aorta    - L/RHC 5/08/2023: No angiographically significant CAD in right-dominant circulation. Elevated filling/pulmonary pressures.    Dental assessment: no significant signs of infection  STS Score:  Risk of Mortality:  7.217%  Renal Failure:  4.150%  Permanent Stroke:  1.960%  Prolonged Ventilation:  12.532%  DSW Infection:  0.084%  Reoperation:  5.675%  Morbidity or Mortality:  20.772%  Short Length of Stay:  13.503%  Long Length of Stay:  14.729%    Past Medical History:  He has a past medical history of Acute on  chronic combined systolic and diastolic heart failure (CMS/HCC) (10/06/2023), Anxiety and depression (10/05/2023), Aortic valve disorder (03/21/2023), Atrial fibrillation (CMS/HCC) (03/29/2023), Benign prostate hyperplasia (10/05/2023), CHF (congestive heart failure) (CMS/[facility]), Edema of extremities (10/07/2023), Hypertensive heart disease with heart failure (CMS/[facility]) (03/29/2023), Incarcerated right inguinal hernia (10/07/2023), Irregular heartbeat (10/07/2023), Mitral valve disease (03/21/2023), Numerous skin moles (10/07/2023), Onychomycosis due to dermatophyte (10/07/2023), Pain in both feet (10/07/2023), Peripheral edema (10/07/2023), Personal history of other medical treatment, Right inguinal hernia (10/07/2023), Unspecified diastolic (congestive) heart failure (CMS/[facility]) (03/29/2023), Venous congestion (10/07/2023), Wounds, multiple open, lower extremity, unspecified laterality, sequela (10/07/2023), and Xerosis cutis (10/07/2023).    Past Surgical History:  He has a past surgical history that includes Other surgical history (09/16/2021).      Social History:  He reports that he has quit smoking. His smoking use included cigarettes. He has never used smokeless tobacco. No history on file for alcohol use and drug use.    Family History:  No family history on file.     Allergies:  Patient has no known allergies.    Inpatient Medications:  Scheduled medications   Medication Dose Route Frequency    apixaban  2.5 mg oral BID    polyethylene glycol  17 g oral Daily    tamsulosin  0.4 mg oral BID     PRN medications   Medication     Continuous Medications   Medication Dose Last Rate    furosemide  20 mg/hr 20 mg/hr (10/10/23 0629)     Outpatient Medications:  Current Outpatient Medications   Medication Instructions    Eliquis 2.5 mg tablet 1 tablet, oral, 2 times daily    potassium chloride CR 10 mEq ER tablet 10 mEq, oral, Daily, Do not crush, chew, or split.    tamsulosin (FLOMAX) 0.4 mg, oral, 2 times daily     torsemide (DEMADEX) 40 mg, oral, Daily    vit C,X-Ca-tucud-lutein-zeaxan (PreserVision AREDS-2) 250-90-40-1 mg capsule 1 capsule, oral, Daily       Physical Exam:  AO x 3  CVS- normal s1, s1, + systolic murmur  Resp -Crackles b/l bases   Abd- Soft, NT, ND  Neuro  No gross motor. Sensory deficits   + 3 LE edema LLE, 2+ RLE             Assessment/Plan   Rajendra Chavis is a 88 y.o. male with PMHx of diastolic CHF (EF 50-55%), severe MR & TR, CKD 3, Afib on Eliquis, and R pretibial venous ulcer who presented to [facility] on 10/5/23 with complaints of worsened SOB, fatigue and abdominal/ leg edema. Transfer to [facility] for further optimization of CHF prior to scheduled DENIS on 10/19 with Dr. Orellana.      Acute on chronic diastolic heart failure  Severe MR & TR  - HAFSA 5/8/23: EF 55%, LA moderately dilated, RA severely dilated, severe MR, severe TR, small PFO (10 bubbles), plaque visualized in descending & transverse aorta  - L/RHC 5/08/2023: No angiographically significant CAD in right-dominant circulation. Elevated filling/pulmonary pressures. Preserved CO/CI by assumed Francisco method.  - admit BNP on 10/5: 508 (previously 570 in 6/2023)  - Continue fluid optimization with diuresis   -10/19 scheduled for MitraClip +/- tricuspid clip   - goal discharge by 10/14 for OP Clip or earlier if optimized sooner than 10/14      Rod Rosenthal MD

## 2023-10-10 NOTE — PROGRESS NOTES
Physical Therapy    Physical Therapy Evaluation & Treatment    Patient Name: Rajendra Chavis  MRN: [id number]  Today's Date: 10/10/2023   Time Calculation  Start Time: 0944  Stop Time: 1032  Time Calculation (min): 48 min    Assessment/Plan   PT Assessment  PT Assessment Results: Decreased strength, Decreased range of motion, Decreased endurance, Impaired balance, Decreased mobility  Rehab Prognosis: Good  Evaluation/Treatment Tolerance: Patient tolerated treatment well (blunted BP response)  Medical Staff Made Aware: Yes  Strengths: Housing layout, Support of Caregivers  End of Session Communication: Bedside nurse  Assessment Comment: 87 y/o male admitted to the hospital with increased fatigue, SOB, and increased LE edema for otimzation priot to DENIS (10/19), pt presents to PT with impaired mobility, endurance, gait, and stair negotition and would benefit from contined in house PT and high intensity PT at discharge to progression to PLOF and decrease risk for falls. Pt motivated to participte and with good family support.  End of Session Patient Position: Up in chair, Alarm on  IP OR SWING BED PT PLAN  Inpatient or Swing Bed: Inpatient  PT Plan  Treatment/Interventions: Bed mobility, Transfer training, Gait training, Stair training, Balance training, Neuromuscular re-education, Endurance training, Range of motion, Therapeutic exercise, Therapeutic activity, Home exercise program, Postural re-education  PT Plan: Skilled PT  PT Frequency: 4 times per week  PT Discharge Recommendations: High intensity level of continued care  PT Recommended Transfer Status: Assist x2 (when getting back to bed, use of 2WhW; 1 person assist to get from bed to chair)      Subjective     General Visit Information:  General  Reason for Referral: impaired mobility, impaired gait  Referred By: Neil Williamson and Dr. Boateng  Past Medical History Relevant to Rehab: 87 y/o male admitted for optimization prior to planned DENIS on 10/19 (pt with  "increased SOB, faitgue, abdominal and leg edema) PMH: diastolic CHF (EF 50-55%), severe MR & TR, CKD 3, Afib on Eliquis, and R pretibial venous ulcer, anxiety, depression, BPH, past smoker  Family/Caregiver Present: No  Prior to Session Communication: Bedside nurse (Neil)  Patient Position Received: Bed, 3 rail up, Alarm on  General Comment: Pt agreeable to PT eval  Home Living:  Home Living  Type of Home: House  Lives With: Spouse  Home Adaptive Equipment: Walker rolling or standard (grab bars in tub shower)  Home Layout: One level (2-3 JENNI with 1 hand rail)  Home Access: Stairs to enter with rails  Entrance Stairs-Number of Steps: 2-3  Prior Level of Function:  Prior Function Per Pt/Caregiver Report  Level of [place]:  (wife assists with cooking, cleaning, pt independent with bathing, dressing, tolieting)  Receives Help From: Family  Ambulatory Assistance: Independent (able to ambulate community distances, using a walker in the home \"beacuse my wife would like me to,\" denies history of falls)  Prior Function Comments:  (+ dirver)  Precautions:  Precautions  Medical Precautions: Cardiac precautions, Fall precautions  Vital Signs:  Vital Signs  Heart Rate:  (pre 41-46, during 44-70, post 48-52 (per RN, HR runs in the 40's at baseline))  Heart Rate Source: Monitor  SpO2:  (pre 96%, during 94-98%, post 98% on room air)  BP:  (pre 104/58 (supine), during 107/59 (seated EOB), during 105/60 (post marching), post 99/55 (seated up in chair))  BP Location: Left arm  BP Method: Automatic    Objective   Pain:  Pain Assessment  Pain Assessment: 0-10  Pain Score: 0 - No pain  Cognition:  Cognition  Overall Cognitive Status: Impaired  Orientation Level: Disoriented to person (and month and situation (admitted for LE edema), re-oriented to day and directed to the white board, re-oriented to location (he knew he was at the hospial, but not which one))  Attention: Within Functional Limits  Processing Speed:  (increased time " for verbal directions)    General Assessments:    Activity Tolerance  Endurance: Tolerates 30 min exercise with multiple rests      Strength  Strength Comments:  ((B) UE at least 3/5 tested functionally, (B) LE at least 3/5 tested functioally)      Static Sitting Balance  Static Sitting-Balance Support: No upper extremity supported  Static Sitting-Level of Assistance: Close supervision  Dynamic Sitting Balance  Dynamic Sitting-Balance Support: Bilateral upper extremity supported  Dynamic Sitting-Comments: SBA    Static Standing Balance  Static Standing-Balance Support: Bilateral upper extremity supported  Static Standing-Level of Assistance: Contact guard  Static Standing-Comment/Number of Minutes:  (Assist at turnk)  Dynamic Standing Balance  Dynamic Standing-Balance Support: Bilateral upper extremity supported  Dynamic Standing-Balance:  (marching)  Dynamic Standing-Comments:  (CGA to Min assist at trunk)  Functional Assessments:    Bed Mobility  Bed Mobility: Yes  Bed Mobility 1  Bed Mobility 1: Supine to sitting, Scooting  Level of Assistance 1: Minimum assistance  Bed Mobility Comments 1: HOB elevated    Transfers  Transfer: Yes  Transfer 1  Transfer From 1:  (sit <> stand)  Transfer Device 1: Gait belt (2WHW)  Transfer Level of Assistance 1: Minimum assistance  Trials/Comments 1: from a raised surface  Transfers 2  Transfer to 2:  (bed to chair)  Technique 2:  (stand pivot with steps)  Transfer Level of Assistance 2: Minimal verbal cues  Trials/Comments 2: increased time to complete    Ambulation/Gait Training  Ambulation/Gait Training Performed: Yes  Ambulation/Gait Training 1  Surface 1: Level tile  Device 1: Rolling walker (gait belt)  Assistance 1: Minimum assistance  Comments/Distance (ft) 1:  (marching in place x 10 reps)    Stairs  Stairs: No    Treatments:  Therapeutic Exercise  Therapeutic Exercise Performed: Yes  Therapeutic Exercise Activity 1: Scapular retractions x 8 reps, shoulder circles, x 5  reps, LAQ's x 5 reps      Therapeutic Activity  Therapeutic Activity Performed: Yes  Therapeutic Activity 1: Static standing x 2 minutes during self care activity; Marchign in place x 60 seconds, v/c for paired breathing technique      Bed Mobility  Bed Mobility: Yes  Bed Mobility 1  Bed Mobility 1: Supine to sitting, Scooting  Level of Assistance 1: Minimum assistance  Bed Mobility Comments 1: HOB elevated    Ambulation/Gait Training  Ambulation/Gait Training Performed: Yes  Ambulation/Gait Training 1  Surface 1: Level tile  Device 1: Rolling walker (gait belt)  Assistance 1: Minimum assistance  Comments/Distance (ft) 1:  (marching in place x 10 reps)  Transfers  Transfer: Yes  Transfer 1  Transfer From 1:  (sit <> stand)  Transfer Device 1: Gait belt (2WHW)  Transfer Level of Assistance 1: Minimum assistance  Trials/Comments 1: from a raised surface  Transfers 2  Transfer to 2:  (bed to chair)  Technique 2:  (stand pivot with steps)  Transfer Level of Assistance 2: Minimal verbal cues  Trials/Comments 2: increased time to complete    Stairs  Stairs: No    me Measures:    [facility] Basic Mobility  Turning from your back to your side while in a flat bed without using bedrails: A little  Moving from lying on your back to sitting on the side of a flat bed without using bedrails: A little  Moving to and from bed to chair (including a wheelchair): A little  Standing up from a chair using your arms (e.g. wheelchair or bedside chair): A little  To walk in hospital room: A lot  Climbing 3-5 steps with railing: Total  Basic Mobility - Total Score: 15                                                                   Encounter Problems       Encounter Problems (Active)       Balance       STG - Maintains dynamic sitting balance with upper extremity support (Progressing)       Start:  10/10/23    Expected End:  10/24/23       With independence               Mobility       STG - Patient will ambulate (Progressing)       Start:   10/10/23    Expected End:  10/24/23       >/= 200 feet with stable vital signs and 2WhW         STG - Patient will ascend and descend four to six stairs (Progressing)       Start:  10/10/23    Expected End:  10/24/23       With 1 HR and Min Assist +1            Pain - Adult          Transfers       STG - Transfer from bed to chair (Progressing)       Start:  10/10/23    Expected End:  10/24/23       With modified independence and 2WHW         STG - Patient will perform bed mobility (Progressing)       Start:  10/10/23    Expected End:  10/24/23       With modified independence and 2WHW         STG - Patient will transfer sit to and from stand (Progressing)       Start:  10/10/23    Expected End:  10/24/23       With modified independence and 2WHW                  Education Documentation  Precautions, taught by Roxanne Morales, PT at 10/10/2023  2:12 PM.  Learner: Patient  Readiness: Acceptance  Method: Explanation, Demonstration  Response: Verbalizes Understanding, Needs Reinforcement  Comment: educated on importance of calling for assist prior to getting up to decrease fall risk    Mobility Training, taught by Roxanne Morales, PT at 10/10/2023  2:12 PM.  Learner: Patient  Readiness: Acceptance  Method: Explanation, Demonstration  Response: Verbalizes Understanding, Needs Reinforcement  Comment: educated on importance of calling for assist prior to getting up to decrease fall risk    Education Comments  No comments found.

## 2023-10-11 LAB
ALBUMIN SERPL BCP-MCNC: 2.9 G/DL (ref 3.4–5)
ALBUMIN SERPL BCP-MCNC: 3.2 G/DL (ref 3.4–5)
ALP SERPL-CCNC: 108 U/L (ref 33–136)
ALT SERPL W P-5'-P-CCNC: 20 U/L (ref 10–52)
ANION GAP SERPL CALC-SCNC: 15 MMOL/L (ref 10–20)
ANION GAP SERPL CALC-SCNC: 17 MMOL/L (ref 10–20)
APTT PPP: 38 SECONDS (ref 27–38)
AST SERPL W P-5'-P-CCNC: 33 U/L (ref 9–39)
BILIRUB SERPL-MCNC: 1.9 MG/DL (ref 0–1.2)
BUN SERPL-MCNC: 62 MG/DL (ref 6–23)
BUN SERPL-MCNC: 63 MG/DL (ref 6–23)
CALCIUM SERPL-MCNC: 9 MG/DL (ref 8.6–10.6)
CALCIUM SERPL-MCNC: 9.1 MG/DL (ref 8.6–10.6)
CHLORIDE SERPL-SCNC: 100 MMOL/L (ref 98–107)
CHLORIDE SERPL-SCNC: 95 MMOL/L (ref 98–107)
CO2 SERPL-SCNC: 26 MMOL/L (ref 21–32)
CO2 SERPL-SCNC: 27 MMOL/L (ref 21–32)
CREAT SERPL-MCNC: 1.49 MG/DL (ref 0.5–1.3)
CREAT SERPL-MCNC: 1.49 MG/DL (ref 0.5–1.3)
ERYTHROCYTE [DISTWIDTH] IN BLOOD BY AUTOMATED COUNT: 22 % (ref 11.5–14.5)
ERYTHROCYTE [DISTWIDTH] IN BLOOD BY AUTOMATED COUNT: 22 % (ref 11.5–14.5)
FERRITIN SERPL-MCNC: 65 NG/ML (ref 20–300)
FIBRINOGEN PPP-MCNC: 158 MG/DL (ref 200–400)
FOLATE SERPL-MCNC: 18.2 NG/ML
GFR SERPL CREATININE-BSD FRML MDRD: 45 ML/MIN/1.73M*2
GFR SERPL CREATININE-BSD FRML MDRD: 45 ML/MIN/1.73M*2
GLUCOSE SERPL-MCNC: 108 MG/DL (ref 74–99)
GLUCOSE SERPL-MCNC: 176 MG/DL (ref 74–99)
HAPTOGLOB SERPL NEPH-MCNC: <30 MG/DL (ref 30–200)
HBV CORE IGM SER QL: NONREACTIVE
HCT VFR BLD AUTO: 38.2 % (ref 41–52)
HCT VFR BLD AUTO: 38.7 % (ref 41–52)
HGB BLD-MCNC: 11.5 G/DL (ref 13.5–17.5)
HGB BLD-MCNC: 11.9 G/DL (ref 13.5–17.5)
HGB RETIC QN: 23 PG (ref 28–38)
IMMATURE RETIC FRACTION: 18.7 %
INR PPP: 2.2 (ref 0.9–1.1)
IRON SATN MFR SERPL: 13 % (ref 25–45)
IRON SERPL-MCNC: 49 UG/DL (ref 35–150)
LDH SERPL L TO P-CCNC: 201 U/L (ref 84–246)
MCH RBC QN AUTO: 23.1 PG (ref 26–34)
MCH RBC QN AUTO: 23.2 PG (ref 26–34)
MCHC RBC AUTO-ENTMCNC: 30.1 G/DL (ref 32–36)
MCHC RBC AUTO-ENTMCNC: 30.7 G/DL (ref 32–36)
MCV RBC AUTO: 76 FL (ref 80–100)
MCV RBC AUTO: 77 FL (ref 80–100)
NRBC BLD-RTO: 0 /100 WBCS (ref 0–0)
NRBC BLD-RTO: 0 /100 WBCS (ref 0–0)
PHOSPHATE SERPL-MCNC: 4 MG/DL (ref 2.5–4.9)
PLATELET # BLD AUTO: 62 X10*3/UL (ref 150–450)
PLATELET # BLD AUTO: 77 X10*3/UL (ref 150–450)
PMV BLD AUTO: ABNORMAL FL
PMV BLD AUTO: ABNORMAL FL
POTASSIUM SERPL-SCNC: 3.6 MMOL/L (ref 3.5–5.3)
POTASSIUM SERPL-SCNC: 3.6 MMOL/L (ref 3.5–5.3)
PROT SERPL-MCNC: 6.9 G/DL (ref 6.4–8.2)
PROTHROMBIN TIME: 24.9 SECONDS (ref 9.8–12.8)
RBC # BLD AUTO: 4.98 X10*6/UL (ref 4.5–5.9)
RBC # BLD AUTO: 5.12 X10*6/UL (ref 4.5–5.9)
RETICS #: 0.09 X10*6/UL (ref 0.02–0.11)
RETICS/RBC NFR AUTO: 1.7 % (ref 0.5–2)
SODIUM SERPL-SCNC: 133 MMOL/L (ref 136–145)
SODIUM SERPL-SCNC: 139 MMOL/L (ref 136–145)
TIBC SERPL-MCNC: 366 UG/DL (ref 240–445)
UIBC SERPL-MCNC: 317 UG/DL (ref 110–370)
VIT B12 SERPL-MCNC: >2000 PG/ML (ref 211–911)
WBC # BLD AUTO: 6.7 X10*3/UL (ref 4.4–11.3)
WBC # BLD AUTO: 9.7 X10*3/UL (ref 4.4–11.3)

## 2023-10-11 PROCEDURE — 85027 COMPLETE CBC AUTOMATED: CPT

## 2023-10-11 PROCEDURE — 86705 HEP B CORE ANTIBODY IGM: CPT

## 2023-10-11 PROCEDURE — 99232 SBSQ HOSP IP/OBS MODERATE 35: CPT | Performed by: INTERNAL MEDICINE

## 2023-10-11 PROCEDURE — 83010 ASSAY OF HAPTOGLOBIN QUANT: CPT

## 2023-10-11 PROCEDURE — 84155 ASSAY OF PROTEIN SERUM: CPT

## 2023-10-11 PROCEDURE — 82728 ASSAY OF FERRITIN: CPT

## 2023-10-11 PROCEDURE — 86334 IMMUNOFIX E-PHORESIS SERUM: CPT

## 2023-10-11 PROCEDURE — 86320 SERUM IMMUNOELECTROPHORESIS: CPT

## 2023-10-11 PROCEDURE — 36415 COLL VENOUS BLD VENIPUNCTURE: CPT

## 2023-10-11 PROCEDURE — 85384 FIBRINOGEN ACTIVITY: CPT

## 2023-10-11 PROCEDURE — 85730 THROMBOPLASTIN TIME PARTIAL: CPT

## 2023-10-11 PROCEDURE — 2500000001 HC RX 250 WO HCPCS SELF ADMINISTERED DRUGS (ALT 637 FOR MEDICARE OP)

## 2023-10-11 PROCEDURE — 2500000004 HC RX 250 GENERAL PHARMACY W/ HCPCS (ALT 636 FOR OP/ED): Performed by: NURSE PRACTITIONER

## 2023-10-11 PROCEDURE — 83521 IG LIGHT CHAINS FREE EACH: CPT | Mod: CMCLAB

## 2023-10-11 PROCEDURE — 82746 ASSAY OF FOLIC ACID SERUM: CPT

## 2023-10-11 PROCEDURE — 1200000002 HC GENERAL ROOM WITH TELEMETRY DAILY

## 2023-10-11 PROCEDURE — 2500000004 HC RX 250 GENERAL PHARMACY W/ HCPCS (ALT 636 FOR OP/ED)

## 2023-10-11 PROCEDURE — 83550 IRON BINDING TEST: CPT

## 2023-10-11 PROCEDURE — 87536 HIV-1 QUANT&REVRSE TRNSCRPJ: CPT | Mod: CMCLAB

## 2023-10-11 PROCEDURE — 84165 PROTEIN E-PHORESIS SERUM: CPT

## 2023-10-11 PROCEDURE — 80053 COMPREHEN METABOLIC PANEL: CPT

## 2023-10-11 PROCEDURE — 82607 VITAMIN B-12: CPT

## 2023-10-11 RX ORDER — POTASSIUM CHLORIDE 20 MEQ/1
40 TABLET, EXTENDED RELEASE ORAL ONCE
Status: COMPLETED | OUTPATIENT
Start: 2023-10-11 | End: 2023-10-11

## 2023-10-11 RX ORDER — POTASSIUM CHLORIDE 20 MEQ/1
40 TABLET, EXTENDED RELEASE ORAL ONCE
Status: COMPLETED | OUTPATIENT
Start: 2023-10-11 | End: 2023-10-12

## 2023-10-11 RX ORDER — FUROSEMIDE 10 MG/ML
80 INJECTION INTRAMUSCULAR; INTRAVENOUS ONCE
Status: COMPLETED | OUTPATIENT
Start: 2023-10-11 | End: 2023-10-11

## 2023-10-11 RX ORDER — METOLAZONE 5 MG/1
2.5 TABLET ORAL ONCE
Status: COMPLETED | OUTPATIENT
Start: 2023-10-11 | End: 2023-10-11

## 2023-10-11 RX ADMIN — FUROSEMIDE 20 MG/HR: 10 INJECTION, SOLUTION INTRAMUSCULAR; INTRAVENOUS at 14:44

## 2023-10-11 RX ADMIN — FUROSEMIDE 80 MG: 10 INJECTION, SOLUTION INTRAMUSCULAR; INTRAVENOUS at 10:24

## 2023-10-11 RX ADMIN — APIXABAN 2.5 MG: 2.5 TABLET, FILM COATED ORAL at 20:14

## 2023-10-11 RX ADMIN — POTASSIUM CHLORIDE 40 MEQ: 1500 TABLET, EXTENDED RELEASE ORAL at 10:24

## 2023-10-11 RX ADMIN — APIXABAN 2.5 MG: 2.5 TABLET, FILM COATED ORAL at 08:20

## 2023-10-11 RX ADMIN — METOLAZONE 2.5 MG: 5 TABLET ORAL at 09:52

## 2023-10-11 RX ADMIN — TAMSULOSIN HYDROCHLORIDE 0.4 MG: 0.4 CAPSULE ORAL at 08:20

## 2023-10-11 RX ADMIN — TAMSULOSIN HYDROCHLORIDE 0.4 MG: 0.4 CAPSULE ORAL at 20:14

## 2023-10-11 ASSESSMENT — COGNITIVE AND FUNCTIONAL STATUS - GENERAL
WALKING IN HOSPITAL ROOM: A LOT
MOBILITY SCORE: 16
MOVING TO AND FROM BED TO CHAIR: A LITTLE
MOVING FROM LYING ON BACK TO SITTING ON SIDE OF FLAT BED WITH BEDRAILS: A LITTLE
PERSONAL GROOMING: A LITTLE
EATING MEALS: A LITTLE
DRESSING REGULAR UPPER BODY CLOTHING: A LITTLE
TURNING FROM BACK TO SIDE WHILE IN FLAT BAD: A LITTLE
HELP NEEDED FOR BATHING: A LITTLE
STANDING UP FROM CHAIR USING ARMS: A LITTLE
TOILETING: A LITTLE
DRESSING REGULAR LOWER BODY CLOTHING: A LITTLE
CLIMB 3 TO 5 STEPS WITH RAILING: A LOT
DAILY ACTIVITIY SCORE: 18

## 2023-10-11 ASSESSMENT — PAIN - FUNCTIONAL ASSESSMENT
PAIN_FUNCTIONAL_ASSESSMENT: 0-10
PAIN_FUNCTIONAL_ASSESSMENT: 0-10

## 2023-10-11 ASSESSMENT — PAIN SCALES - GENERAL
PAINLEVEL_OUTOF10: 0 - NO PAIN
PAINLEVEL_OUTOF10: 0 - NO PAIN

## 2023-10-11 NOTE — PROGRESS NOTES
Subjective Data:  Patient had decreased UO over last 24 hrs and remains hypervolemic to level of upper thighs. SH would prefer patient be fully optimized prior to discharge.     Patient denies SOB or chest pain.    - Heme consulted for thrombocytopenia -- requested labs ordered and pending  - stable Cr with nearly uncharged wt.  - rebolus 80mg IV lasix + metolazone 2.5mg + cont gtt at 20mg/hr   - wound care orders placed     Overnight Events:    No acute events overnight     Objective Data:  Last Recorded Vitals:  Vitals:    10/10/23 2326 10/11/23 0304 10/11/23 0809 10/11/23 1144   BP: 115/58 113/60 105/66 102/55   BP Location:    Left arm   Patient Position:    Lying   Pulse: 56 51 51 55   Resp: 18 16 16 16   Temp: 35.7 °C (96.2 °F) 35.4 °C (95.8 °F) 35.6 °C (96 °F) 36.3 °C (97.4 °F)   TempSrc: Temporal Temporal Temporal Temporal   SpO2: 97% 95% 94% 98%   Weight:  89.6 kg (197 lb 8.5 oz)     Height:           Last Labs:  CBC - 10/10/2023:  7:26 PM  6.7 11.5 77    38.2      CMP - 10/10/2023:  7:26 PM  9.0 6.6 24 --- 1.3   4.0 2.9 15 102      PTT - 10/5/2023:  4:51 PM  2.3   26.1 43     TROPHS   Date/Time Value Ref Range Status   10/06/2023 10:43 PM 21 0 - 20 ng/L Final   10/05/2023 04:51 PM 22 0 - 20 ng/L Final   03/21/2023 05:30 AM 25 0 - 20 ng/L Final     Comment:     .  Less than 99th percentile of normal range cutoff-  Female and children under 18 years old <14 ng/L; Male <21 ng/L: Negative  Repeat testing should be performed if clinically indicated.   .  Female and children under 18 years old 14-50 ng/L; Male 21-50 ng/L:  Consistent with possible cardiac damage and possible increased clinical   risk. Serial measurements may help to assess extent of myocardial damage.   .  >50 ng/L: Consistent with cardiac damage, increased clinical risk and  myocardial infarction. Serial measurements may help assess extent of   myocardial damage.   .   NOTE: Children less than 1 year old may have higher baseline troponin    levels and results should be interpreted in conjunction with the overall   clinical context.   .  NOTE: Troponin I testing is performed using a different   testing methodology at [facility] than at other   system hospitals. Direct result comparisons should only   be made within the same method.     03/20/2023 02:05 PM 23 0 - 20 ng/L Final     Comment:     .  Less than 99th percentile of normal range cutoff-  Female and children under 18 years old <14 ng/L; Male <21 ng/L: Negative  Repeat testing should be performed if clinically indicated.   .  Female and children under 18 years old 14-50 ng/L; Male 21-50 ng/L:  Consistent with possible cardiac damage and possible increased clinical   risk. Serial measurements may help to assess extent of myocardial damage.   .  >50 ng/L: Consistent with cardiac damage, increased clinical risk and  myocardial infarction. Serial measurements may help assess extent of   myocardial damage.   .   NOTE: Children less than 1 year old may have higher baseline troponin   levels and results should be interpreted in conjunction with the overall   clinical context.   .  NOTE: Troponin I testing is performed using a different   testing methodology at [facility] than at other   system hospitals. Direct result comparisons should only   be made within the same method.       BNP   Date/Time Value Ref Range Status   10/05/2023 04:51  0 - 99 pg/mL Final   06/20/2023 09:55  0 - 99 pg/mL Final     Comment:     .  <100 pg/mL - Heart failure unlikely  100-299 pg/mL - Intermediate probability of acute heart  .               failure exacerbation. Correlate with clinical  .               context and patient history.    >=300 pg/mL - Heart Failure likely. Correlate with clinical  .               context and patient history.   Biotin interference may cause falsely decreased results.   Patients taking a Biotin dose of up to 5 mg/day should   refrain from taking Biotin  for 24 hours before sample   collection. Providers may contact their local laboratory   for further information.     04/26/2023 09:14  0 - 99 pg/mL Final     Comment:     .  <100 pg/mL - Heart failure unlikely  100-299 pg/mL - Intermediate probability of acute heart  .               failure exacerbation. Correlate with clinical  .               context and patient history.    >=300 pg/mL - Heart Failure likely. Correlate with clinical  .               context and patient history.   Biotin interference may cause falsely decreased results.   Patients taking a Biotin dose of up to 5 mg/day should   refrain from taking Biotin for 24 hours before sample   collection. Providers may contact their local laboratory   for further information.       HGBA1C   Date/Time Value Ref Range Status   03/21/2023 05:30 AM 6.6 % Final     Comment:          Diagnosis of Diabetes-Adults   Non-Diabetic: < or = 5.6%   Increased risk for developing diabetes: 5.7-6.4%   Diagnostic of diabetes: > or = 6.5%  .       Monitoring of Diabetes                Age (y)     Therapeutic Goal (%)   Adults:          >18           <7.0   Pediatrics:    13-18           <7.5                   7-12           <8.0                   0- 6            7.5-8.5   American Diabetes Association. Diabetes Care 33(S1), Jan 2010.       VLDL   Date/Time Value Ref Range Status   03/21/2023 05:30 AM 8 0 - 40 mg/dL Final      Last I/O:  I/O last 3 completed shifts:  In: 807.9 (9 mL/kg) [P.O.:720; I.V.:87.9 (1 mL/kg)]  Out: 2500 (27.9 mL/kg) [Urine:2500 (0.8 mL/kg/hr)]  Weight: 89.6 kg     Past Cardiology Tests (Last 3 Years):    Echo: HAFSA 5/8/2023  1. Left ventricular systolic function is normal with a 55% estimated ejection fraction.  2. There is mildly reduced right ventricular systolic function.  3. The left atrium is moderately dilated.  4. The right atrium is severely dilated.  5. Severe mitral valve regurgitation.  6. Severe tricuspid regurgitation visualized.  7.  Mild aortic valve regurgitation.  8. No left atrial mass.  9. No left atrial thrombus.  10. A bubble study using agitated saline was performed. Bubble study is positive. A small PFO (= 10 bubbles) was demonstrated.  11. There is plaque visualized in the descending aorta.  12. There is plaque visualized in the transverse aorta.    Cath: R/C 5/8/2023  1. 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  2. Elevated filling/pulmonary pressures.  3. Preserved CO/CI by assumed Francisco method.      Inpatient Medications:  Scheduled medications   Medication Dose Route Frequency    apixaban  2.5 mg oral BID    polyethylene glycol  17 g oral Daily    tamsulosin  0.4 mg oral BID     PRN medications   Medication     Continuous Medications   Medication Dose Last Rate    furosemide  20 mg/hr 20 mg/hr (10/10/23 2041)       Physical Exam:  General: sitting in chair, NAD  Skin: warm and dry. Dressing over anterior R pretibial venous ulcer. Venous skin changes present on bilateral LE    Head/neck: JVD to mid neck at 90 degrees  Cardiac: S1S2, bradycardic, +systolic murmur   Pulm: bibasilar rhonchi  GI: soft, nontender   Extremities: 2-3+ bilat LE pitting edema to thighs   Neuro: mumbling, no focal neuro deficits   Psych: appropriate mood and behavior       Assessment/Plan     Acute on chronic diastolic heart failure  Severe MR & TR  - follows with Dr. Mack  - HAFSA 5/8/23 EF 55%, LA moderately dilated, RA severely dilated, severe MR, severe TR, small PFO (10 bubbles), plaque visualized in descending & transverse aorta  - L/RHC 5/08/2023 No angiographically significant CAD in right-dominant circulation. Elevated filling/pulmonary pressures. Preserved CO/CI by assumed Francisco method.  - CXR in Home ED 10/5 small to moderate right pleural effusion  - repeat CXR pending  - 10/19 scheduled for MitraClip +/- tricuspid clip with Attizzani and Elgudin (hold Eliquis 3 days prior)  - Structural heart consulted, plan for discharge  prior to procedure following aggressive diuresis  - admit BNP on 10/5: 508 (previously 570 in 6/2023)  - HS trop: 22 --> 21  - admit wt at [place] 90.7 kg  - today's wt 89.6 kg (86.1) (84.9) (91.3)   - remains hypervolemic on exam  - stable Cr with nearly uncharged wt.  - rebolus 80mg IV lasix + metolazone 2.5mg + cont gtt at 20mg/hr   - consider initiation of spironolactone  - consider initiation of SLGT2i following DENIS  - Daily standing weights, strict I/Os, 2g sodium diet, 2L fluid restriction  - home torsemide 20mg     Atrial fibrillation with slow ventricular response   - per Dr. Mack: rate is reasonably well controlled, do not think restoration of sinus rhythm will have any benefit. Given his fall risk, and nosebleeds we can consider him for Watchman procedure. We will defer this until his valves are addressed.  - baseline HR 40-60s, asymptomatic   - c/w Eliquis 2.5mg BID     Right pretibial venous ulcer  PVD  - wound care followed patient as an outpatient and while inpatient at [place]  - wound care consulted, recs ordered -- keep legs elevated to reduce edema. Float heels. Change RLE (ankle/shin) dressing q3 days using Medihoney (order from [facility], oracle #[id number]), Adaptic (ie., Curity non-adherent strips), Aquacel Ag and Mepilex foam to secure. Apply lotion to dry periwound skin and LLE.   - family has orders from ID for clindamycin prior to and after procedure      Delayed speech  - patient exhibiting word slurring/trouble word finding on exam; although A&Ox3  - wife attributed to patient's current frustration  - Dr. Mack confirmed this is his observable baseline from previous office visits  - Dr. Mack recommending CT head if he has not received one previously  - OT consulted for MOCA score, pending evaluation   - mumbling speech but A/Ox3 deferring head CT for now      CKD III, stable   - baseline: 1.3-1.6  - Admit Cr: 1.47  - Cr trend 1.49 (1.48) (1.56) (1.61) (1.36) (1.51)  - diuresis as  above    Thrombocytopenia  - unclear baseline   - plts low 100s in 3/2023, then normal in March and May   - admit plts 93  - today's plts 77 (79) (82) (82)   - Heme consulted for thrombocytopenia -- requested labs ordered and pending  - aspirin is not contraindicated as platelets are above 50K     BPH  - c/w home tamsulosin         DVT ppx: Eliquis 2.5mg BID    Dispo   pending diuresis  scheduled for 10/19, PT/OT to assess, MOCA    Code Status  Full Code      Seen and discussed with Dr. Sue Hansen, PAChuckC

## 2023-10-11 NOTE — CARE PLAN
The patient's goals for the shift include decreased DEJESUS    The clinical goals for the shift include Pt will be negative 2 L by end of shift    Pt HDS, continues to diurese on IV lasix drip.

## 2023-10-11 NOTE — CARE PLAN
The patient's goals for the shift include decreased DEJESUS    The clinical goals for the shift include maintain negative fluid balance    Over the shift, the patient did make progress toward the following goals. Barriers to progression include cognitive limitations. Recommendations to address these barriers include education with teach back.

## 2023-10-12 ENCOUNTER — APPOINTMENT (OUTPATIENT)
Dept: VASCULAR MEDICINE | Facility: HOSPITAL | Age: 88
DRG: 291 | End: 2023-10-12
Payer: MEDICARE

## 2023-10-12 LAB
ALBUMIN SERPL BCP-MCNC: 3 G/DL (ref 3.4–5)
ANION GAP SERPL CALC-SCNC: 17 MMOL/L (ref 10–20)
BNP SERPL-MCNC: 492 PG/ML (ref 0–99)
BUN SERPL-MCNC: 64 MG/DL (ref 6–23)
CALCIUM SERPL-MCNC: 9.1 MG/DL (ref 8.6–10.6)
CHLORIDE SERPL-SCNC: 95 MMOL/L (ref 98–107)
CO2 SERPL-SCNC: 28 MMOL/L (ref 21–32)
CREAT SERPL-MCNC: 1.58 MG/DL (ref 0.5–1.3)
ERYTHROCYTE [DISTWIDTH] IN BLOOD BY AUTOMATED COUNT: 21.9 % (ref 11.5–14.5)
GFR SERPL CREATININE-BSD FRML MDRD: 42 ML/MIN/1.73M*2
GLUCOSE SERPL-MCNC: 145 MG/DL (ref 74–99)
HCT VFR BLD AUTO: 39 % (ref 41–52)
HGB BLD-MCNC: 12.2 G/DL (ref 13.5–17.5)
KAPPA LC SERPL-MCNC: 14.89 MG/DL (ref 0.33–1.94)
KAPPA LC/LAMBDA SER: 1.83 {RATIO} (ref 0.26–1.65)
LAMBDA LC SERPL-MCNC: 8.13 MG/DL (ref 0.57–2.63)
MAGNESIUM SERPL-MCNC: 2.47 MG/DL (ref 1.6–2.4)
MCH RBC QN AUTO: 23.3 PG (ref 26–34)
MCHC RBC AUTO-ENTMCNC: 31.3 G/DL (ref 32–36)
MCV RBC AUTO: 74 FL (ref 80–100)
NRBC BLD-RTO: 0.2 /100 WBCS (ref 0–0)
PHOSPHATE SERPL-MCNC: 4.7 MG/DL (ref 2.5–4.9)
PLATELET # BLD AUTO: 76 X10*3/UL (ref 150–450)
PMV BLD AUTO: ABNORMAL FL
POTASSIUM SERPL-SCNC: 3.7 MMOL/L (ref 3.5–5.3)
PROT SERPL-MCNC: 6.9 G/DL (ref 6.4–8.2)
PROT UR-ACNC: <4 MG/DL (ref 5–25)
RBC # BLD AUTO: 5.24 X10*6/UL (ref 4.5–5.9)
SODIUM SERPL-SCNC: 136 MMOL/L (ref 136–145)
WBC # BLD AUTO: 11.9 X10*3/UL (ref 4.4–11.3)

## 2023-10-12 PROCEDURE — 85027 COMPLETE CBC AUTOMATED: CPT | Mod: CMCLAB

## 2023-10-12 PROCEDURE — 93970 EXTREMITY STUDY: CPT | Performed by: INTERNAL MEDICINE

## 2023-10-12 PROCEDURE — 99232 SBSQ HOSP IP/OBS MODERATE 35: CPT | Performed by: INTERNAL MEDICINE

## 2023-10-12 PROCEDURE — 80069 RENAL FUNCTION PANEL: CPT | Mod: CMCLAB

## 2023-10-12 PROCEDURE — 84156 ASSAY OF PROTEIN URINE: CPT | Mod: CMCLAB

## 2023-10-12 PROCEDURE — 1200000002 HC GENERAL ROOM WITH TELEMETRY DAILY

## 2023-10-12 PROCEDURE — 83735 ASSAY OF MAGNESIUM: CPT | Mod: CMCLAB

## 2023-10-12 PROCEDURE — 84166 PROTEIN E-PHORESIS/URINE/CSF: CPT

## 2023-10-12 PROCEDURE — 2500000004 HC RX 250 GENERAL PHARMACY W/ HCPCS (ALT 636 FOR OP/ED)

## 2023-10-12 PROCEDURE — 83880 ASSAY OF NATRIURETIC PEPTIDE: CPT | Mod: CMCLAB

## 2023-10-12 PROCEDURE — 2500000001 HC RX 250 WO HCPCS SELF ADMINISTERED DRUGS (ALT 637 FOR MEDICARE OP)

## 2023-10-12 PROCEDURE — 2500000004 HC RX 250 GENERAL PHARMACY W/ HCPCS (ALT 636 FOR OP/ED): Performed by: STUDENT IN AN ORGANIZED HEALTH CARE EDUCATION/TRAINING PROGRAM

## 2023-10-12 PROCEDURE — 99222 1ST HOSP IP/OBS MODERATE 55: CPT | Performed by: STUDENT IN AN ORGANIZED HEALTH CARE EDUCATION/TRAINING PROGRAM

## 2023-10-12 PROCEDURE — 36415 COLL VENOUS BLD VENIPUNCTURE: CPT

## 2023-10-12 RX ORDER — BUMETANIDE 0.25 MG/ML
2 INJECTION INTRAMUSCULAR; INTRAVENOUS ONCE
Status: COMPLETED | OUTPATIENT
Start: 2023-10-12 | End: 2023-10-12

## 2023-10-12 RX ADMIN — BUMETANIDE 2 MG: 0.25 INJECTION, SOLUTION INTRAMUSCULAR; INTRAVENOUS at 13:26

## 2023-10-12 RX ADMIN — TAMSULOSIN HYDROCHLORIDE 0.4 MG: 0.4 CAPSULE ORAL at 20:31

## 2023-10-12 RX ADMIN — POTASSIUM CHLORIDE 40 MEQ: 1500 TABLET, EXTENDED RELEASE ORAL at 05:24

## 2023-10-12 RX ADMIN — BUMETANIDE 1 MG/HR: 0.25 INJECTION, SOLUTION INTRAMUSCULAR; INTRAVENOUS at 13:26

## 2023-10-12 RX ADMIN — APIXABAN 2.5 MG: 2.5 TABLET, FILM COATED ORAL at 08:52

## 2023-10-12 RX ADMIN — APIXABAN 2.5 MG: 2.5 TABLET, FILM COATED ORAL at 20:31

## 2023-10-12 RX ADMIN — TAMSULOSIN HYDROCHLORIDE 0.4 MG: 0.4 CAPSULE ORAL at 08:52

## 2023-10-12 ASSESSMENT — COGNITIVE AND FUNCTIONAL STATUS - GENERAL
MOBILITY SCORE: 20
TOILETING: A LITTLE
CLIMB 3 TO 5 STEPS WITH RAILING: A LITTLE
DRESSING REGULAR UPPER BODY CLOTHING: A LITTLE
DAILY ACTIVITIY SCORE: 20
HELP NEEDED FOR BATHING: A LITTLE
DRESSING REGULAR LOWER BODY CLOTHING: A LITTLE
STANDING UP FROM CHAIR USING ARMS: A LITTLE
WALKING IN HOSPITAL ROOM: A LITTLE
MOVING TO AND FROM BED TO CHAIR: A LITTLE

## 2023-10-12 ASSESSMENT — PAIN - FUNCTIONAL ASSESSMENT
PAIN_FUNCTIONAL_ASSESSMENT: 0-10
PAIN_FUNCTIONAL_ASSESSMENT: 0-10

## 2023-10-12 ASSESSMENT — PAIN SCALES - GENERAL
PAINLEVEL_OUTOF10: 0 - NO PAIN
PAINLEVEL_OUTOF10: 0 - NO PAIN

## 2023-10-12 NOTE — CARE PLAN
The patient's goals for the shift include decreased DEJESUS.    The clinical goals for the shift include to remain safe/injury-free with increased urine output.     Patient switched to Bumex gtt running at 4mL/hr. Bumex 80 IVP administered. Patient R arm edematous and red, peripheral IV removed. R arm ace bandaged, elevated, and ice applied per orders. RUE US done today. Patient bradycardia asymptomatic, EP consulted. R LE dressing changed per orders due next 10/15. Patient to continue to aggressively diurese to prepare for mitral clip procedure 10/19.

## 2023-10-12 NOTE — CONSULTS
Inpatient consult to Electrophysiology  Consult performed by: Kishore Martines MD  Consult ordered by: Neil Williamson PA-C        History Of Present Illness:    Patient is a 88-year-old gentleman with a history of HFpEF, atrial fibrillation, CKD, severe MR and TR who presents for optimization prior to DENIS.     Pt follows Dr. Martina duff.  Since admission patient has been in slow atrial fibrillation.  I reviewed his ECGs his last time he was in sinus rhythm appears to be 3/2023.    His HAFSA from 5/8/2023 shows an EF of 55% with mildly reduced RV function.  His left atrium is moderate to severely dilated.  Right atrium is severely dilated.  He has severe eccentric appearing MR, and severe TR.  He also had a small PFO.  He has nonobstructive CAD on recent coronary catheterization.    Patient is not the best historian.  He is unclear when he first had his first episode of atrial fibrillation but it appears to have been in his chart starting in 2021.  He was on Eliquis 2.5 twice daily.  Patient denies prior attempts at cardioversion or rhythm control otherwise.  His wife confirmed the above, said he was quite active and noticed afib in 2021 when he was getting cataracts surgery. 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    He is comfortable in bed, but does have a lot of swelling in his extremities.  He does not have any acute complaints this morning.  He has been on the cardiology service getting diuresis.  EP consulted for slow atrial fibrillation.         Last Recorded Vitals:  Vitals:    10/11/23 2330 10/12/23 0412 10/12/23 0805 10/12/23 1103   BP: 103/56 (!) 92/45 101/51 104/55   BP Location:    Right arm   Patient Position:  Lying  Sitting   Pulse: 50 (!) 43 (!) 45 (!) 45   Resp: 16 18 18 18   Temp: 35.6 °C (96 °F) 35.6 °C (96 °F) 35.4 °C (95.7 °F) 35.9 °C (96.7 °F)   TempSrc: Temporal Temporal Temporal Temporal   SpO2: 94% 94% 94% 97%   Weight:  90.7 kg (199 lb  15.3 oz)     Height:           Last Labs:  CBC - 10/11/2023:  6:56 PM  9.7 11.9 62    38.7      CMP - 10/11/2023:  6:57 PM  9.1 6.9 33 --- 1.9   4.0 3.2 20 108      PTT - 10/11/2023:  6:57 PM  2.2   24.9 38     Troponin I, High Sensitivity   Date/Time Value Ref Range Status   10/06/2023 10:43 PM 21 (H) 0 - 20 ng/L Final   10/05/2023 04:51 PM 22 (H) 0 - 20 ng/L Final     Troponin I   Date/Time Value Ref Range Status   03/21/2023 05:30 AM 25 (H) 0 - 20 ng/L Final     Comment:     .  Less than 99th percentile of normal range cutoff-  Female and children under 18 years old <14 ng/L; Male <21 ng/L: Negative  Repeat testing should be performed if clinically indicated.   .  Female and children under 18 years old 14-50 ng/L; Male 21-50 ng/L:  Consistent with possible cardiac damage and possible increased clinical   risk. Serial measurements may help to assess extent of myocardial damage.   .  >50 ng/L: Consistent with cardiac damage, increased clinical risk and  myocardial infarction. Serial measurements may help assess extent of   myocardial damage.   .   NOTE: Children less than 1 year old may have higher baseline troponin   levels and results should be interpreted in conjunction with the overall   clinical context.   .  NOTE: Troponin I testing is performed using a different   testing methodology at [facility] than at other   [facility]. Direct result comparisons should only   be made within the same method.     03/20/2023 02:05 PM 23 (H) 0 - 20 ng/L Final     Comment:     .  Less than 99th percentile of normal range cutoff-  Female and children under 18 years old <14 ng/L; Male <21 ng/L: Negative  Repeat testing should be performed if clinically indicated.   .  Female and children under 18 years old 14-50 ng/L; Male 21-50 ng/L:  Consistent with possible cardiac damage and possible increased clinical   risk. Serial measurements may help to assess extent of myocardial damage.   .  >50 ng/L: Consistent  with cardiac damage, increased clinical risk and  myocardial infarction. Serial measurements may help assess extent of   myocardial damage.   .   NOTE: Children less than 1 year old may have higher baseline troponin   levels and results should be interpreted in conjunction with the overall   clinical context.   .  NOTE: Troponin I testing is performed using a different   testing methodology at [facility] than at other   [facility]. Direct result comparisons should only   be made within the same method.       BNP   Date/Time Value Ref Range Status   10/05/2023 04:51  (H) 0 - 99 pg/mL Final   06/20/2023 09:55  (H) 0 - 99 pg/mL Final     Comment:     .  <100 pg/mL - Heart failure unlikely  100-299 pg/mL - Intermediate probability of acute heart  .               failure exacerbation. Correlate with clinical  .               context and patient history.    >=300 pg/mL - Heart Failure likely. Correlate with clinical  .               context and patient history.   Biotin interference may cause falsely decreased results.   Patients taking a Biotin dose of up to 5 mg/day should   refrain from taking Biotin for 24 hours before sample   collection. Providers may contact their local laboratory   for further information.     04/26/2023 09:14  (H) 0 - 99 pg/mL Final     Comment:     .  <100 pg/mL - Heart failure unlikely  100-299 pg/mL - Intermediate probability of acute heart  .               failure exacerbation. Correlate with clinical  .               context and patient history.    >=300 pg/mL - Heart Failure likely. Correlate with clinical  .               context and patient history.   Biotin interference may cause falsely decreased results.   Patients taking a Biotin dose of up to 5 mg/day should   refrain from taking Biotin for 24 hours before sample   collection. Providers may contact their local laboratory   for further information.       Hemoglobin A1C   Date/Time Value Ref Range  Status   03/21/2023 05:30 AM 6.6 (A) % Final     Comment:          Diagnosis of Diabetes-Adults   Non-Diabetic: < or = 5.6%   Increased risk for developing diabetes: 5.7-6.4%   Diagnostic of diabetes: > or = 6.5%  .       Monitoring of Diabetes                Age (y)     Therapeutic Goal (%)   Adults:          >18           <7.0   Pediatrics:    13-18           <7.5                   7-12           <8.0                   0- 6            7.5-8.5   American Diabetes Association. Diabetes Care 33(S1), Jan 2010.       VLDL   Date/Time Value Ref Range Status   03/21/2023 05:30 AM 8 0 - 40 mg/dL Final      Last I/O:  I/O last 3 completed shifts:  In: 1690.4 (18.6 mL/kg) [P.O.:1640; I.V.:50.4 (0.6 mL/kg)]  Out: 3575 (39.4 mL/kg) [Urine:3575 (1.1 mL/kg/hr)]  Weight: 90.7 kg       Past Medical History:  He has a past medical history of Acute on chronic combined systolic and diastolic heart failure (CMS/[facility]) (10/06/2023), Anxiety and depression (10/05/2023), Aortic valve disorder (03/21/2023), Atrial fibrillation (CMS/[facility]) (03/29/2023), Benign prostate hyperplasia (10/05/2023), CHF (congestive heart failure) (CMS/[facility]), Edema of extremities (10/07/2023), Hypertensive heart disease with heart failure (CMS/[facility]) (03/29/2023), Incarcerated right inguinal hernia (10/07/2023), Irregular heartbeat (10/07/2023), Mitral valve disease (03/21/2023), Numerous skin moles (10/07/2023), Onychomycosis due to dermatophyte (10/07/2023), Pain in both feet (10/07/2023), Peripheral edema (10/07/2023), Personal history of other medical treatment, Right inguinal hernia (10/07/2023), Unspecified diastolic (congestive) heart failure (CMS/HCC) (03/29/2023), Venous congestion (10/07/2023), Wounds, multiple open, lower extremity, unspecified laterality, sequela (10/07/2023), and Xerosis cutis (10/07/2023).    Past Surgical History:  He has a past surgical history that includes Other surgical history (09/16/2021).      Social History:  He reports that he  has quit smoking. His smoking use included cigarettes. He has never used smokeless tobacco. No history on file for alcohol use and drug use.    Family History:  No family history on file.     Allergies:  Patient has no known allergies.    Inpatient Medications:  Scheduled medications   Medication Dose Route Frequency    apixaban  2.5 mg oral BID    bumetanide  2 mg intravenous Once    polyethylene glycol  17 g oral Daily    tamsulosin  0.4 mg oral BID     PRN medications   Medication     Continuous Medications   Medication Dose Last Rate    bumetanide  1 mg/hr       Outpatient Medications:  Current Outpatient Medications   Medication Instructions    Eliquis 2.5 mg tablet 1 tablet, oral, 2 times daily    potassium chloride CR 10 mEq ER tablet 10 mEq, oral, Daily, Do not crush, chew, or split.    tamsulosin (FLOMAX) 0.4 mg, oral, 2 times daily    torsemide (DEMADEX) 40 mg, oral, Daily    vit C,H-Rl-ufhxy-lutein-zeaxan (PreserVision AREDS-2) 250-90-40-1 mg capsule 1 capsule, oral, Daily       Physical Exam:    GEN: NAD, frail, elderly  HEENT: ATNC, prominent chest and neck veins with prominent v wave  CV: irr irr, gema, systolic murmur heard throughout precordium  Pulm: diminished  Abdomen: NTND  Ext: warm, edema up to thighs; his RUE has warmth and erythema from elbow to wrist   Psych: appropriate       Assessment/Plan     Patient is an 88-year-old gentleman with history of HFpEF, atrial fibrillation, CKD, severe MR/TR.    Was around 2021 when he was discovered to have atrial fibrillation with additional MR and TR.  There have not really been attempts at rhythm control.  HAFSA on 5/8/2023 without left atrial appendage thrombus.  He has been compliant with his Eliquis 2.5 twice daily and has been taking it here otherwise.    #Severe MR  #Severe TR  #Heart failure with preserved ejection fraction  #Persistent atrial fibrillation with slow ventricular response (no prior attempts at sinus  rhythm)  #thrombocytopenia    Recommendation:  -Would recommend aggressive diuresis to euvolemia first, then followed by cardioversion.  This may help his heart rate, and even potentially his MR and TR which should be reevaluated after cardioversion  -Continue to monitor on telemetry  -Should he need a transvenous pacer or pacing system otherwise, will need to discuss implications of a tricuspid clip and coordinate with EP   -continue AC with eliquis 2.5  -consider amyloid work up    Patient discussed with Dr. Farah    EP Consult Pager: [phone] (weekday 7AM-6PM and weekend 7AM-2PM) and other: [phone]        Kishore Martines MD

## 2023-10-12 NOTE — PROGRESS NOTES
Subjective Data:  Patient made 2.6L overnight, net neg 1.2L although remains significantly hypervolemic to level of upper thighs, abdominal distension improved. Patient denies SOB or chest pain.    RN noted acute swelling and redness of R arm and elbow proximal to IV (connected to lasix gtt). Area is non tender with palpation. Skin luke warm to touch. Instructed RN to removed R PIV and replace on L arm. ACE wrap applied to R arm, to remain elevated with PRN ice packs for swelling. Venous duplex U/S ordered to rule out DVT.     - acute non tender, erythematous R arm swelling proximal to PIV. Luke warm to touch. PIV removed and replaced on LUE. ACE wrap applied to R arm, to remain elevated and PRN ice packs applied for swelling. Suspicious for IV infiltrate as patient has been relatively non responsive to aggressive lasix gtt  - Venous duplex U/S ordered to rule out DVT of RUE  - Afib with SVR HR 30-40s on tele this morning, typical baseline 50-60s, asymptomatic  - EP consulted: would recommend aggressive diuresis to euvolemia first, then followed by cardioversion.  This may help his heart rate, and even potentially his MR and TR which should be reevaluated after cardioversion. Should he need a transvenous pacer or pacing system otherwise, will need to discuss implications of a tricuspid clip and coordinate with EP. continue AC with eliquis 2.5. consider amyloid work up (light chain ratio 1.83, SPEP and UPEP pending)  - Heme consulted for thrombocytopenia -- requested labs resulted, pending recs today  - stable Cr with nearly uncharged wt, made 2.6L over last 24 hr following yesterday's regimen   - discontinued lasix gtt and started bumex gtt at 1mg/hr + IV bumex 2mg bolus x1, will assess response throughout the day   - R pretibial dressing change due today  - family updated at bedside    Overnight Events:    No acute events overnight     Objective Data:  Last Recorded Vitals:  Vitals:    10/11/23 1940 10/11/23 2330  10/12/23 0412 10/12/23 0805   BP: 106/63 103/56 (!) 92/45 101/51   BP Location:       Patient Position:   Lying    Pulse: 52 50 (!) 43 (!) 45   Resp: 18 16 18 18   Temp: 35.6 °C (96 °F) 35.6 °C (96 °F) 35.6 °C (96 °F) 35.4 °C (95.7 °F)   TempSrc: Temporal Temporal Temporal Temporal   SpO2: 99% 94% 94% 94%   Weight:   90.7 kg (199 lb 15.3 oz)    Height:           Last Labs:  CBC - 10/11/2023:  6:56 PM  9.7 11.9 62    38.7      CMP - 10/11/2023:  6:57 PM  9.1 6.9 33 --- 1.9   4.0 3.2 20 108      PTT - 10/11/2023:  6:57 PM  2.2   24.9 38     TROPHS   Date/Time Value Ref Range Status   10/06/2023 10:43 PM 21 0 - 20 ng/L Final   10/05/2023 04:51 PM 22 0 - 20 ng/L Final   03/21/2023 05:30 AM 25 0 - 20 ng/L Final     Comment:     .  Less than 99th percentile of normal range cutoff-  Female and children under 18 years old <14 ng/L; Male <21 ng/L: Negative  Repeat testing should be performed if clinically indicated.   .  Female and children under 18 years old 14-50 ng/L; Male 21-50 ng/L:  Consistent with possible cardiac damage and possible increased clinical   risk. Serial measurements may help to assess extent of myocardial damage.   .  >50 ng/L: Consistent with cardiac damage, increased clinical risk and  myocardial infarction. Serial measurements may help assess extent of   myocardial damage.   .   NOTE: Children less than 1 year old may have higher baseline troponin   levels and results should be interpreted in conjunction with the overall   clinical context.   .  NOTE: Troponin I testing is performed using a different   testing methodology at [facility] than at other   [facility] hospitals. Direct result comparisons should only   be made within the same method.     03/20/2023 02:05 PM 23 0 - 20 ng/L Final     Comment:     .  Less than 99th percentile of normal range cutoff-  Female and children under 18 years old <14 ng/L; Male <21 ng/L: Negative  Repeat testing should be performed if clinically indicated.    .  Female and children under 18 years old 14-50 ng/L; Male 21-50 ng/L:  Consistent with possible cardiac damage and possible increased clinical   risk. Serial measurements may help to assess extent of myocardial damage.   .  >50 ng/L: Consistent with cardiac damage, increased clinical risk and  myocardial infarction. Serial measurements may help assess extent of   myocardial damage.   .   NOTE: Children less than 1 year old may have higher baseline troponin   levels and results should be interpreted in conjunction with the overall   clinical context.   .  NOTE: Troponin I testing is performed using a different   testing methodology at [facility] than at other   [facility]. Direct result comparisons should only   be made within the same method.       BNP   Date/Time Value Ref Range Status   10/05/2023 04:51  0 - 99 pg/mL Final   06/20/2023 09:55  0 - 99 pg/mL Final     Comment:     .  <100 pg/mL - Heart failure unlikely  100-299 pg/mL - Intermediate probability of acute heart  .               failure exacerbation. Correlate with clinical  .               context and patient history.    >=300 pg/mL - Heart Failure likely. Correlate with clinical  .               context and patient history.   Biotin interference may cause falsely decreased results.   Patients taking a Biotin dose of up to 5 mg/day should   refrain from taking Biotin for 24 hours before sample   collection. Providers may contact their local laboratory   for further information.     04/26/2023 09:14  0 - 99 pg/mL Final     Comment:     .  <100 pg/mL - Heart failure unlikely  100-299 pg/mL - Intermediate probability of acute heart  .               failure exacerbation. Correlate with clinical  .               context and patient history.    >=300 pg/mL - Heart Failure likely. Correlate with clinical  .               context and patient history.   Biotin interference may cause falsely decreased results.   Patients  taking a Biotin dose of up to 5 mg/day should   refrain from taking Biotin for 24 hours before sample   collection. Providers may contact their local laboratory   for further information.       HGBA1C   Date/Time Value Ref Range Status   03/21/2023 05:30 AM 6.6 % Final     Comment:          Diagnosis of Diabetes-Adults   Non-Diabetic: < or = 5.6%   Increased risk for developing diabetes: 5.7-6.4%   Diagnostic of diabetes: > or = 6.5%  .       Monitoring of Diabetes                Age (y)     Therapeutic Goal (%)   Adults:          >18           <7.0   Pediatrics:    13-18           <7.5                   7-12           <8.0                   0- 6            7.5-8.5   American Diabetes Association. Diabetes Care 33(S1), Jan 2010.       VLDL   Date/Time Value Ref Range Status   03/21/2023 05:30 AM 8 0 - 40 mg/dL Final      Last I/O:  I/O last 3 completed shifts:  In: 1690.4 (18.6 mL/kg) [P.O.:1640; I.V.:50.4 (0.6 mL/kg)]  Out: 3575 (39.4 mL/kg) [Urine:3575 (1.1 mL/kg/hr)]  Weight: 90.7 kg     Past Cardiology Tests (Last 3 Years):    Echo: HAFSA 5/8/2023  1. Left ventricular systolic function is normal with a 55% estimated ejection fraction.  2. There is mildly reduced right ventricular systolic function.  3. The left atrium is moderately dilated.  4. The right atrium is severely dilated.  5. Severe mitral valve regurgitation.  6. Severe tricuspid regurgitation visualized.  7. Mild aortic valve regurgitation.  8. No left atrial mass.  9. No left atrial thrombus.  10. A bubble study using agitated saline was performed. Bubble study is positive. A small PFO (= 10 bubbles) was demonstrated.  11. There is plaque visualized in the descending aorta.  12. There is plaque visualized in the transverse aorta.    Cath: R/C 5/8/2023  1. 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  2. Elevated filling/pulmonary pressures.  3. Preserved CO/CI by assumed Francisco method.      Inpatient Medications:  Scheduled medications    Medication Dose Route Frequency    apixaban  2.5 mg oral BID    bumetanide  2 mg intravenous Once    polyethylene glycol  17 g oral Daily    tamsulosin  0.4 mg oral BID     PRN medications   Medication     Continuous Medications   Medication Dose Last Rate    bumetanide  1 mg/hr         Physical Exam:  General: sitting in chair, NAD  Skin: warm and dry. Dressing over anterior R pretibial venous ulcer. Venous skin changes present on bilateral LE. Compression stockings in place  Head/neck: JVD to mid neck at 90 degrees  Cardiac: S1S2, bradycardic, +systolic murmur   Pulm: bibasilar rhonchi  GI: soft, nontender   Extremities: 2-3+ bilat LE pitting edema to thighs. Acute non tender, erythematous R arm swelling proximal to PIV, extending from mid forearm, across elbow to mid upper arm, wrapping around to dorsal elbow. Luke warm to touch. Erythematous margins marked to monitor any expansion.   Neuro: mumbling, no focal neuro deficits   Psych: appropriate mood and behavior       Assessment/Plan     Acute on chronic diastolic heart failure  Severe MR & TR  - follows with Dr. Mack  - HAFSA 5/8/23 EF 55%, LA moderately dilated, RA severely dilated, severe MR, severe TR, small PFO (10 bubbles), plaque visualized in descending & transverse aorta  - L/RHC 5/08/2023 No angiographically significant CAD in right-dominant circulation. Elevated filling/pulmonary pressures. Preserved CO/CI by assumed Francisco method.  - CXR in [place] ED 10/5 small to moderate right pleural effusion  - repeat CXR pending  - 10/19 scheduled for MitraClip +/- tricuspid clip with Attjaden and Elgudin (hold Eliquis 3 days prior)  - Structural heart consulted, plan for discharge prior to procedure following aggressive diuresis  - admit BNP on 10/5: 508 (previously 570 in 6/2023) --> pending repeat this evening  - HS trop: 22 --> 21  - admit wt at [place] 90.7 kg  - today's wt 90.7 kg (89.6) (86.1) (84.9) (91.3)   - remains hypervolemic on exam, luke warm on  exam  - s/p daily rebolus IV lasix + metolazone 2.5mg + lasix gtt at 20mg/hr   - stable Cr with nearly uncharged wt, made 2.6L over last 24 hr following yesterday's regimen   - discontinued lasix gtt and started bumex gtt at 1mg/hr + IV bumex 2mg bolus x1, will assess response throughout the day   - consider initiation of spironolactone  - consider initiation of SLGT2i following DENIS  - Daily standing weights, strict I/Os, 2g sodium diet, 2L fluid restriction  - home torsemide 20mg     Atrial fibrillation with slow ventricular response   - per Dr. Mack: rate is reasonably well controlled, do not think restoration of sinus rhythm will have any benefit. Given his fall risk, and nosebleeds we can consider him for Watchman procedure. We will defer this until his valves are addressed.  - Afib with SVR HR 30-40s on tele this morning, typical baseline 50-60s, asymptomatic  - EP consulted: would recommend aggressive diuresis to euvolemia first, then followed by cardioversion.  This may help his heart rate, and even potentially his MR and TR which should be reevaluated after cardioversion. Should he need a transvenous pacer or pacing system otherwise, will need to discuss implications of a tricuspid clip and coordinate with EP. continue AC with eliquis 2.5. consider amyloid work up (light chain ratio 1.83, SPEP and UPEP pending)  - RN ambulated patient to chair to assess for physiologic HR elevation, HR did not reach >50bpm, noted that patient's HR did increase to 58bpm before returning to 40s early this morning when up to chair  - c/w Eliquis 2.5mg BID    RUE swelling  - acute non tender, erythematous R arm swelling proximal to PIV. Luke warm to touch  - PIV removed and replaced on LUE. ACE wrap applied to R arm, to remain elevated and PRN ice packs applied for swelling  - erythematous demarcation marked with skin marker to monitor for border expansion  - Suspicious for IV infiltrate as patient has been relatively non  responsive to aggressive lasix gtt  - Venous duplex U/S ordered to rule out DVT of RUE     Right pretibial venous ulcer  PVD  - wound care followed patient as an outpatient and while inpatient at [facility]  - wound care consulted, recs ordered -- keep legs elevated to reduce edema. Float heels. Change RLE (ankle/shin) dressing q3 days using Medihoney (order from [facility], oracle #[id number]), Adaptic (ie., Curity non-adherent strips), Aquacel Ag and Mepilex foam to secure. Apply lotion to dry periwound skin and LLE.   - due to dressing change today  - family has orders from ID for clindamycin prior to and after procedure      Delayed speech  - patient exhibiting word slurring/trouble word finding on exam; although A&Ox3  - wife attributed to patient's current frustration  - Dr. Mack confirmed this is his observable baseline from previous office visits  - Dr. Mack recommending CT head if he has not received one previously  - OT consulted for MOCA score, pending evaluation   - mumbling speech but A/Ox3 deferring head CT for now      CKD III, stable   - baseline: 1.3-1.6  - Admit Cr: 1.47  - Cr trend 1.49 (1.49) (1.48) (1.56) (1.61) (1.36) (1.51)  - diuresis as above    Thrombocytopenia  - unclear baseline   - plts low 100s in 3/2023, then normal in March and May   - admit plts 93  - today's plts 62 (77) (79) (82) (82)   - Heme consulted for thrombocytopenia -- requested labs resulted, recs appreciated today  - aspirin is not contraindicated as platelets are above 50K   ** family endorses significant craving of ice at home     BPH  - c/w home tamsulosin         DVT ppx: Eliquis 2.5mg BID    Dispo   pending diuresis, bradycardia evaluation/workup   scheduled for 10/19, PT/OT to assess, MOCA    Code Status  Full Code      Seen and discussed with Dr. Sue Hansen, PASONNY

## 2023-10-12 NOTE — CARE PLAN
The patient's goals for the shift include decreased DEJESUS    The clinical goals for the shift include to remain safe/injury-free      Patient remains on room air. Not SOB noted. Slight dyspnea with exertion but recovers well.

## 2023-10-12 NOTE — CARE PLAN
Problem: Safety  Goal: Patient will be injury free during hospitalization  10/12/2023 0504 by May Singleton RN  Outcome: Progressing  10/11/2023 2350 by May Singleton RN  Outcome: Progressing  10/11/2023 2245 by May Singleton RN  Outcome: Progressing  Goal: I will remain free of falls  10/12/2023 0504 by May Singleton RN  Outcome: Progressing  10/11/2023 2350 by May Singleton RN  Outcome: Progressing  10/11/2023 2245 by May Singleton RN  Outcome: Progressing     Problem: Daily Care  Goal: Daily care needs are met  Outcome: Progressing     Problem: Psychosocial Needs  Goal: Demonstrates ability to cope with hospitalization/illness  Outcome: Progressing     Problem: Fall/Injury  Goal: Not fall by end of shift  10/12/2023 0504 by May Singleton RN  Outcome: Progressing  10/11/2023 2245 by May Singleton RN  Outcome: Progressing  Goal: Be free from injury by end of the shift  10/12/2023 0504 by May Singleton RN  Outcome: Progressing  10/11/2023 2245 by May Singleton RN  Outcome: Progressing  Goal: Verbalize understanding of personal risk factors for fall in the hospital  10/12/2023 0504 by May Singleton RN  Outcome: Progressing  10/11/2023 2245 by May Singleton RN  Outcome: Progressing     Problem: Skin  Goal: Participates in plan/prevention/treatment measures  Outcome: Progressing  Goal: Prevent/manage excess moisture  10/12/2023 0504 by May Singleton RN  Outcome: Progressing  10/11/2023 2245 by May Singleton RN  Outcome: Progressing  Goal: Prevent/minimize sheer/friction injuries  Outcome: Progressing     Problem: Heart Failure  Goal: Improved urinary output this shift  Outcome: Progressing  Goal: Reduction in peripheral edema within 24 hours  Outcome: Progressing  Goal: Report improvement of dyspnea/breathlessness this shift  Outcome: Progressing     Problem: Pain - Adult  Goal: Verbalizes/displays adequate comfort level or baseline comfort level  Outcome: Progressing   The  patient's goals for the shift include decreased DEJESUS    The clinical goals for the shift include to remain safe/injury-free

## 2023-10-13 LAB
ALBUMIN SERPL BCP-MCNC: 2.8 G/DL (ref 3.4–5)
ANION GAP SERPL CALC-SCNC: 18 MMOL/L (ref 10–20)
APPEARANCE UR: CLEAR
BASOPHILS # BLD MANUAL: 0.15 X10*3/UL (ref 0–0.1)
BASOPHILS NFR BLD MANUAL: 1.8 %
BILIRUB UR STRIP.AUTO-MCNC: NEGATIVE MG/DL
BUN SERPL-MCNC: 67 MG/DL (ref 6–23)
BURR CELLS BLD QL SMEAR: ABNORMAL
CALCIUM SERPL-MCNC: 8.9 MG/DL (ref 8.6–10.6)
CHLORIDE SERPL-SCNC: 95 MMOL/L (ref 98–107)
CO2 SERPL-SCNC: 23 MMOL/L (ref 21–32)
COLOR UR: YELLOW
CREAT SERPL-MCNC: 1.45 MG/DL (ref 0.5–1.3)
EOSINOPHIL # BLD MANUAL: 0.07 X10*3/UL (ref 0–0.4)
EOSINOPHIL NFR BLD MANUAL: 0.9 %
ERYTHROCYTE [DISTWIDTH] IN BLOOD BY AUTOMATED COUNT: 22.2 % (ref 11.5–14.5)
GFR SERPL CREATININE-BSD FRML MDRD: 46 ML/MIN/1.73M*2
GLUCOSE BLD MANUAL STRIP-MCNC: 115 MG/DL (ref 74–99)
GLUCOSE BLD MANUAL STRIP-MCNC: 80 MG/DL (ref 74–99)
GLUCOSE SERPL-MCNC: 148 MG/DL (ref 74–99)
GLUCOSE UR STRIP.AUTO-MCNC: NEGATIVE MG/DL
HCT VFR BLD AUTO: 37.7 % (ref 41–52)
HGB BLD-MCNC: 11.7 G/DL (ref 13.5–17.5)
HYPOCHROMIA BLD QL SMEAR: ABNORMAL
IMM GRANULOCYTES # BLD AUTO: 0.03 X10*3/UL (ref 0–0.5)
IMM GRANULOCYTES NFR BLD AUTO: 0.4 % (ref 0–0.9)
KETONES UR STRIP.AUTO-MCNC: NEGATIVE MG/DL
LEUKOCYTE ESTERASE UR QL STRIP.AUTO: NEGATIVE
LYMPHOCYTES # BLD MANUAL: 0.15 X10*3/UL (ref 0.8–3)
LYMPHOCYTES NFR BLD MANUAL: 1.8 %
MCH RBC QN AUTO: 23.3 PG (ref 26–34)
MCHC RBC AUTO-ENTMCNC: 31 G/DL (ref 32–36)
MCV RBC AUTO: 75 FL (ref 80–100)
MONOCYTES # BLD MANUAL: 0.3 X10*3/UL (ref 0.05–0.8)
MONOCYTES NFR BLD MANUAL: 3.6 %
NEUTROPHILS # BLD MANUAL: 7.63 X10*3/UL (ref 1.6–5.5)
NEUTS BAND # BLD MANUAL: 0.15 X10*3/UL (ref 0–0.5)
NEUTS BAND NFR BLD MANUAL: 1.8 %
NEUTS SEG # BLD MANUAL: 7.48 X10*3/UL (ref 1.6–5)
NEUTS SEG NFR BLD MANUAL: 90.1 %
NITRITE UR QL STRIP.AUTO: NEGATIVE
NRBC BLD-RTO: 0 /100 WBCS (ref 0–0)
PH UR STRIP.AUTO: 6 [PH]
PHOSPHATE SERPL-MCNC: 4.6 MG/DL (ref 2.5–4.9)
PLATELET # BLD AUTO: 60 X10*3/UL (ref 150–450)
PMV BLD AUTO: ABNORMAL FL
POTASSIUM SERPL-SCNC: 3.4 MMOL/L (ref 3.5–5.3)
PROT UR STRIP.AUTO-MCNC: NEGATIVE MG/DL
RBC # BLD AUTO: 5.02 X10*6/UL (ref 4.5–5.9)
RBC # UR STRIP.AUTO: NEGATIVE /UL
RBC MORPH BLD: ABNORMAL
SODIUM SERPL-SCNC: 133 MMOL/L (ref 136–145)
SP GR UR STRIP.AUTO: 1.01
TARGETS BLD QL SMEAR: ABNORMAL
TOTAL CELLS COUNTED BLD: 111
UROBILINOGEN UR STRIP.AUTO-MCNC: <2 MG/DL
WBC # BLD AUTO: 8.3 X10*3/UL (ref 4.4–11.3)

## 2023-10-13 PROCEDURE — 85027 COMPLETE CBC AUTOMATED: CPT | Mod: CMCLAB

## 2023-10-13 PROCEDURE — 2500000004 HC RX 250 GENERAL PHARMACY W/ HCPCS (ALT 636 FOR OP/ED)

## 2023-10-13 PROCEDURE — 82947 ASSAY GLUCOSE BLOOD QUANT: CPT | Mod: CMCLAB

## 2023-10-13 PROCEDURE — 1200000002 HC GENERAL ROOM WITH TELEMETRY DAILY

## 2023-10-13 PROCEDURE — 85027 COMPLETE CBC AUTOMATED: CPT

## 2023-10-13 PROCEDURE — 85007 BL SMEAR W/DIFF WBC COUNT: CPT | Mod: CMCLAB

## 2023-10-13 PROCEDURE — 36415 COLL VENOUS BLD VENIPUNCTURE: CPT | Mod: CMCLAB

## 2023-10-13 PROCEDURE — 2500000001 HC RX 250 WO HCPCS SELF ADMINISTERED DRUGS (ALT 637 FOR MEDICARE OP)

## 2023-10-13 PROCEDURE — 36415 COLL VENOUS BLD VENIPUNCTURE: CPT

## 2023-10-13 PROCEDURE — 81003 URINALYSIS AUTO W/O SCOPE: CPT | Mod: CMCLAB

## 2023-10-13 PROCEDURE — 85007 BL SMEAR W/DIFF WBC COUNT: CPT

## 2023-10-13 PROCEDURE — 99232 SBSQ HOSP IP/OBS MODERATE 35: CPT | Performed by: INTERNAL MEDICINE

## 2023-10-13 PROCEDURE — 97110 THERAPEUTIC EXERCISES: CPT | Mod: GP

## 2023-10-13 PROCEDURE — 80069 RENAL FUNCTION PANEL: CPT | Mod: CMCLAB

## 2023-10-13 PROCEDURE — 97165 OT EVAL LOW COMPLEX 30 MIN: CPT | Mod: GO

## 2023-10-13 PROCEDURE — 97116 GAIT TRAINING THERAPY: CPT | Mod: GP

## 2023-10-13 PROCEDURE — 99222 1ST HOSP IP/OBS MODERATE 55: CPT | Performed by: STUDENT IN AN ORGANIZED HEALTH CARE EDUCATION/TRAINING PROGRAM

## 2023-10-13 RX ORDER — BUMETANIDE 0.25 MG/ML
2 INJECTION INTRAMUSCULAR; INTRAVENOUS ONCE
Status: COMPLETED | OUTPATIENT
Start: 2023-10-13 | End: 2023-10-13

## 2023-10-13 RX ORDER — POTASSIUM CHLORIDE 1.5 G/1.58G
40 POWDER, FOR SOLUTION ORAL
Status: COMPLETED | OUTPATIENT
Start: 2023-10-13 | End: 2023-10-14

## 2023-10-13 RX ADMIN — POLYETHYLENE GLYCOL 3350 17 G: 17 POWDER, FOR SOLUTION ORAL at 08:24

## 2023-10-13 RX ADMIN — BUMETANIDE 1 MG/HR: 0.25 INJECTION, SOLUTION INTRAMUSCULAR; INTRAVENOUS at 08:27

## 2023-10-13 RX ADMIN — BUMETANIDE 2 MG: 0.25 INJECTION, SOLUTION INTRAMUSCULAR; INTRAVENOUS at 10:30

## 2023-10-13 RX ADMIN — APIXABAN 2.5 MG: 2.5 TABLET, FILM COATED ORAL at 20:11

## 2023-10-13 RX ADMIN — TAMSULOSIN HYDROCHLORIDE 0.4 MG: 0.4 CAPSULE ORAL at 20:11

## 2023-10-13 RX ADMIN — APIXABAN 2.5 MG: 2.5 TABLET, FILM COATED ORAL at 08:24

## 2023-10-13 RX ADMIN — TAMSULOSIN HYDROCHLORIDE 0.4 MG: 0.4 CAPSULE ORAL at 08:24

## 2023-10-13 ASSESSMENT — COGNITIVE AND FUNCTIONAL STATUS - GENERAL
MOVING FROM LYING ON BACK TO SITTING ON SIDE OF FLAT BED WITH BEDRAILS: A LITTLE
EATING MEALS: A LITTLE
TURNING FROM BACK TO SIDE WHILE IN FLAT BAD: A LITTLE
DAILY ACTIVITIY SCORE: 13
MOVING FROM LYING ON BACK TO SITTING ON SIDE OF FLAT BED WITH BEDRAILS: A LITTLE
STANDING UP FROM CHAIR USING ARMS: A LOT
MOBILITY SCORE: 17
CLIMB 3 TO 5 STEPS WITH RAILING: TOTAL
MOVING TO AND FROM BED TO CHAIR: A LITTLE
DRESSING REGULAR UPPER BODY CLOTHING: A LITTLE
STANDING UP FROM CHAIR USING ARMS: A LOT
TURNING FROM BACK TO SIDE WHILE IN FLAT BAD: A LITTLE
DRESSING REGULAR LOWER BODY CLOTHING: A LOT
WALKING IN HOSPITAL ROOM: A LITTLE
DRESSING REGULAR UPPER BODY CLOTHING: A LOT
EATING MEALS: A LITTLE
DRESSING REGULAR UPPER BODY CLOTHING: A LOT
TOILETING: A LITTLE
STANDING UP FROM CHAIR USING ARMS: A LITTLE
HELP NEEDED FOR BATHING: A LOT
HELP NEEDED FOR BATHING: A LOT
TURNING FROM BACK TO SIDE WHILE IN FLAT BAD: A LITTLE
PERSONAL GROOMING: A LITTLE
WALKING IN HOSPITAL ROOM: A LITTLE
DAILY ACTIVITIY SCORE: 13
DRESSING REGULAR LOWER BODY CLOTHING: A LITTLE
CLIMB 3 TO 5 STEPS WITH RAILING: A LOT
CLIMB 3 TO 5 STEPS WITH RAILING: TOTAL
MOVING FROM LYING ON BACK TO SITTING ON SIDE OF FLAT BED WITH BEDRAILS: A LITTLE
WALKING IN HOSPITAL ROOM: A LITTLE
PERSONAL GROOMING: A LITTLE
EATING MEALS: A LITTLE
TOILETING: TOTAL
MOVING TO AND FROM BED TO CHAIR: A LOT
DRESSING REGULAR LOWER BODY CLOTHING: A LOT
MOVING TO AND FROM BED TO CHAIR: A LOT
MOBILITY SCORE: 14
TOILETING: TOTAL
MOBILITY SCORE: 14
HELP NEEDED FOR BATHING: A LITTLE
PERSONAL GROOMING: A LITTLE
DAILY ACTIVITIY SCORE: 18

## 2023-10-13 ASSESSMENT — PAIN - FUNCTIONAL ASSESSMENT
PAIN_FUNCTIONAL_ASSESSMENT: 0-10

## 2023-10-13 ASSESSMENT — ACTIVITIES OF DAILY LIVING (ADL)
ADL_ASSISTANCE: INDEPENDENT
BATHING_ASSISTANCE: MODERATE

## 2023-10-13 ASSESSMENT — PAIN SCALES - GENERAL
PAINLEVEL_OUTOF10: 0 - NO PAIN

## 2023-10-13 ASSESSMENT — ENCOUNTER SYMPTOMS
FEVER: 0
CHILLS: 0
CONFUSION: 1
WOUND: 1
FATIGUE: 1

## 2023-10-13 NOTE — PROGRESS NOTES
Subjective Data:  Patient made 2.4L overnight, net neg 2.1L although remains significantly hypervolemic to level of upper thighs, abdominal distension improved. Patient denies SOB or chest pain.  RUE remains wrapped, although elbow darker erythema noted around R elbow, pitting edema present, non tender with palpation.    - continued RUE swelling/pitting edema with darkened erythema, warmer to touch in comparison to yesterday with small fluid filled blister on dorsal aspect of elbow/forearm  - RUE venous duplex U/S negative for DVT  - WBC slowly uptrending, today 11.9   - dermatology consulted for concern of possible cellulitis   - Heme consulted for thrombocytopenia -- requested labs resulted, pending recs today  - stable Cr with nearly uncharged wt, made 2.4L over last 24 hr  -  c/w bumex gtt at 1mg/hr + IV bumex 2mg bolus x1    Overnight Events:    Overnight RN endorses RUE remained elevated throughout the night, noting some improvement in swelling in comparison to the beginning on her shift to early this AM.     Objective Data:  Last Recorded Vitals:  Vitals:    10/13/23 0449 10/13/23 0640 10/13/23 0717 10/13/23 0735   BP: 113/63  106/58    BP Location:       Patient Position: Lying      Pulse: 57  (!) 49    Resp: 18  18    Temp: 36 °C (96.8 °F)  35 °C (95 °F) 35.3 °C (95.5 °F)   TempSrc:       SpO2: 95%  95%    Weight:  91.8 kg (202 lb 6.1 oz)     Height:           Last Labs:  CBC - 10/12/2023:  8:17 PM  11.9 12.2 76    39.0      CMP - 10/12/2023:  8:17 PM  9.1 6.9 33 --- 1.9   4.7 3.0 20 108      PTT - 10/11/2023:  6:57 PM  2.2   24.9 38     TROPHS   Date/Time Value Ref Range Status   10/06/2023 10:43 PM 21 0 - 20 ng/L Final   10/05/2023 04:51 PM 22 0 - 20 ng/L Final   03/21/2023 05:30 AM 25 0 - 20 ng/L Final     Comment:     .  Less than 99th percentile of normal range cutoff-  Female and children under 18 years old <14 ng/L; Male <21 ng/L: Negative  Repeat testing should be performed if clinically indicated.    .  Female and children under 18 years old 14-50 ng/L; Male 21-50 ng/L:  Consistent with possible cardiac damage and possible increased clinical   risk. Serial measurements may help to assess extent of myocardial damage.   .  >50 ng/L: Consistent with cardiac damage, increased clinical risk and  myocardial infarction. Serial measurements may help assess extent of   myocardial damage.   .   NOTE: Children less than 1 year old may have higher baseline troponin   levels and results should be interpreted in conjunction with the overall   clinical context.   .  NOTE: Troponin I testing is performed using a different   testing methodology at [facility] than at other   [facility]. Direct result comparisons should only   be made within the same method.     03/20/2023 02:05 PM 23 0 - 20 ng/L Final     Comment:     .  Less than 99th percentile of normal range cutoff-  Female and children under 18 years old <14 ng/L; Male <21 ng/L: Negative  Repeat testing should be performed if clinically indicated.   .  Female and children under 18 years old 14-50 ng/L; Male 21-50 ng/L:  Consistent with possible cardiac damage and possible increased clinical   risk. Serial measurements may help to assess extent of myocardial damage.   .  >50 ng/L: Consistent with cardiac damage, increased clinical risk and  myocardial infarction. Serial measurements may help assess extent of   myocardial damage.   .   NOTE: Children less than 1 year old may have higher baseline troponin   levels and results should be interpreted in conjunction with the overall   clinical context.   .  NOTE: Troponin I testing is performed using a different   testing methodology at [facility] than at other   [facility]. Direct result comparisons should only   be made within the same method.       BNP   Date/Time Value Ref Range Status   10/12/2023 08:17  0 - 99 pg/mL Final   10/05/2023 04:51  0 - 99 pg/mL Final     HGBA1C    Date/Time Value Ref Range Status   03/21/2023 05:30 AM 6.6 % Final     Comment:          Diagnosis of Diabetes-Adults   Non-Diabetic: < or = 5.6%   Increased risk for developing diabetes: 5.7-6.4%   Diagnostic of diabetes: > or = 6.5%  .       Monitoring of Diabetes                Age (y)     Therapeutic Goal (%)   Adults:          >18           <7.0   Pediatrics:    13-18           <7.5                   7-12           <8.0                   0- 6            7.5-8.5   American Diabetes Association. Diabetes Care 33(S1), Jan 2010.       VLDL   Date/Time Value Ref Range Status   03/21/2023 05:30 AM 8 0 - 40 mg/dL Final      Last I/O:  I/O last 3 completed shifts:  In: 970.5 (10.6 mL/kg) [P.O.:875; I.V.:95.5 (1 mL/kg)]  Out: 4150 (45.2 mL/kg) [Urine:4150 (1.3 mL/kg/hr)]  Weight: 91.8 kg     Past Cardiology Tests (Last 3 Years):    Echo: HAFSA 5/8/2023  1. Left ventricular systolic function is normal with a 55% estimated ejection fraction.  2. There is mildly reduced right ventricular systolic function.  3. The left atrium is moderately dilated.  4. The right atrium is severely dilated.  5. Severe mitral valve regurgitation.  6. Severe tricuspid regurgitation visualized.  7. Mild aortic valve regurgitation.  8. No left atrial mass.  9. No left atrial thrombus.  10. A bubble study using agitated saline was performed. Bubble study is positive. A small PFO (= 10 bubbles) was demonstrated.  11. There is plaque visualized in the descending aorta.  12. There is plaque visualized in the transverse aorta.    Cath: R/LHC 5/8/2023  1. 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  2. Elevated filling/pulmonary pressures.  3. Preserved CO/CI by assumed Francisco method.      Inpatient Medications:  Scheduled medications   Medication Dose Route Frequency    apixaban  2.5 mg oral BID    bumetanide  2 mg intravenous Once    polyethylene glycol  17 g oral Daily    tamsulosin  0.4 mg oral BID     PRN medications   Medication      Continuous Medications   Medication Dose Last Rate    bumetanide  1 mg/hr 1 mg/hr (10/13/23 0827)       Physical Exam:  General: sitting in chair, NAD  Skin: warm and dry. Dressing over anterior R pretibial venous ulcer. Venous skin changes present on bilateral LE. Compression stockings in place  Head/neck: JVD to mid neck at 90 degrees  Cardiac: S1S2, bradycardic, +systolic murmur   Pulm: bibasilar rhonchi  GI: soft, nontender   Extremities: 3+ bilat LE pitting edema to thighs. Acute non tender, erythematous R arm swelling proximal to PIV, extending from mid forearm, across elbow to mid upper arm, wrapping around to ventral aspect of elbow. Newly formed small fluid filled blister on dorsal aspect of forearm. Warm to touch. Erythematous margins marked to monitor any expansion.   Neuro: mumbling, no focal neuro deficits   Psych: appropriate mood and behavior      Physical Exam  Musculoskeletal:        Arms:            Assessment/Plan     Acute on chronic diastolic heart failure  Severe MR & TR  - follows with Dr. Mack  - HAFSA 5/8/23 EF 55%, LA moderately dilated, RA severely dilated, severe MR, severe TR, small PFO (10 bubbles), plaque visualized in descending & transverse aorta  - L/RHC 5/08/2023 No angiographically significant CAD in right-dominant circulation. Elevated filling/pulmonary pressures. Preserved CO/CI by assumed Francisco method.  - CXR in [place] ED 10/5 small to moderate right pleural effusion  - repeat CXR pending  - 10/19 scheduled for MitraClip +/- tricuspid clip with Attizzani and Elgudin (hold Eliquis 3 days prior)  - Structural heart consulted, plan for discharge vs remain admitted prior to procedure to continue aggressive diuresis/optimization  - admit BNP on 10/5: 508 (previously 570 in 6/2023) --> repeat 492  - HS trop: 22 --> 21  - admit wt at [place] 90.7 kg  - today's wt 91.8kg (90.7)(89.6) (86.1) (84.9) (91.3)   - remains hypervolemic on exam, weight uptrending  - s/p daily rebolus IV lasix  + metolazone 2.5mg + lasix gtt at 20mg/hr   - stable Cr with uptrending weight, made 2.4L over last 24 hr  - c/w bumex gtt at 1mg/hr + IV bumex 2mg bolus x1 today  - consider initiation of spironolactone  - consider initiation of SLGT2i following DENIS  - Daily standing weights, strict I/Os, 2g sodium diet, 2L fluid restriction  - home torsemide 20mg     Atrial fibrillation with slow ventricular response   - per Dr. Mack: rate is reasonably well controlled, do not think restoration of sinus rhythm will have any benefit. Given his fall risk, and nosebleeds we can consider him for Watchman procedure. We will defer this until his valves are addressed.  - Afib with SVR HR 30-40s on tele this morning, typical baseline 50-60s, asymptomatic  - EP consulted: would recommend aggressive diuresis to euvolemia first, then followed by cardioversion.  This may help his heart rate, and even potentially his MR and TR which should be reevaluated after cardioversion. Should he need a transvenous pacer or pacing system otherwise, will need to discuss implications of a tricuspid clip and coordinate with EP. continue AC with eliquis 2.5. consider amyloid work up (light chain ratio 1.83, SPEP and UPEP pending)  - RN ambulated patient to chair to assess for physiologic HR elevation, HR did not reach >50bpm, noted that patient's HR did increase to 58bpm before returning to 40s early this morning when up to chair  - c/w Eliquis 2.5mg BID    CKD III, stable   - baseline: 1.3-1.6  - Admit Cr: 1.47  - Cr trend 1.58 (1.49 x2) (1.48) (1.56) (1.61) (1.36) (1.51)  - diuresis as above    RUE swelling  - 10/12 acute non tender, erythematous R arm swelling proximal to PIV. Luke warm to touch. PIV removed and replaced on LUE. ACE wrap applied to R arm, to remain elevated and PRN ice packs applied for swelling  - erythematous demarcation marked with skin marker to monitor for border expansion  - 10/13 continued RUE swelling/pitting edema with darkened  erythema and newly formed fluid filled blister  - Suspicious for IV infiltrate as patient has been relatively non responsive to aggressive lasix gtt vs cellulitis vs thrombophlebitis   - RUE Venous duplex U/S negative for DVT  - consulted dermatology, appreciate recs     Right pretibial venous ulcer  PVD  - wound care followed patient as an outpatient and while inpatient at [place] [facility]  - wound care consulted, recs ordered -- keep legs elevated to reduce edema. Float heels. Change RLE (ankle/shin) dressing q3 days using Medihoney (order from , oracle #[id number]), Adaptic (ie., Curity non-adherent strips), Aquacel Ag and Mepilex foam to secure. Apply lotion to dry periwound skin and LLE.   - next dressing change due 10/15  - per family: clindamycin 300mg 4x/day for 14 days to start a day prior to procedure (script given by OP wound care), SH agreeable to continue that plan  - dermatology to also assess ulcer while assess RUE (as above), appreciate recs    Mild leukocytosis  - WBC trend: 11.9 (9.7, 6.7, 6.2, 5.1, 6.0)  - patient remains afebrile  - dermatology to assess for concern of cellulitis of RUE +/- infection of R pretibial venous ulcer  - UA pending  - will continue trending WBC     Thrombocytopenia  - unclear baseline   - plts low 100s in 3/2023, then normal in March and May   - admit plts 93  - today's plts 76 (62) (77) (79) (82) (82)   - Heme consulted for thrombocytopenia -- requested labs resulted, recs appreciated today  - aspirin is not contraindicated as platelets are above 50K   ** family endorses significant craving of ice at home     Delayed speech  - patient exhibiting word slurring/trouble word finding on exam; although A&Ox3  - wife attributed to patient's current frustration  - family notes that patient tends to be forgetful/flustered medical settings, denies concerns for underlying  while at home   - Dr. Mack confirmed this is his observable baseline from previous office visits  - Dr. Mack  recommending CT head if he has not received one previously  - original consulted OT consulted for MOCA, however will postpone MOCA assessment until patient is discharged/to be completed with [facility] OT as he may score falsely low due to anxiety while in hospital setting   - mumbling speech but A/Ox3 deferring head CT for now     BPH  - c/w home tamsulosin         DVT ppx: Eliquis 2.5mg BID  Dispo   pending diuresis, MitraClip 10/19, +/- DCCV following diuresis    PT rec high intensity  OT to assess    Code Status  Full Code      Seen and discussed with Dr. Sue Hansen, PAChuckC

## 2023-10-13 NOTE — CARE PLAN
Problem: COGNITION/SAFETY  Goal: Patient will score WFL on standardized cognitive assessment with min cues and within reasonable time frame  Outcome: Progressing     Problem: ADLs  Goal: Patient with complete upper body dressing with SBA level of assistance donning and doffing all UE clothes with PRN adaptive equipment  Outcome: Progressing  Goal: Patient with complete lower body dressing with minimal assist  level of assistance donning and doffing all LE clothes  with PRN adaptive equipment  Outcome: Progressing  Goal: Patient will complete toileting including hygiene clothing management/hygiene with minimal assist  level of assistance.  Outcome: Progressing     Problem: TRANSFERS  Goal: Patient will perform bed mobility stand by assist level of assistance in order to improve safety and independence with mobility  Outcome: Progressing  Goal: Patient will complete functional transfer to chair/commode with least restrictive device with stand by assist level of assistance.  Outcome: Progressing

## 2023-10-13 NOTE — PROGRESS NOTES
Physical Therapy    Physical Therapy Treatment    Patient Name: Rajendra Chavis  MRN: [id number]  Today's Date: 10/13/2023  Time Calculation  Start Time: 1516  Stop Time: 1541  Time Calculation (min): 25 min       Assessment/Plan   PT Assessment  PT Assessment Results: Decreased strength, Decreased range of motion, Decreased endurance, Impaired balance, Decreased mobility  Rehab Prognosis: Good  Evaluation/Treatment Tolerance: Patient tolerated treatment well (blunted BP response)  Medical Staff Made Aware: Yes  Strengths: Housing layout, Support of Caregivers  End of Session Communication: Bedside nurse  Assessment Comment: Pt amb with CGA this session.  Requires Mod Ax1 to stand from chair height but able to complete 5 trials this session progressing to Min A from elevated bed height.  Pt continues to benefit from skilled PT and continues to be appropriate for high intensity level of care upon discharge.  End of Session Patient Position: Bed, 3 rail up, Alarm on  PT Plan  Inpatient/Swing Bed or Outpatient: Inpatient  PT Plan  Treatment/Interventions: Bed mobility, Transfer training, Gait training, Stair training, Balance training, Neuromuscular re-education, Endurance training, Range of motion, Therapeutic exercise, Therapeutic activity, Home exercise program, Postural re-education  PT Plan: Skilled PT  PT Frequency: 4 times per week  PT Discharge Recommendations: High intensity level of continued care  PT Recommended Transfer Status: Assist x1, Assistive device (RW)      General Visit Information:   PT  Visit  PT Received On: 10/13/23  General  Prior to Session Communication: Bedside nurse  Patient Position Received: Bed, 4 rail up, Alarm on  General Comment: Pt alert and agreeable to PT    Subjective   Precautions:  Precautions  Medical Precautions: Fall precautions  Vital Signs:  Vital Signs  Heart Rate: 65 (with ambulation)  Heart Rate Source: Monitor    Objective   Pain:  Pain Assessment  Pain Assessment:  0-10  Pain Score: 0 - No pain  Cognition:  Cognition  Overall Cognitive Status: Within Functional Limits  Orientation Level: Oriented X4    Activity Tolerance:  Activity Tolerance  Endurance: Tolerates 10 - 20 min exercise with multiple rests  Treatments:  Therapeutic Exercise  Therapeutic Exercise Performed: Yes  Therapeutic Exercise Activity 1: LAQ, ankle pumps, heel slides, quad sets, seated marches x10    Bed Mobility  Bed Mobility: Yes  Bed Mobility 1  Bed Mobility 1: Supine to sitting  Level of Assistance 1: Minimum assistance (to scoot hips forward at EOB to square off)  Bed Mobility 2  Bed Mobility  2: Sitting to supine  Level of Assistance 2: Minimum assistance  Bed Mobility Comments 2: LE management    Ambulation/Gait Training  Ambulation/Gait Training Performed: Yes  Ambulation/Gait Training 1  Surface 1: Level tile  Device 1: Rolling walker  Assistance 1: Contact guard  Quality of Gait 1: Narrow base of support (decreased step and stride length)  Comments/Distance (ft) 1: 75  Transfers  Transfer: Yes  Transfer 1  Transfer From 1: Bed to  Transfer to 1: Stand  Technique 1: Sit to stand, Stand to sit  Transfer Device 1: Walker  Transfer Level of Assistance 1: Moderate assistance, Minimum assistance  Trials/Comments 1: Mod Ax1 from chair height, Min Ax1 from elevated bed height x4 trials.    Outcome Measures:   [facility] Basic Mobility  Turning from your back to your side while in a flat bed without using bedrails: A little  Moving from lying on your back to sitting on the side of a flat bed without using bedrails: A little  Moving to and from bed to chair (including a wheelchair): A lot  Standing up from a chair using your arms (e.g. wheelchair or bedside chair): A lot  To walk in hospital room: A little  Climbing 3-5 steps with railing: Total  Basic Mobility - Total Score: 14       Education Documentation  Precautions, taught by Rica Dobbs PT at 10/13/2023  3:55 PM.  Learner: Patient  Readiness:  Acceptance  Method: Explanation  Response: Verbalizes Understanding    Mobility Training, taught by Rica Dobbs PT at 10/13/2023  3:55 PM.  Learner: Patient  Readiness: Acceptance  Method: Explanation  Response: Verbalizes Understanding    Education Comments  No comments found.        Encounter Problems       Encounter Problems (Active)       Balance       STG - Maintains dynamic sitting balance with upper extremity support (Progressing)       Start:  10/10/23    Expected End:  10/24/23       With independence               Mobility       STG - Patient will ambulate (Progressing)       Start:  10/10/23    Expected End:  10/24/23       >/= 200 feet with stable vital signs and 2WhW         STG - Patient will ascend and descend four to six stairs (Progressing)       Start:  10/10/23    Expected End:  10/24/23       With 1 HR and Min Assist +1            Pain - Adult          Transfers       STG - Transfer from bed to chair (Progressing)       Start:  10/10/23    Expected End:  10/24/23       With modified independence and 2WHW         STG - Patient will perform bed mobility (Progressing)       Start:  10/10/23    Expected End:  10/24/23       With modified independence and 2WHW         STG - Patient will transfer sit to and from stand (Progressing)       Start:  10/10/23    Expected End:  10/24/23       With modified independence and 2WHW

## 2023-10-13 NOTE — CARE PLAN
Assessment/Plan      Rajendra Chavis is a 88 y.o. male with PMHx of diastolic CHF (EF 50-55%), severe MR & TR, CKD 3, Afib on Eliquis, and R pretibial venous ulcer who presented to [facility] on 10/5/23 with complaints of worsened SOB, fatigue and abdominal/ leg edema. Patient was instructed to report to ED by his cardiologist for evaluation and admission to [facility] for optimization of his heart disease prior to DENIS scheduled on 10/19. While awaiting a bed at [facility], patient was started on lasix ggt at rate of 5 on admission at [place]. Transfer to [facility] for further optimization of CHF prior to scheduled DENIS. Patient then developed thrombocytopenia to the 80s.      Patient has a history of thrombocytopenia. 6 months ago patient was in the 90s for platelets which occurred when patient was on zosyn.     Smear: marked anisocytosis, not many schistocytes, some teardrops, some large platelets-although few in number overall, target cells, decreased cytoplasm noted in neutrophils.     Overall, aspirin is not contraindicated as platelets are above 50K. 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 While there was some mild elevation in k/l ratio, it was only minimal. Pending SPEP.   In order to better characterize the MDS we believe the patient needs a bone marrow biopsy. However, patient is scheduled for a mitral clip next week and we believe that the bmb can be done outpatient after the surgery.     Thank you for the consult. Hematology will sign-off. Please page [phone] with any questions. Patient seen and discussed with .        Patient seen and discussed with Attending Fox Tomlin  Med Student  Heme Onc

## 2023-10-13 NOTE — CONSULTS
Inpatient consult to Dermatology  Consult performed by: Daniele Dudley MD  Consult ordered by: Neil Williamson PA-C  Reason for consult: right arm erythema and edema    DERMATOLOGY DEPARTMENT CONSULTATION NOTE  Name: Rajendra Chavis  MRN: [id number]  : 1935      History of Present Illness  Rajendra Chavis is a 88 y.o. male with a past medical history of of HFpEF, atrial fibrillation, CKD, severe MR and TR presented on 10/8/2023 for optimization prior to DENIS (mitral transcatheter edge to edge repair). Dermatology was consulted for right arm erythema surrounding IV site for lasix gtt and also monitoring of chronic pretibial venous stasis wound.    Yesterday, the patient started developing redness around his IV site where he was receiving Lasix which traveled up his arm. He reports no pain or itching of the area, though it is red and warm to touch. One small blister was noted to be forming around the superior border of the current rash which has not ruptured. The IV has since been removed. He reports having an incidental unrelated scrape on his arm that was covered by a bandaid. He reports no other associated symptoms including fever or chills. U/S was obtained yesterday with no evidence of DVT.     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 This was debrided and grafted in 2023. Previous cultures 2023 demonstrated pseudomonas and proteus mirabilis infection. This wound is being dressed and cleaned daily. Primary team plans to start clindamycin prior to planned procedure next week 10/19 per wound care recommendation.     Review of Systems  Review of Systems   Constitutional:  Positive for fatigue. Negative for chills and fever.   Skin:  Positive for rash and wound.   Psychiatric/Behavioral:  Positive for confusion.         Past Medical History  Past Medical History:   Diagnosis Date    Acute on chronic combined systolic and diastolic  heart failure (CMS/[facility]) 10/06/2023    Anxiety and depression 10/05/2023    Aortic valve disorder 03/21/2023    Atrial fibrillation (CMS/[facility]) 03/29/2023    Benign prostate hyperplasia 10/05/2023    CHF (congestive heart failure) (CMS/HCC)     Edema of extremities 10/07/2023    Hypertensive heart disease with heart failure (CMS/HCC) 03/29/2023    Incarcerated right inguinal hernia 10/07/2023    Irregular heartbeat 10/07/2023    Mitral valve disease 03/21/2023    Numerous skin moles 10/07/2023    Onychomycosis due to dermatophyte 10/07/2023    Pain in both feet 10/07/2023    Peripheral edema 10/07/2023    Personal history of other medical treatment     History of echocardiogram    Right inguinal hernia 10/07/2023    Unspecified diastolic (congestive) heart failure (CMS/[facility]) 03/29/2023    Venous congestion 10/07/2023    Wounds, multiple open, lower extremity, unspecified laterality, sequela 10/07/2023    WOUNDS, MULTIPLE OPEN LOWER EXTREMITY, UNSPECIFIED LATERALITY SEQUELA    Xerosis cutis 10/07/2023       Past Surgical History   has a past surgical history that includes Other surgical history (09/16/2021).     Allergies  No Known Allergies    Medications  Scheduled Meds: apixaban, 2.5 mg, oral, BID  polyethylene glycol, 17 g, oral, Daily  tamsulosin, 0.4 mg, oral, BID       Continuous Infusions: bumetanide, 1 mg/hr, Last Rate: 1 mg/hr (10/13/23 0812)       Family History  No family history on file.    Social History   reports that he has quit smoking. His smoking use included cigarettes. He has never used smokeless tobacco. No history on file for alcohol use and drug use.     Objective    Vitals:    10/13/23 0640 10/13/23 0717 10/13/23 0735 10/13/23 1137   BP:  106/58  103/59   BP Location:    Right arm   Patient Position:    Sitting   Pulse:  (!) 49  (!) 47   Resp:  18  16   Temp:  35 °C (95 °F) 35.3 °C (95.5 °F) 37.3 °C (99.1 °F)   TempSrc:    Temporal   SpO2:  95%  96%   Weight: 91.8 kg (202 lb 6.1 oz)      Height:             Exam    GEN: no acute distress  NEURO: moving all extremities   EYES: conjunctiva and eyelids normal. No conjunctival injection or erosions appreciated  ENT:   - Lips: normal  NECK: normal and symmetric.   EXTREMITIES: no distal digital clubbing, cyanosis, petechiae. Wound on right pretibial lower leg (see below), b/l lower extremities 2+ edema with chronic venous stasis changes  SKIN: A full body skin exam including scalp, face, eyes, ears, neck, trunk, bilateral upper & lower extremities, toenails and fingernails were examined with the following findings:  - On the right forearm up to elbow, there is a ill-defined erythematous patch and ecchymosis. On posterior arm, there is edema without erythema. Proximally, there is a flaccid bulla. Right arm more edematous than left. No palpable cord or warmth or tenderness.   - Hemorrhagic erosion on proximal dorsal forearm.  -On right pretibial, clean and appropriately healing ulcer with peripheral and central granulation/fibrinous tissue without surrounding erythema    Laboratory and Data  Results for orders placed or performed during the hospital encounter of 10/08/23 (from the past 24 hour(s))   Protein, Urine Random   Result Value Ref Range    Total Protein, Urine Random <4 (L) 5 - 25 mg/dL   CBC   Result Value Ref Range    WBC 11.9 (H) 4.4 - 11.3 x10*3/uL    nRBC 0.2 (H) 0.0 - 0.0 /100 WBCs    RBC 5.24 4.50 - 5.90 x10*6/uL    Hemoglobin 12.2 (L) 13.5 - 17.5 g/dL    Hematocrit 39.0 (L) 41.0 - 52.0 %    MCV 74 (L) 80 - 100 fL    MCH 23.3 (L) 26.0 - 34.0 pg    MCHC 31.3 (L) 32.0 - 36.0 g/dL    RDW 21.9 (H) 11.5 - 14.5 %    Platelets 76 (L) 150 - 450 x10*3/uL    MPV     Renal Function Panel   Result Value Ref Range    Glucose 145 (H) 74 - 99 mg/dL    Sodium 136 136 - 145 mmol/L    Potassium 3.7 3.5 - 5.3 mmol/L    Chloride 95 (L) 98 - 107 mmol/L    Bicarbonate 28 21 - 32 mmol/L    Anion Gap 17 10 - 20 mmol/L    Urea Nitrogen 64 (H) 6 - 23 mg/dL    Creatinine 1.58  (H) 0.50 - 1.30 mg/dL    eGFR 42 (L) >60 mL/min/1.73m*2    Calcium 9.1 8.6 - 10.6 mg/dL    Phosphorus 4.7 2.5 - 4.9 mg/dL    Albumin 3.0 (L) 3.4 - 5.0 g/dL   Magnesium   Result Value Ref Range    Magnesium 2.47 (H) 1.60 - 2.40 mg/dL   B-type natriuretic peptide   Result Value Ref Range     (H) 0 - 99 pg/mL   Urinalysis with Reflex Microscopic   Result Value Ref Range    Color, Urine Yellow Straw, Yellow    Appearance, Urine Clear Clear    Specific Gravity, Urine 1.009 1.005 - 1.035    pH, Urine 6.0 5.0, 5.5, 6.0, 6.5, 7.0, 7.5, 8.0    Protein, Urine NEGATIVE NEGATIVE mg/dL    Glucose, Urine NEGATIVE NEGATIVE mg/dL    Blood, Urine NEGATIVE NEGATIVE    Ketones, Urine NEGATIVE NEGATIVE mg/dL    Bilirubin, Urine NEGATIVE NEGATIVE    Urobilinogen, Urine <2.0 <2.0 mg/dL    Nitrite, Urine NEGATIVE NEGATIVE    Leukocyte Esterase, Urine NEGATIVE NEGATIVE        Assessment/Plan   Rajendra Chavis is a 88 y.o. male with a past medical history of of HFpEF, atrial fibrillation, CKD, severe MR and TR presented on 10/8/2023 for optimization prior to DENIS (mitral transcatheter edge to edge repair). Dermatology was consulted for right arm erythema surrounding IV site for lasix gtt and also monitoring of chronic pretibial venous stasis wound s/p debridement and graft 07/2023.    Differential diagnoses: IV infiltration leading to edema bullae    Impression: Given history and clinical presentation of right arm, IV infiltration is possible. The fluid extravasation from IV medication leading to manifestation of edema blister, erythema, and ecchymosis. Right arm is notably more edematous than left. Ultrasound has ruled out evidence of DVT. Other considerations but less likely include thrombophlebitis, causing erythema over the forearms but no palpable cord noted. Cellulitis is less likely given lack of systemic symptoms and warmth, erythema of the entire right forearm, and tenderness.    Right pretibial wound s/p debridement and graft  does not look infected with overlying granulation/fibrinous tissue. Previous cultures 08/2023 demonstrated pseudomonas and proteus mirabilis infection (resistant to gentamicin and imipenem). This wound is being dressed and cleaned daily. Compression stockings were in place but not medical grade. Patient was recommended by wound care to start clindamycin prior to procedure.     Recommendations:  - Recommend ace wraps around right forearm up to elbow for compression and keep right arm elevated with pillows  - Trend CBC w/diff for leukocytosis and neutrophilia which can help support possible infectious etiology  - Continue wound care for right pretibial wound per wound team  - Right pretibial wound does not look concerning for infection at this time and does not require antibiotic treatment but will defer the decision of clindamycin treatment per primary team  - Recommend medical grade compression for bilateral lower legs  - Keep legs elevated with 2 to 3 pillows under legs to help with venous stasis while in the hospital    The patient was seen and discussed with attending physician Dr. Gordon Christianson. The assessment and plan was communicated to the care team.    Thank you for the consultation and for the opportunity to contribute to the care of this patient.      Daniele Dudley MD   PGY3, Dermatology  Epic chat (preferred)  Team pager [phone]      I saw and evaluated the patient. I personally obtained the key and critical portions of the history and physical exam or was physically present for key and critical portions performed by the resident. I reviewed the resident's documentation and discussed the patient with the resident. I agree with the resident's medical decision making as documented in the note.    Gordon Christianson MD

## 2023-10-13 NOTE — SIGNIFICANT EVENT
10/13/23 0720   Onset Documentation   Rapid Response Initiated By Radar auto page   Pager Time [phone]   Arrival Time 0725   Event End Time 0740   Level II Called No   Primary Reason for Call Radar auto page     Rapid Response Note    Radar auto-page received with a score of 6.  Vital signs are as follows: 35 (95), 49, 18, 106/58, 95%.  Patient has been in atrial fibrillation with a slow ventricular rate.  Vital signs reviewed with RN who has no concerns at this time.  RN will contact Rapid Response with any concerns or with patient deterioration.

## 2023-10-13 NOTE — CARE PLAN
Problem: Safety  Goal: Patient will be injury free during hospitalization  10/13/2023 0307 by May Singleton RN  Outcome: Progressing  10/13/2023 0306 by May Singleton RN  Outcome: Progressing  Goal: I will remain free of falls  10/13/2023 0307 by May Singleton RN  Outcome: Progressing  10/13/2023 0306 by May Singleton RN  Outcome: Progressing     Problem: Skin  Goal: Promote skin healing  Outcome: Progressing     Problem: Heart Failure  Goal: Improved urinary output this shift  Outcome: Progressing  Goal: Reduction in peripheral edema within 24 hours  Outcome: Progressing     Problem: Chronic Conditions and Co-morbidities  Goal: Patient's chronic conditions and co-morbidity symptoms are monitored and maintained or improved  Outcome: Progressing   The patient's goals for the shift include decreased DEJESUS    The clinical goals for the shift include decrease swelling/increase urine output

## 2023-10-13 NOTE — PROGRESS NOTES
Occupational Therapy    Evaluation    Patient Name: Rajendra Chavis  MRN: [id number]  Today's Date: 10/13/2023  Time Calculation  Start Time: 1126  Stop Time: 1138  Time Calculation (min): 12 min        Assessment:  Prognosis: Good  Barriers to Discharge:  (increased deconditioning, higher fall risk)  Evaluation/Treatment Tolerance: Patient limited by fatigue  Medical Staff Made Aware: Yes  End of Session Communication: Bedside nurse  End of Session Patient Position: Up in chair, Alarm on  OT Assessment Results: Decreased ADL status, Decreased upper extremity strength, Decreased safe judgment during ADL, Decreased endurance, Decreased cognition, Decreased functional mobility  Prognosis: Good  Barriers to Discharge:  (increased deconditioning, higher fall risk)  Evaluation/Treatment Tolerance: Patient limited by fatigue  Medical Staff Made Aware: Yes  Strengths: Housing layout, Support of Caregivers  Barriers to Participation: Comorbidities, Insight into problems  Plan:  Treatment Interventions: ADL retraining, Functional transfer training, UE strengthening/ROM  OT Frequency: 3 times per week  OT Discharge Recommendations: Moderate intensity level of continued care  OT Recommended Transfer Status: Assist of 2 (to chair/BSC)  Treatment Interventions: ADL retraining, Functional transfer training, UE strengthening/ROM    Subjective   Current Problem:  1. Acute heart failure with preserved ejection fraction (CMS/HCC)        2. Nonrheumatic mitral valve regurgitation  Referral to Home Health      3. Swelling of right upper extremity  Upper extremity venous duplex bilateral    Upper extremity venous duplex bilateral        General:  General  Reason for Referral: SOB, fatigue, edema  Past Medical History Relevant to Rehab: diastolic CHF (EF 50-55%), severe MR & TR, CKD 3, Afib on Eliquis, and R pretibial venous ulcer, anxiety, depression, BPH, past smoker  Family/Caregiver Present: No  Prior to Session Communication: Bedside  nurse  Patient Position Received: Bed, 3 rail up, Alarm on  Preferred Learning Style: visual, verbal  General Comment: Pt appears [place] at times, agreeable to therapy  Precautions:  Hearing/Visual Limitations: slightly [place], does not appear to wear hearing aides  Medical Precautions: Cardiac precautions, Fall precautions  Precautions Comment: Increased edema/redness to RUE, per EMR, (-) for DVT at this time  Vital Signs:  Heart Rate:  (pt in a-fib, HR remained in upper 40s/50s during session)  Pain:  Pain Assessment  Pain Assessment: 0-10  Pain Score: 0 - No pain    Objective   Cognition:  Overall Cognitive Status: Impaired  Arousal/Alertness: Delayed responses to stimuli  Orientation Level: Disoriented to situation, Disoriented to time (cues for day)  Following Commands: Follows one step commands with increased time  Safety Judgment: Decreased awareness of need for assistance (decreased need for safety)  Problem Solving:  (assist to identify, generate and implement solutions)  Attention: Within Functional Limits  Memory: Exceptions to WFL  Short-Term Memory: Impaired  Working Memory: Impaired  Memory Comments: Pt can benefit from further formal cognitive testing  Problem Solving: Exceptions to WFL  Safety/Judgement: Exceptions to WFL  Insight: Moderate (per RN, pt has been up without assist)  Planning: Reduced planning skills           Home Living:  Type of Home: House  Lives With: Spouse  Home Layout: One level  Home Access: Stairs to enter with rails  Entrance Stairs-Number of Steps: 2  Bathroom Shower/Tub: Tub/shower unit  Bathroom Equipment: Grab bars in shower  Home Living Comments: Pt reports does not own AD or was using PTA  Prior Function:  Level of [place]: Independent with ADLs and functional transfers, Needs assistance with homemaking  Receives Help From: Family (spouse)  ADL Assistance: Independent (per pt report: questionable historian)  Homemaking Assistance:  (reports shares IADLs with  spouse)  IADL History:  IADL Comments: Pt reports he drives  ADL:  Eating Assistance: Other (Comment)  Eating Deficit: Setup  Grooming Assistance: Minimal  Grooming Deficit: Setup, Increased time to complete (anticipated)  Bathing Assistance: Moderate  Bathing Deficit:  (anticipated for LB ADLs)  UE Dressing Assistance: Moderate  UE Dressing Deficit:  (anticipated)  LE Dressing Assistance: Maximal  LE Dressing Deficit: Don/doff R sock, Don/doff L sock, Setup, Steadying, Verbal cueing, Supervision/safety  Toileting Assistance with Device: Total  Toileting Deficit:  (Pt with purewick, requires increased A d/t balance and weakness)  Activity Tolerance:  Endurance: Tolerates 10 - 20 min exercise with multiple rests  Bed Mobility/Transfers: Bed Mobility  Bed Mobility: Yes  Bed Mobility 1  Bed Mobility 1: Supine to sitting  Level of Assistance 1: Moderate verbal cues  Bed Mobility Comments 1: HOB elevated, maximal verbal/tactile cueing for mechanics and sequencing   and Transfers  Transfer: Yes  Transfer 1  Transfer From 1: Sit to  Transfer to 1: Stand  Technique 1: Sit to stand  Transfer Device 1: Walker  Transfer Level of Assistance 1: Moderate assistance  Trials/Comments 1: Bed height elevated, tactile cueing for hand placement  Transfers 2  Transfer From 2: Bed to  Transfer to 2: Chair with arms  Technique 2: To right  Transfer Device 2: Walker  Transfer Level of Assistance 2: Moderate assistance, Maximum verbal cues  Trials/Comments 2: Increased physical assist to stabilize walker as well as guide and control transition into chair as pt attempting to sit before safe sitting clearance achieved   Modalities:     Vision:Vision - Basic Assessment  Current Vision: Wears glasses all the time  Sensation:  Light Touch: No apparent deficits  Strength:  Strength Comments: BUE strength 3/5 grossly  Perception:  Inattention/Neglect: Appears intact  Initiation: Cues to initiate tasks  Motor Planning: Appears  intact  Perseveration: Not present  Coordination:  Movements are Fluid and Coordinated: No (generalized coordination deficits)   Hand Function:  Gross Grasp: Functional  Coordination: Functional  Extremities: RUE   RUE :  (~3/4ths range shoulder flexion) and LUE   LUE:  (~3/4ths range shoulder flexion)    Outcome Measures:[facility] Daily Activity  Putting on and taking off regular lower body clothing: A lot  Bathing (including washing, rinsing, drying): A lot  Putting on and taking off regular upper body clothing: A lot  Toileting, which includes using toilet, bedpan or urinal: Total  Taking care of personal grooming such as brushing teeth: A little  Eating Meals: A little  Daily Activity - Total Score: 13      Education Documentation  Body Mechanics, taught by Mariely Alberto OT at 10/13/2023 12:34 PM.  Learner: Patient  Readiness: Acceptance  Method: Explanation, Demonstration  Response: Needs Reinforcement    EDUCATION:  Education  Individual(s) Educated: Patient  Education Provided: Fall precautons, Risk and benefits of OT discussed with patient or other  Risk and Benefits Discussed with Patient/Caregiver/Other: yes  Patient/Caregiver Demonstrated Understanding: yes  Plan of Care Discussed and Agreed Upon: yes  Patient Response to Education:  (requires additional education)    Goals:  Encounter Problems       Encounter Problems (Active)       ADLs       Patient with complete upper body dressing with SBA level of assistance donning and doffing all UE clothes with PRN adaptive equipment (Progressing)       Start:  10/13/23    Expected End:  10/27/23            Patient with complete lower body dressing with minimal assist  level of assistance donning and doffing all LE clothes  with PRN adaptive equipment (Progressing)       Start:  10/13/23    Expected End:  10/27/23            Patient will complete toileting including hygiene clothing management/hygiene with minimal assist  level of assistance. (Progressing)        Start:  10/13/23    Expected End:  10/27/23                 COGNITION/SAFETY       Patient will score WFL on standardized cognitive assessment with min cues and within reasonable time frame (Progressing)       Start:  10/13/23    Expected End:  10/27/23                 TRANSFERS       Patient will perform bed mobility stand by assist level of assistance in order to improve safety and independence with mobility (Progressing)       Start:  10/13/23    Expected End:  10/27/23            Patient will complete functional transfer to chair/commode with least restrictive device with stand by assist level of assistance. (Progressing)       Start:  10/13/23    Expected End:  10/27/23

## 2023-10-13 NOTE — CARE PLAN
The patient's goals for the shift include decreased DEJESUS    The clinical goals for the shift include Pt to deny SOB by end of shift    Pt continues to diurese.

## 2023-10-14 LAB
ALBUMIN SERPL BCP-MCNC: 2.9 G/DL (ref 3.4–5)
ANION GAP SERPL CALC-SCNC: 15 MMOL/L (ref 10–20)
BUN SERPL-MCNC: 75 MG/DL (ref 6–23)
CALCIUM SERPL-MCNC: 9.3 MG/DL (ref 8.6–10.6)
CHLORIDE SERPL-SCNC: 95 MMOL/L (ref 98–107)
CO2 SERPL-SCNC: 26 MMOL/L (ref 21–32)
CREAT SERPL-MCNC: 1.71 MG/DL (ref 0.5–1.3)
ERYTHROCYTE [DISTWIDTH] IN BLOOD BY AUTOMATED COUNT: 22.3 % (ref 11.5–14.5)
GFR SERPL CREATININE-BSD FRML MDRD: 38 ML/MIN/1.73M*2
GLUCOSE BLD MANUAL STRIP-MCNC: 104 MG/DL (ref 74–99)
GLUCOSE BLD MANUAL STRIP-MCNC: 109 MG/DL (ref 74–99)
GLUCOSE BLD MANUAL STRIP-MCNC: 130 MG/DL (ref 74–99)
GLUCOSE SERPL-MCNC: 111 MG/DL (ref 74–99)
HCT VFR BLD AUTO: 41.5 % (ref 41–52)
HGB BLD-MCNC: 12.8 G/DL (ref 13.5–17.5)
MCH RBC QN AUTO: 23.1 PG (ref 26–34)
MCHC RBC AUTO-ENTMCNC: 30.8 G/DL (ref 32–36)
MCV RBC AUTO: 75 FL (ref 80–100)
NRBC BLD-RTO: 0 /100 WBCS (ref 0–0)
PHOSPHATE SERPL-MCNC: 4.8 MG/DL (ref 2.5–4.9)
PLATELET # BLD AUTO: 71 X10*3/UL (ref 150–450)
PMV BLD AUTO: ABNORMAL FL
POTASSIUM SERPL-SCNC: 4.4 MMOL/L (ref 3.5–5.3)
RBC # BLD AUTO: 5.54 X10*6/UL (ref 4.5–5.9)
SODIUM SERPL-SCNC: 132 MMOL/L (ref 136–145)
WBC # BLD AUTO: 9.5 X10*3/UL (ref 4.4–11.3)

## 2023-10-14 PROCEDURE — 80069 RENAL FUNCTION PANEL: CPT

## 2023-10-14 PROCEDURE — 2500000001 HC RX 250 WO HCPCS SELF ADMINISTERED DRUGS (ALT 637 FOR MEDICARE OP)

## 2023-10-14 PROCEDURE — 2500000004 HC RX 250 GENERAL PHARMACY W/ HCPCS (ALT 636 FOR OP/ED)

## 2023-10-14 PROCEDURE — 99232 SBSQ HOSP IP/OBS MODERATE 35: CPT | Performed by: INTERNAL MEDICINE

## 2023-10-14 PROCEDURE — 2500000001 HC RX 250 WO HCPCS SELF ADMINISTERED DRUGS (ALT 637 FOR MEDICARE OP): Performed by: STUDENT IN AN ORGANIZED HEALTH CARE EDUCATION/TRAINING PROGRAM

## 2023-10-14 PROCEDURE — 82947 ASSAY GLUCOSE BLOOD QUANT: CPT | Mod: CMCLAB

## 2023-10-14 PROCEDURE — 85027 COMPLETE CBC AUTOMATED: CPT | Mod: CMCLAB | Performed by: NURSE PRACTITIONER

## 2023-10-14 PROCEDURE — 36415 COLL VENOUS BLD VENIPUNCTURE: CPT

## 2023-10-14 PROCEDURE — 2500000004 HC RX 250 GENERAL PHARMACY W/ HCPCS (ALT 636 FOR OP/ED): Performed by: NURSE PRACTITIONER

## 2023-10-14 PROCEDURE — 36415 COLL VENOUS BLD VENIPUNCTURE: CPT | Mod: CMCLAB | Performed by: NURSE PRACTITIONER

## 2023-10-14 PROCEDURE — 1200000002 HC GENERAL ROOM WITH TELEMETRY DAILY

## 2023-10-14 RX ORDER — POTASSIUM CHLORIDE 20 MEQ/1
40 TABLET, EXTENDED RELEASE ORAL ONCE
Status: COMPLETED | OUTPATIENT
Start: 2023-10-14 | End: 2023-10-14

## 2023-10-14 RX ORDER — POTASSIUM CHLORIDE 1.5 G/1.58G
POWDER, FOR SOLUTION ORAL
Status: COMPLETED
Start: 2023-10-14 | End: 2023-10-14

## 2023-10-14 RX ADMIN — POTASSIUM CHLORIDE 40 MEQ: 1500 TABLET, EXTENDED RELEASE ORAL at 08:33

## 2023-10-14 RX ADMIN — APIXABAN 2.5 MG: 2.5 TABLET, FILM COATED ORAL at 21:15

## 2023-10-14 RX ADMIN — POLYETHYLENE GLYCOL 3350 17 G: 17 POWDER, FOR SOLUTION ORAL at 08:33

## 2023-10-14 RX ADMIN — BUMETANIDE 1 MG/HR: 0.25 INJECTION, SOLUTION INTRAMUSCULAR; INTRAVENOUS at 19:22

## 2023-10-14 RX ADMIN — POTASSIUM CHLORIDE 40 MEQ: 1.5 POWDER, FOR SOLUTION ORAL at 04:50

## 2023-10-14 RX ADMIN — TAMSULOSIN HYDROCHLORIDE 0.4 MG: 0.4 CAPSULE ORAL at 21:15

## 2023-10-14 RX ADMIN — BUMETANIDE 1 MG/HR: 0.25 INJECTION, SOLUTION INTRAMUSCULAR; INTRAVENOUS at 02:55

## 2023-10-14 RX ADMIN — TAMSULOSIN HYDROCHLORIDE 0.4 MG: 0.4 CAPSULE ORAL at 08:33

## 2023-10-14 RX ADMIN — APIXABAN 2.5 MG: 2.5 TABLET, FILM COATED ORAL at 08:33

## 2023-10-14 RX ADMIN — POTASSIUM CHLORIDE 40 MEQ: 1.5 POWDER, FOR SOLUTION ORAL at 03:04

## 2023-10-14 ASSESSMENT — COGNITIVE AND FUNCTIONAL STATUS - GENERAL
MOVING TO AND FROM BED TO CHAIR: A LOT
STANDING UP FROM CHAIR USING ARMS: A LOT
DAILY ACTIVITIY SCORE: 13
MOVING FROM LYING ON BACK TO SITTING ON SIDE OF FLAT BED WITH BEDRAILS: A LITTLE
PERSONAL GROOMING: A LITTLE
DRESSING REGULAR UPPER BODY CLOTHING: A LOT
EATING MEALS: A LITTLE
TOILETING: TOTAL
TURNING FROM BACK TO SIDE WHILE IN FLAT BAD: A LITTLE
CLIMB 3 TO 5 STEPS WITH RAILING: TOTAL
WALKING IN HOSPITAL ROOM: A LITTLE
HELP NEEDED FOR BATHING: A LOT
DRESSING REGULAR LOWER BODY CLOTHING: A LOT
MOBILITY SCORE: 14

## 2023-10-14 NOTE — PROGRESS NOTES
Subjective Data:  No complaints, denies SOB  - cont bumex gtt  - potassium replaced  - creatinine stable 1.45    Overnight Events:    Uneventful overnight     Objective Data:  Last Recorded Vitals:  Vitals:    10/13/23 2343 10/14/23 0403 10/14/23 0700 10/14/23 0805   BP: 112/68 95/58  93/52   BP Location: Right arm   Right arm   Patient Position: Lying   Lying   Pulse: 56 53  59   Resp: 18 18  18   Temp: 35.3 °C (95.5 °F) 36.4 °C (97.5 °F)  35.4 °C (95.7 °F)   TempSrc: Temporal   Temporal   SpO2: 96% 100%  95%   Weight:   92.5 kg (203 lb 14.8 oz)    Height:           Last Labs:  Results for orders placed or performed during the hospital encounter of 10/08/23 (from the past 24 hour(s))   Urinalysis with Reflex Microscopic   Result Value Ref Range    Color, Urine Yellow Straw, Yellow    Appearance, Urine Clear Clear    Specific Gravity, Urine 1.009 1.005 - 1.035    pH, Urine 6.0 5.0, 5.5, 6.0, 6.5, 7.0, 7.5, 8.0    Protein, Urine NEGATIVE NEGATIVE mg/dL    Glucose, Urine NEGATIVE NEGATIVE mg/dL    Blood, Urine NEGATIVE NEGATIVE    Ketones, Urine NEGATIVE NEGATIVE mg/dL    Bilirubin, Urine NEGATIVE NEGATIVE    Urobilinogen, Urine <2.0 <2.0 mg/dL    Nitrite, Urine NEGATIVE NEGATIVE    Leukocyte Esterase, Urine NEGATIVE NEGATIVE   POCT GLUCOSE   Result Value Ref Range    POCT Glucose 80 74 - 99 mg/dL   Renal Function Panel   Result Value Ref Range    Glucose 148 (H) 74 - 99 mg/dL    Sodium 133 (L) 136 - 145 mmol/L    Potassium 3.4 (L) 3.5 - 5.3 mmol/L    Chloride 95 (L) 98 - 107 mmol/L    Bicarbonate 23 21 - 32 mmol/L    Anion Gap 18 10 - 20 mmol/L    Urea Nitrogen 67 (H) 6 - 23 mg/dL    Creatinine 1.45 (H) 0.50 - 1.30 mg/dL    eGFR 46 (L) >60 mL/min/1.73m*2    Calcium 8.9 8.6 - 10.6 mg/dL    Phosphorus 4.6 2.5 - 4.9 mg/dL    Albumin 2.8 (L) 3.4 - 5.0 g/dL   CBC and Auto Differential   Result Value Ref Range    WBC 8.3 4.4 - 11.3 x10*3/uL    nRBC 0.0 0.0 - 0.0 /100 WBCs    RBC 5.02 4.50 - 5.90 x10*6/uL    Hemoglobin 11.7  (L) 13.5 - 17.5 g/dL    Hematocrit 37.7 (L) 41.0 - 52.0 %    MCV 75 (L) 80 - 100 fL    MCH 23.3 (L) 26.0 - 34.0 pg    MCHC 31.0 (L) 32.0 - 36.0 g/dL    RDW 22.2 (H) 11.5 - 14.5 %    Platelets 60 (L) 150 - 450 x10*3/uL    MPV      Immature Granulocytes %, Automated 0.4 0.0 - 0.9 %    Immature Granulocytes Absolute, Automated 0.03 0.00 - 0.50 x10*3/uL   Manual Differential   Result Value Ref Range    Neutrophils %, Manual 90.1 40.0 - 80.0 %    Bands %, Manual 1.8 0.0 - 5.0 %    Lymphocytes %, Manual 1.8 13.0 - 44.0 %    Monocytes %, Manual 3.6 2.0 - 10.0 %    Eosinophils %, Manual 0.9 0.0 - 6.0 %    Basophils %, Manual 1.8 0.0 - 2.0 %    Seg Neutrophils Absolute, Manual 7.48 (H) 1.60 - 5.00 x10*3/uL    Bands Absolute, Manual 0.15 0.00 - 0.50 x10*3/uL    Lymphocytes Absolute, Manual 0.15 (L) 0.80 - 3.00 x10*3/uL    Monocytes Absolute, Manual 0.30 0.05 - 0.80 x10*3/uL    Eosinophils Absolute, Manual 0.07 0.00 - 0.40 x10*3/uL    Basophils Absolute, Manual 0.15 (H) 0.00 - 0.10 x10*3/uL    Total Cells Counted 111     Neutrophils Absolute, Manual 7.63 (H) 1.60 - 5.50 x10*3/uL    RBC Morphology See Below     Hypochromia Mild     Target Cells Few     [place] Cells Many    POCT GLUCOSE   Result Value Ref Range    POCT Glucose 115 (H) 74 - 99 mg/dL   POCT GLUCOSE   Result Value Ref Range    POCT Glucose 104 (H) 74 - 99 mg/dL        CBC - 10/13/2023:  5:40 PM  8.3 11.7 60    37.7      CMP - 10/13/2023:  5:40 PM  8.9 6.9 33 --- 1.9   4.6 2.8 20 108      PTT - 10/11/2023:  6:57 PM  2.2   24.9 38     TROPHS   Date/Time Value Ref Range Status   10/06/2023 10:43 PM 21 0 - 20 ng/L Final   10/05/2023 04:51 PM 22 0 - 20 ng/L Final   03/21/2023 05:30 AM 25 0 - 20 ng/L Final     Comment:     .  Less than 99th percentile of normal range cutoff-  Female and children under 18 years old <14 ng/L; Male <21 ng/L: Negative  Repeat testing should be performed if clinically indicated.   .  Female and children under 18 years old 14-50 ng/L; Male 21-50  ng/L:  Consistent with possible cardiac damage and possible increased clinical   risk. Serial measurements may help to assess extent of myocardial damage.   .  >50 ng/L: Consistent with cardiac damage, increased clinical risk and  myocardial infarction. Serial measurements may help assess extent of   myocardial damage.   .   NOTE: Children less than 1 year old may have higher baseline troponin   levels and results should be interpreted in conjunction with the overall   clinical context.   .  NOTE: Troponin I testing is performed using a different   testing methodology at [facility] than at other   system hospitals. Direct result comparisons should only   be made within the same method.     03/20/2023 02:05 PM 23 0 - 20 ng/L Final     Comment:     .  Less than 99th percentile of normal range cutoff-  Female and children under 18 years old <14 ng/L; Male <21 ng/L: Negative  Repeat testing should be performed if clinically indicated.   .  Female and children under 18 years old 14-50 ng/L; Male 21-50 ng/L:  Consistent with possible cardiac damage and possible increased clinical   risk. Serial measurements may help to assess extent of myocardial damage.   .  >50 ng/L: Consistent with cardiac damage, increased clinical risk and  myocardial infarction. Serial measurements may help assess extent of   myocardial damage.   .   NOTE: Children less than 1 year old may have higher baseline troponin   levels and results should be interpreted in conjunction with the overall   clinical context.   .  NOTE: Troponin I testing is performed using a different   testing methodology at [facility] than at other   [facility]. Direct result comparisons should only   be made within the same method.       BNP   Date/Time Value Ref Range Status   10/12/2023 08:17  0 - 99 pg/mL Final   10/05/2023 04:51  0 - 99 pg/mL Final     HGBA1C   Date/Time Value Ref Range Status   03/21/2023 05:30 AM 6.6 % Final  "    Comment:          Diagnosis of Diabetes-Adults   Non-Diabetic: < or = 5.6%   Increased risk for developing diabetes: 5.7-6.4%   Diagnostic of diabetes: > or = 6.5%  .       Monitoring of Diabetes                Age (y)     Therapeutic Goal (%)   Adults:          >18           <7.0   Pediatrics:    13-18           <7.5                   7-12           <8.0                   0- 6            7.5-8.5   American Diabetes Association. Diabetes Care 33(S1), Jan 2010.       VLDL   Date/Time Value Ref Range Status   03/21/2023 05:30 AM 8 0 - 40 mg/dL Final      Last I/O:  I/O last 3 completed shifts:  In: 1568.9 (17.1 mL/kg) [P.O.:1520; I.V.:48.9 (0.5 mL/kg)]  Out: 2150 (23.4 mL/kg) [Urine:2150 (0.7 mL/kg/hr)]  Weight: 91.8 kg     Past Cardiology Tests (Last 3 Years):  EKG:  No results found for this or any previous visit from the past 1095 days.    Echo:  No results found for this or any previous visit from the past 1095 days.    Ejection Fractions:  No results found for: \"EF\"  Cath:  No results found for this or any previous visit from the past 1095 days.    Stress Test:  No results found for this or any previous visit from the past 1095 days.    Cardiac Imaging:  No results found for this or any previous visit from the past 1095 days.      Inpatient Medications:  Scheduled medications   Medication Dose Route Frequency    apixaban  2.5 mg oral BID    polyethylene glycol  17 g oral Daily    tamsulosin  0.4 mg oral BID     PRN medications   Medication     Continuous Medications   Medication Dose Last Rate    bumetanide  1 mg/hr 1 mg/hr (10/14/23 0255)       Physical Exam:  General: NAD, lying in bed  Skin: warm and dry   Head/ neck: no JVD seen at 90 degrees  Cardiac: RRR, S1, S2   Pulm: mostly clear throughout through few fine crackles noted LLL  GI: soft, nontender   Extremities: 3+ pitting edema of lower ext.  Ext are warm to touch with 2+ radial pulses; faint pedal pulses  Neuro: no focal neuro deficits   Psych: " appropriate mod and behavior         Assessment/Plan   Acute on chronic diastolic heart failure  Severe MR & TR  - follows with Dr. Mack  - HAFSA 5/8/23 EF 55%, LA moderately dilated, RA severely dilated, severe MR, severe TR, small PFO (10 bubbles), plaque visualized in descending & transverse aorta  - L/RHC 5/08/2023 No angiographically significant CAD in right-dominant circulation. Elevated filling/pulmonary pressures. Preserved CO/CI by assumed Francisco method.  - CXR in [place] ED 10/5 small to moderate right pleural effusion  - repeat CXR pending  - 10/19 scheduled for MitraClip +/- tricuspid clip with Attizzani and Elgudin (hold Eliquis 3 days prior)  - Structural heart consulted, plan for discharge vs remain admitted prior to procedure to continue aggressive diuresis/optimization  - admit BNP on 10/5: 508 (previously 570 in 6/2023) --> repeat 492 on 10/13  - HS trop: 22 --> 21  - admit wt at [place] 90.7 kg  - today's wt 92.5 (91.8kg), 90.7)(89.6) (86.1) (84.9) (91.3)   - remains hypervolemic on exam, weight uptrending  - s/p daily rebolus IV lasix + metolazone 2.5mg + lasix gtt at 20mg/hr   - stable Cr with uptrending weight, made 2.4L over last 24 hr  - c/w bumex gtt at 1mg/hr + IV bumex 2mg bolus x1 today  - cont bumex gtt at 1mg/hr  - consider initiation of spironolactone  - consider initiation of SLGT2i following DENIS  - Daily standing weights, strict I/Os, 2g sodium diet, 2L fluid restriction  - home torsemide 20mg on hold     Atrial fibrillation with slow ventricular response   - per Dr. Mack: rate is reasonably well controlled, do not think restoration of sinus rhythm will have any benefit. Given his fall risk, and nosebleeds we can consider him for Watchman procedure. We will defer this until his valves are addressed.  - Afib with SVR HR 30-40s on tele this morning, typical baseline 50-60s, asymptomatic  - EP consulted: would recommend aggressive diuresis to euvolemia first, then followed by  cardioversion.  This may help his heart rate, and even potentially his MR and TR which should be reevaluated after cardioversion. Should he need a transvenous pacer or pacing system otherwise, will need to discuss implications of a tricuspid clip and coordinate with EP. continue AC with eliquis 2.5. consider amyloid work up (light chain ratio 1.83, SPEP and UPEP pending)  - RN ambulated patient to chair to assess for physiologic HR elevation, HR did not reach >50bpm, noted that patient's HR did increase to 58bpm before returning to 40s early this morning when up to chair  - c/w Eliquis 2.5mg BID     CKD III, stable   - baseline: 1.3-1.6  - Admit Cr: 1.47  - Cr trend 1.45 (1.58) (1.49 x2) (1.48) (1.56) (1.61) (1.36) (1.51)  - diuresis as above     RUE swelling  - 10/12 acute non tender, erythematous R arm swelling proximal to PIV. Luke warm to touch. PIV removed and replaced on LUE. ACE wrap applied to R arm, to remain elevated and PRN ice packs applied for swelling  - erythematous demarcation marked with skin marker to monitor for border expansion  - 10/13 continued RUE swelling/pitting edema with darkened erythema and newly formed fluid filled blister  - Suspicious for IV infiltrate as patient has been relatively non responsive to aggressive lasix gtt vs cellulitis vs thrombophlebitis   - RUE Venous duplex U/S negative for DVT  - consulted dermatology, appreciate recs  - cont ACE as previously rec     Right pretibial venous ulcer  PVD  - wound care followed patient as an outpatient and while inpatient at [facility]  - wound care consulted, recs ordered -- keep legs elevated to reduce edema. Float heels. Change RLE (ankle/shin) dressing q3 days using Medihoney (order from , oracle #[id number]), Adaptic (ie., Curity non-adherent strips), Aquacel Ag and Mepilex foam to secure. Apply lotion to dry periwound skin and LLE.   - next dressing change due 10/15  - per family: clindamycin 300mg 4x/day for 14 days to start a day  prior to procedure (script given by OP wound care), SH agreeable to continue that plan  - dermatology to also assess ulcer while assess RUE (as above), appreciate recs     Mild leukocytosis  - WBC trend: 8.3 (11.9, 9.7, 6.7, 6.2, 5.1, 6.0)  - patient remains afebrile  - dermatology to assess for concern of cellulitis of RUE +/- infection of R pretibial venous ulcer  - UA neg       Thrombocytopenia  - unclear baseline   - plts low 100s in 3/2023, then normal in March and May   - admit plts 93  - today's plts 60  (76) (62) (77) (79) (82) (82)   - Heme consulted for thrombocytopenia -- requested labs resulted, recs appreciated today  - aspirin is not contraindicated as platelets are above 50K   ** family endorses significant craving of ice at home      Delayed speech  - patient exhibiting word slurring/trouble word finding on exam; although A&Ox3  - wife attributed to patient's current frustration  - family notes that patient tends to be forgetful/flustered medical settings, denies concerns for underlying  while at home   - Dr. Mack confirmed this is his observable baseline from previous office visits  - Dr. Mack recommending CT head if he has not received one previously  - original consulted OT consulted for MOCA, however will postpone MOCA assessment until patient is discharged/to be completed with [facility] OT as he may score falsely low due to anxiety while in hospital setting   - mumbling speech but A/Ox3 deferring head CT for now      BPH  - c/w home tamsulosin         DVT ppx: Eliquis 2.5mg BID  Dispo   pending diuresis, MitraClip 10/19, +/- DCCV following diuresis    PT rec high intensity  OT to assess     Code Status  Full Code        Seen and discussed with Dr. Rogers      Peripheral IV 10/12/23 Left;Ventral Forearm (Active)   Site Assessment Clean;Dry;Intact 10/14/23 0900   Dressing Type Transparent 10/14/23 0900   Line Status Infusing 10/14/23 0900   Dressing Status Dry;Clean 10/14/23 0900   Number of days: 2        Code Status:  Full Code          Baylee Pal, APRN-CNP

## 2023-10-15 ENCOUNTER — APPOINTMENT (OUTPATIENT)
Dept: RADIOLOGY | Facility: HOSPITAL | Age: 88
DRG: 291 | End: 2023-10-15
Payer: MEDICARE

## 2023-10-15 LAB
GLUCOSE BLD MANUAL STRIP-MCNC: 105 MG/DL (ref 74–99)
GLUCOSE BLD MANUAL STRIP-MCNC: 113 MG/DL (ref 74–99)
GLUCOSE BLD MANUAL STRIP-MCNC: 122 MG/DL (ref 74–99)
GLUCOSE BLD MANUAL STRIP-MCNC: 125 MG/DL (ref 74–99)
GLUCOSE BLD MANUAL STRIP-MCNC: 97 MG/DL (ref 74–99)

## 2023-10-15 PROCEDURE — 2500000001 HC RX 250 WO HCPCS SELF ADMINISTERED DRUGS (ALT 637 FOR MEDICARE OP)

## 2023-10-15 PROCEDURE — 2500000001 HC RX 250 WO HCPCS SELF ADMINISTERED DRUGS (ALT 637 FOR MEDICARE OP): Performed by: NURSE PRACTITIONER

## 2023-10-15 PROCEDURE — 2500000004 HC RX 250 GENERAL PHARMACY W/ HCPCS (ALT 636 FOR OP/ED): Performed by: NURSE PRACTITIONER

## 2023-10-15 PROCEDURE — 1200000002 HC GENERAL ROOM WITH TELEMETRY DAILY

## 2023-10-15 PROCEDURE — 99232 SBSQ HOSP IP/OBS MODERATE 35: CPT | Performed by: INTERNAL MEDICINE

## 2023-10-15 PROCEDURE — 2500000004 HC RX 250 GENERAL PHARMACY W/ HCPCS (ALT 636 FOR OP/ED)

## 2023-10-15 PROCEDURE — 71046 X-RAY EXAM CHEST 2 VIEWS: CPT

## 2023-10-15 PROCEDURE — 99233 SBSQ HOSP IP/OBS HIGH 50: CPT | Performed by: STUDENT IN AN ORGANIZED HEALTH CARE EDUCATION/TRAINING PROGRAM

## 2023-10-15 PROCEDURE — 71046 X-RAY EXAM CHEST 2 VIEWS: CPT | Performed by: RADIOLOGY

## 2023-10-15 PROCEDURE — 82947 ASSAY GLUCOSE BLOOD QUANT: CPT

## 2023-10-15 RX ORDER — BUMETANIDE 0.25 MG/ML
2 INJECTION INTRAMUSCULAR; INTRAVENOUS ONCE
Status: COMPLETED | OUTPATIENT
Start: 2023-10-15 | End: 2023-10-15

## 2023-10-15 RX ORDER — METOLAZONE 5 MG/1
2.5 TABLET ORAL ONCE
Status: COMPLETED | OUTPATIENT
Start: 2023-10-15 | End: 2023-10-15

## 2023-10-15 RX ADMIN — BUMETANIDE 1 MG/HR: 0.25 INJECTION, SOLUTION INTRAMUSCULAR; INTRAVENOUS at 20:50

## 2023-10-15 RX ADMIN — METOLAZONE 2.5 MG: 5 TABLET ORAL at 12:33

## 2023-10-15 RX ADMIN — TAMSULOSIN HYDROCHLORIDE 0.4 MG: 0.4 CAPSULE ORAL at 20:12

## 2023-10-15 RX ADMIN — APIXABAN 2.5 MG: 2.5 TABLET, FILM COATED ORAL at 08:07

## 2023-10-15 RX ADMIN — APIXABAN 2.5 MG: 2.5 TABLET, FILM COATED ORAL at 20:12

## 2023-10-15 RX ADMIN — TAMSULOSIN HYDROCHLORIDE 0.4 MG: 0.4 CAPSULE ORAL at 08:07

## 2023-10-15 RX ADMIN — POLYETHYLENE GLYCOL 3350 17 G: 17 POWDER, FOR SOLUTION ORAL at 08:08

## 2023-10-15 RX ADMIN — BUMETANIDE 2 MG: 0.25 INJECTION, SOLUTION INTRAMUSCULAR; INTRAVENOUS at 12:33

## 2023-10-15 ASSESSMENT — COGNITIVE AND FUNCTIONAL STATUS - GENERAL
CLIMB 3 TO 5 STEPS WITH RAILING: TOTAL
MOVING TO AND FROM BED TO CHAIR: A LOT
TURNING FROM BACK TO SIDE WHILE IN FLAT BAD: A LOT
DAILY ACTIVITIY SCORE: 14
PERSONAL GROOMING: A LOT
STANDING UP FROM CHAIR USING ARMS: A LOT
DRESSING REGULAR LOWER BODY CLOTHING: A LOT
HELP NEEDED FOR BATHING: A LOT
STANDING UP FROM CHAIR USING ARMS: A LOT
PERSONAL GROOMING: A LOT
HELP NEEDED FOR BATHING: A LOT
MOVING TO AND FROM BED TO CHAIR: A LOT
DRESSING REGULAR UPPER BODY CLOTHING: A LOT
WALKING IN HOSPITAL ROOM: A LOT
MOBILITY SCORE: 12
DRESSING REGULAR LOWER BODY CLOTHING: A LOT
DAILY ACTIVITIY SCORE: 14
MOVING FROM LYING ON BACK TO SITTING ON SIDE OF FLAT BED WITH BEDRAILS: A LITTLE
TOILETING: A LOT
DRESSING REGULAR UPPER BODY CLOTHING: A LOT
MOBILITY SCORE: 12
CLIMB 3 TO 5 STEPS WITH RAILING: TOTAL
TOILETING: A LOT
WALKING IN HOSPITAL ROOM: A LOT
MOVING FROM LYING ON BACK TO SITTING ON SIDE OF FLAT BED WITH BEDRAILS: A LITTLE
TURNING FROM BACK TO SIDE WHILE IN FLAT BAD: A LOT

## 2023-10-15 ASSESSMENT — PAIN - FUNCTIONAL ASSESSMENT: PAIN_FUNCTIONAL_ASSESSMENT: 0-10

## 2023-10-15 ASSESSMENT — PAIN DESCRIPTION - DESCRIPTORS: DESCRIPTORS: SORE

## 2023-10-15 ASSESSMENT — PAIN SCALES - GENERAL: PAINLEVEL_OUTOF10: 7

## 2023-10-15 NOTE — PROGRESS NOTES
Rajendra Chavis is a 88 y.o. male on day 7 of admission presenting with Acute heart failure with preserved ejection fraction (CMS/HCC).    Subjective   Patient appears grossly volume overloaded   Currently on bumex drip will give metolzaone 2.5mg and bolus IV bumex 2 mg   Concern by nursing for getting weaker - needs max assistance with ADL's   Intermittently confused yesterday     Objective   Physical Exam  Trying to sit up   Warm and slightly cool  Rrr  Diminished bases  + 3 thighs warm pitting       Last Recorded Vitals  Blood pressure 92/54, pulse 51, temperature 36.2 °C (97.2 °F), temperature source Temporal, resp. rate 18, height 1.829 m (6'), weight 92.5 kg (203 lb 14.8 oz), SpO2 95 %.  Intake/Output last 3 Shifts:  I/O last 3 completed shifts:  In: 480 (5.2 mL/kg) [P.O.:480]  Out: 1600 (17.3 mL/kg) [Urine:1600 (0.5 mL/kg/hr)]  Weight: 92.5 kg     Relevant Results  Scheduled medications  apixaban, 2.5 mg, oral, BID  polyethylene glycol, 17 g, oral, Daily  tamsulosin, 0.4 mg, oral, BID      Continuous medications  bumetanide, 1 mg/hr, Last Rate: 1 mg/hr (10/14/23 1922)      PRN medications     Results for orders placed or performed during the hospital encounter of 10/08/23 (from the past 24 hour(s))   POCT GLUCOSE   Result Value Ref Range    POCT Glucose 130 (H) 74 - 99 mg/dL   POCT GLUCOSE   Result Value Ref Range    POCT Glucose 109 (H) 74 - 99 mg/dL   Renal Function Panel   Result Value Ref Range    Glucose 111 (H) 74 - 99 mg/dL    Sodium 132 (L) 136 - 145 mmol/L    Potassium 4.4 3.5 - 5.3 mmol/L    Chloride 95 (L) 98 - 107 mmol/L    Bicarbonate 26 21 - 32 mmol/L    Anion Gap 15 10 - 20 mmol/L    Urea Nitrogen 75 (H) 6 - 23 mg/dL    Creatinine 1.71 (H) 0.50 - 1.30 mg/dL    eGFR 38 (L) >60 mL/min/1.73m*2    Calcium 9.3 8.6 - 10.6 mg/dL    Phosphorus 4.8 2.5 - 4.9 mg/dL    Albumin 2.9 (L) 3.4 - 5.0 g/dL   CBC   Result Value Ref Range    WBC 9.5 4.4 - 11.3 x10*3/uL    nRBC 0.0 0.0 - 0.0 /100 WBCs    RBC 5.54  4.50 - 5.90 x10*6/uL    Hemoglobin 12.8 (L) 13.5 - 17.5 g/dL    Hematocrit 41.5 41.0 - 52.0 %    MCV 75 (L) 80 - 100 fL    MCH 23.1 (L) 26.0 - 34.0 pg    MCHC 30.8 (L) 32.0 - 36.0 g/dL    RDW 22.3 (H) 11.5 - 14.5 %    Platelets 71 (L) 150 - 450 x10*3/uL    MPV            Assessment/Plan   Principal Problem:    Acute heart failure with preserved ejection fraction (CMS/HCC)  Active Problems:    Chronic kidney disease, stage 3 unspecified (CMS/HCC)    Nonrheumatic mitral valve regurgitation    Tricuspid regurgitation    Chronic venous insufficiency    Venous stasis ulcer of calf (CMS/HCC)  Acute on chronic diastolic heart failure  Severe MR & TR  - follows with Dr. Mack  - HAFSA 5/8/23 EF 55%, LA moderately dilated, RA severely dilated, severe MR, severe TR, small PFO (10 bubbles), plaque visualized in descending & transverse aorta  - L/RHC 5/08/2023 No angiographically significant CAD in right-dominant circulation. Elevated filling/pulmonary pressures. Preserved CO/CI by assumed Francisco method.  - CXR in [place] ED 10/5 small to moderate right pleural effusion  - 10/15 repeat CXR   - 10/19 scheduled for MitraClip +/- tricuspid clip with Attizzani and Elgudin (hold Eliquis 3 days prior)  - Structural heart consulted, plan for discharge vs remain admitted prior to procedure to continue aggressive diuresis/optimization  - admit BNP on 10/5: 508 (previously 570 in 6/2023) --> repeat 492 on 10/13 >> 10/15 repeat  - HS trop: 22 --> 21  - admit wt at [place] 90.7 kg  - today's wt 92.5 (91.8kg), 90.7)(89.6) (86.1) (84.9) (91.3)   - remains hypervolemic on exam, weight uptrending  - s/p daily rebolus IV lasix + metolazone 2.5mg + lasix gtt at 20mg/hr   - stable Cr with uptrending weight, made 2.4L over last 24 hr  - c/w bumex gtt at 1mg/hr + IV bumex 2mg bolus x1 today  - cont bumex gtt at 1mg/hr  - consider initiation of spironolactone  - consider initiation of SLGT2i following DENIS  - Daily standing weights, strict I/Os, 2g sodium  diet, 2L fluid restriction  - home torsemide 20mg on hold     Atrial fibrillation with slow ventricular response   - per Dr. Mack: rate is reasonably well controlled, do not think restoration of sinus rhythm will have any benefit. Given his fall risk, and nosebleeds we can consider him for Watchman procedure. We will defer this until his valves are addressed.  - Afib with SVR HR 30-40s on tele this morning, typical baseline 50-60s, asymptomatic  - EP consulted: would recommend aggressive diuresis to euvolemia first, then followed by cardioversion.  This may help his heart rate, and even potentially his MR and TR which should be reevaluated after cardioversion. Should he need a transvenous pacer or pacing system otherwise, will need to discuss implications of a tricuspid clip and coordinate with EP. continue AC with eliquis 2.5. consider amyloid work up (light chain ratio 1.83, SPEP and UPEP pending)  - RN ambulated patient to chair to assess for physiologic HR elevation, HR did not reach >50bpm, noted that patient's HR did increase to 58bpm before returning to 40s early this morning when up to chair  - c/w Eliquis 2.5mg BID     CKD III, stable   - baseline: 1.3-1.6  - Admit Cr: 1.47  - Cr trend 1.71, 1.45 (1.58) (1.49 x2) (1.48) (1.56) (1.61) (1.36) (1.51)  - diuresis as above     RUE swelling  - 10/12 acute non tender, erythematous R arm swelling proximal to PIV. Luke warm to touch. PIV removed and replaced on LUE. ACE wrap applied to R arm, to remain elevated and PRN ice packs applied for swelling  - erythematous demarcation marked with skin marker to monitor for border expansion  - 10/13 continued RUE swelling/pitting edema with darkened erythema and newly formed fluid filled blister  - Suspicious for IV infiltrate as patient has been relatively non responsive to aggressive lasix gtt vs cellulitis vs thrombophlebitis   - RUE Venous duplex U/S negative for DVT  - consulted dermatology, appreciate recs  - cont  ACE as previously rec     Right pretibial venous ulcer  PVD  - wound care followed patient as an outpatient and while inpatient at [place]  - wound care consulted, recs ordered -- keep legs elevated to reduce edema. Float heels. Change RLE (ankle/shin) dressing q3 days using Medihoney (order from , oracle #[id number]), Adaptic (ie., Curity non-adherent strips), Aquacel Ag and Mepilex foam to secure. Apply lotion to dry periwound skin and LLE.   - next dressing change due 10/15  - per family: clindamycin 300mg 4x/day for 14 days to start a day prior to procedure (script given by OP wound care), SH agreeable to continue that plan  - dermatology to also assess ulcer while assess RUE (as above), appreciate recs     Mild leukocytosis  - WBC trend: 9.5  (11.9, 9.7, 6.7, 6.2, 5.1, 6.0)  - patient remains afebrile  - dermatology to assess for concern of cellulitis of RUE +/- infection of R pretibial venous ulcer  - UA neg        Thrombocytopenia  - unclear baseline   - plts low 100s in 3/2023, then normal in March and May   - admit plts 93  - today's plts 71, 60  (76) (62) (77) (79) (82) (82)   - Heme consulted for thrombocytopenia -- requested labs resulted, recs appreciated Heme consulted for thrombocytopenia: 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 While there was some mild elevation in k/l ratio, it was only minimal. Pending SPEP.   In order to better characterize the MDS we believe the patient needs a bone marrow biopsy. However, patient is scheduled for a mitral clip next week and we believe that the bmb can be done outpatient after the surgery.   - aspirin is not contraindicated as platelets are above 50K   ** family endorses significant craving of ice at home      Delayed speech  - patient exhibiting word slurring/trouble word finding on exam; although A&Ox3  - wife attributed to patient's current frustration  - family notes that patient tends to  be forgetful/flustered medical settings, denies concerns for underlying  while at home   - Dr. Mack confirmed this is his observable baseline from previous office visits  - Dr. Mack recommending CT head if he has not received one previously  - original consulted OT consulted for MOCA, however will postpone MOCA assessment until patient is discharged/to be completed with [facility] OT as he may score falsely low due to anxiety while in hospital setting   - mumbling speech but A/Ox3 deferring head CT for now      BPH  - c/w home tamsulosin         DVT ppx: Eliquis 2.5mg BID  Dispo   pending diuresis, MitraClip 10/19, +/- DCCV following diuresis    PT rec high intensity  OT to assess     Code Status  Full Code       BRIAN Judd-CNP    Seen and discussed with Dr Catalan

## 2023-10-15 NOTE — PROGRESS NOTES
Subjective Data:  No events overnight  Pt eating breakfast  Still with some swelling in leg     Objective Data:  Last Recorded Vitals:  Vitals:    10/14/23 1951 10/15/23 0033 10/15/23 0505 10/15/23 0748   BP: 107/64 102/55 115/65 92/54   BP Location:       Patient Position:       Pulse: 62 60 52 51   Resp: 18 18 18 18   Temp: 36.2 °C (97.2 °F) 36.8 °C (98.2 °F) 36.7 °C (98.1 °F) 36.2 °C (97.2 °F)   TempSrc:  Temporal Temporal Temporal   SpO2: 94% 96% 95% 95%   Weight:       Height:           Last Labs:  CBC - 10/14/2023:  7:32 PM  9.5 12.8 71    41.5      CMP - 10/14/2023:  7:32 PM  9.3 6.9 33 --- 1.9   4.8 2.9 20 108      PTT - 10/11/2023:  6:57 PM  2.2   24.9 38     TROPHS   Date/Time Value Ref Range Status   10/06/2023 10:43 PM 21 0 - 20 ng/L Final   10/05/2023 04:51 PM 22 0 - 20 ng/L Final   03/21/2023 05:30 AM 25 0 - 20 ng/L Final     Comment:     .  Less than 99th percentile of normal range cutoff-  Female and children under 18 years old <14 ng/L; Male <21 ng/L: Negative  Repeat testing should be performed if clinically indicated.   .  Female and children under 18 years old 14-50 ng/L; Male 21-50 ng/L:  Consistent with possible cardiac damage and possible increased clinical   risk. Serial measurements may help to assess extent of myocardial damage.   .  >50 ng/L: Consistent with cardiac damage, increased clinical risk and  myocardial infarction. Serial measurements may help assess extent of   myocardial damage.   .   NOTE: Children less than 1 year old may have higher baseline troponin   levels and results should be interpreted in conjunction with the overall   clinical context.   .  NOTE: Troponin I testing is performed using a different   testing methodology at [facility] than at other   [facility] hospitals. Direct result comparisons should only   be made within the same method.     03/20/2023 02:05 PM 23 0 - 20 ng/L Final     Comment:     .  Less than 99th percentile of normal range  cutoff-  Female and children under 18 years old <14 ng/L; Male <21 ng/L: Negative  Repeat testing should be performed if clinically indicated.   .  Female and children under 18 years old 14-50 ng/L; Male 21-50 ng/L:  Consistent with possible cardiac damage and possible increased clinical   risk. Serial measurements may help to assess extent of myocardial damage.   .  >50 ng/L: Consistent with cardiac damage, increased clinical risk and  myocardial infarction. Serial measurements may help assess extent of   myocardial damage.   .   NOTE: Children less than 1 year old may have higher baseline troponin   levels and results should be interpreted in conjunction with the overall   clinical context.   .  NOTE: Troponin I testing is performed using a different   testing methodology at [facility] than at other   [facility]. Direct result comparisons should only   be made within the same method.       BNP   Date/Time Value Ref Range Status   10/12/2023 08:17  0 - 99 pg/mL Final   10/05/2023 04:51  0 - 99 pg/mL Final     HGBA1C   Date/Time Value Ref Range Status   03/21/2023 05:30 AM 6.6 % Final     Comment:          Diagnosis of Diabetes-Adults   Non-Diabetic: < or = 5.6%   Increased risk for developing diabetes: 5.7-6.4%   Diagnostic of diabetes: > or = 6.5%  .       Monitoring of Diabetes                Age (y)     Therapeutic Goal (%)   Adults:          >18           <7.0   Pediatrics:    13-18           <7.5                   7-12           <8.0                   0- 6            7.5-8.5   American Diabetes Association. Diabetes Care 33(S1), Jan 2010.       VLDL   Date/Time Value Ref Range Status   03/21/2023 05:30 AM 8 0 - 40 mg/dL Final      Last I/O:  I/O last 3 completed shifts:  In: 480 (5.2 mL/kg) [P.O.:480]  Out: 1600 (17.3 mL/kg) [Urine:1600 (0.5 mL/kg/hr)]  Weight: 92.5 kg     Weight trend:    10/14/23 0700 92.5 kg (203 lb 14.8 oz)   10/13/23 0640 91.8 kg (202 lb 6.1 oz)   10/12/23  0412 90.7 kg (199 lb 15.3 oz)   10/11/23 0304 89.6 kg (197 lb 8.5 oz)   10/10/23 0334 86.1 kg (189 lb 13.1 oz)   10/09/23 1100 84.9 kg (187 lb 2.7 oz)   10/08/23 0940 91.3 kg (201 lb 4.5 oz)         Inpatient Medications:  Scheduled medications   Medication Dose Route Frequency    apixaban  2.5 mg oral BID    bumetanide  2 mg intravenous Once    metOLazone  2.5 mg oral Once    polyethylene glycol  17 g oral Daily    tamsulosin  0.4 mg oral BID     PRN medications   Medication     Continuous Medications   Medication Dose Last Rate    bumetanide  1 mg/hr 1 mg/hr (10/14/23 1922)       Physical Exam:    GEN: NAD  HEENT: ATNC,  anicteric, prominent JVD up to mandible straight up  CV: irr irr, gema 30-50s, systolic murmur heard throughout preordium, loudest at apex   Pulm: diminished  Abdomen: NTND  Ext: warm, edema up to thigh  Psych: appropriate         Assessment/Plan     Patient is an 88-year-old gentleman with history of HFpEF, atrial fibrillation, CKD, severe MR/TR.     Was around 2021 when he was discovered to have atrial fibrillation with additional MR and TR.  There have not really been attempts at rhythm control.  HAFSA on 5/8/2023 without left atrial appendage thrombus.  He has been compliant with his Eliquis 2.5 twice daily and has been taking it here otherwise.     #Severe MR  #Severe TR  #Heart failure with preserved ejection fraction  #Persistent atrial fibrillation with slow ventricular response (no prior attempts at sinus rhythm)  #thrombocytopenia     His weight continues to climb despite diuresis    Recommendation:  -continue diuresis- his weight is climbing  -Would recommend aggressive diuresis to euvolemia first, then followed by cardioversion.  This may help his heart rate, and even potentially his MR and TR which should be reevaluated after cardioversion  -Continue to monitor on telemetry  -Should he need a transvenous pacer or pacing system otherwise, will need to discuss implications of a tricuspid  clip and coordinate with EP   -continue AC with eliquis 2.5     Patient discussed with Dr. Farah     EP Consult Pager: [phone] (weekday 7AM-6PM and weekend 7AM-2PM) and other: [phone]          Kishore Martines MD

## 2023-10-16 ENCOUNTER — COMMITTEE REVIEW (OUTPATIENT)
Dept: CARDIOLOGY | Facility: HOSPITAL | Age: 88
End: 2023-10-16

## 2023-10-16 ENCOUNTER — APPOINTMENT (OUTPATIENT)
Dept: RADIOLOGY | Facility: HOSPITAL | Age: 88
DRG: 291 | End: 2023-10-16
Payer: MEDICARE

## 2023-10-16 LAB
ABO GROUP (TYPE) IN BLOOD: NORMAL
ALBUMIN MFR UR ELPH: 38.1 %
ALBUMIN SERPL BCP-MCNC: 2.9 G/DL (ref 3.4–5)
ALBUMIN: 3.1 G/DL (ref 3.4–5)
ALP SERPL-CCNC: 114 U/L (ref 33–136)
ALPHA 1 GLOBULIN: 0.3 G/DL (ref 0.2–0.6)
ALPHA 2 GLOBULIN: 0.5 G/DL (ref 0.4–1.1)
ALPHA1 GLOB MFR UR ELPH: 8.1 %
ALPHA2 GLOB MFR UR ELPH: 17.8 %
ALT SERPL W P-5'-P-CCNC: 19 U/L (ref 10–52)
ANION GAP BLDA CALCULATED.4IONS-SCNC: 10 MMO/L (ref 10–25)
ANION GAP SERPL CALC-SCNC: 15 MMOL/L (ref 10–20)
ANION GAP SERPL CALC-SCNC: 19 MMOL/L (ref 10–20)
ANTIBODY SCREEN: NORMAL
AST SERPL W P-5'-P-CCNC: 38 U/L (ref 9–39)
B-GLOBULIN MFR UR ELPH: 12.5 %
BASE EXCESS BLDA CALC-SCNC: 3 MMOL/L (ref -2–3)
BASOPHILS # BLD AUTO: 0.02 X10*3/UL (ref 0–0.1)
BASOPHILS NFR BLD AUTO: 0.2 %
BETA GLOBULIN: 0.8 G/DL (ref 0.5–1.2)
BILIRUB SERPL-MCNC: 2 MG/DL (ref 0–1.2)
BNP SERPL-MCNC: 231 PG/ML (ref 0–99)
BODY TEMPERATURE: 37 DEGREES CELSIUS
BUN SERPL-MCNC: 81 MG/DL (ref 6–23)
BUN SERPL-MCNC: 86 MG/DL (ref 6–23)
BURR CELLS BLD QL SMEAR: NORMAL
CA-I BLDA-SCNC: 1.17 MMOL/L (ref 1.1–1.33)
CALCIUM SERPL-MCNC: 9.5 MG/DL (ref 8.6–10.6)
CALCIUM SERPL-MCNC: 9.5 MG/DL (ref 8.6–10.6)
CHLORIDE BLDA-SCNC: 97 MMOL/L (ref 98–107)
CHLORIDE SERPL-SCNC: 97 MMOL/L (ref 98–107)
CHLORIDE SERPL-SCNC: 97 MMOL/L (ref 98–107)
CO2 SERPL-SCNC: 24 MMOL/L (ref 21–32)
CO2 SERPL-SCNC: 29 MMOL/L (ref 21–32)
CREAT SERPL-MCNC: 1.46 MG/DL (ref 0.5–1.3)
CREAT SERPL-MCNC: 1.71 MG/DL (ref 0.5–1.3)
EOSINOPHIL # BLD AUTO: 0.02 X10*3/UL (ref 0–0.4)
EOSINOPHIL NFR BLD AUTO: 0.2 %
ERYTHROCYTE [DISTWIDTH] IN BLOOD BY AUTOMATED COUNT: 22.2 % (ref 11.5–14.5)
ERYTHROCYTE [DISTWIDTH] IN BLOOD BY AUTOMATED COUNT: 22.7 % (ref 11.5–14.5)
GAMMA GLOB MFR UR ELPH: 23.5 %
GAMMA GLOBULIN: 2.1 G/DL (ref 0.5–1.4)
GFR SERPL CREATININE-BSD FRML MDRD: 38 ML/MIN/1.73M*2
GFR SERPL CREATININE-BSD FRML MDRD: 46 ML/MIN/1.73M*2
GLUCOSE BLD MANUAL STRIP-MCNC: 100 MG/DL (ref 74–99)
GLUCOSE BLDA-MCNC: 91 MG/DL (ref 74–99)
GLUCOSE SERPL-MCNC: 77 MG/DL (ref 74–99)
GLUCOSE SERPL-MCNC: 84 MG/DL (ref 74–99)
GLUCOSE: 122
HCO3 BLDA-SCNC: 27.9 MMOL/L (ref 22–26)
HCT VFR BLD AUTO: 38.8 % (ref 41–52)
HCT VFR BLD AUTO: 39.2 % (ref 41–52)
HCT VFR BLD EST: 41 % (ref 41–52)
HGB BLD-MCNC: 12.3 G/DL (ref 13.5–17.5)
HGB BLD-MCNC: 12.3 G/DL (ref 13.5–17.5)
HGB BLDA-MCNC: 13.5 G/DL (ref 13.5–17.5)
IMM GRANULOCYTES # BLD AUTO: 0.05 X10*3/UL (ref 0–0.5)
IMM GRANULOCYTES NFR BLD AUTO: 0.6 % (ref 0–0.9)
IMMUNOFIXATION COMMENT: ABNORMAL
INHALED O2 CONCENTRATION: 100 %
INR PPP: 4.1 (ref 0.9–1.1)
LACTATE BLDA-SCNC: 2.2 MMOL/L (ref 0.4–2)
LACTATE SERPL-SCNC: 2.5 MMOL/L (ref 0.4–2)
LYMPHOCYTES # BLD AUTO: 0.99 X10*3/UL (ref 0.8–3)
LYMPHOCYTES NFR BLD AUTO: 11.1 %
MAGNESIUM SERPL-MCNC: 2.56 MG/DL (ref 1.6–2.4)
MCH RBC QN AUTO: 23.4 PG (ref 26–34)
MCH RBC QN AUTO: 23.5 PG (ref 26–34)
MCHC RBC AUTO-ENTMCNC: 31.4 G/DL (ref 32–36)
MCHC RBC AUTO-ENTMCNC: 31.7 G/DL (ref 32–36)
MCV RBC AUTO: 74 FL (ref 80–100)
MCV RBC AUTO: 75 FL (ref 80–100)
MONOCYTES # BLD AUTO: 0.68 X10*3/UL (ref 0.05–0.8)
MONOCYTES NFR BLD AUTO: 7.6 %
NEUTROPHILS # BLD AUTO: 7.15 X10*3/UL (ref 1.6–5.5)
NEUTROPHILS NFR BLD AUTO: 80.3 %
NRBC BLD-RTO: 0 /100 WBCS (ref 0–0)
NRBC BLD-RTO: 0.2 /100 WBCS (ref 0–0)
OXYHGB MFR BLDA: 88.4 % (ref 94–98)
PATH REVIEW - SERUM IMMUNOFIXATION: ABNORMAL
PATH REVIEW-SERUM PROTEIN ELECTROPHORESIS: ABNORMAL
PATH REVIEW-URINE PROTEIN ELECTROPHORESIS: NORMAL
PCO2 BLDA: 43 MM HG (ref 38–42)
PH BLDA: 7.42 PH (ref 7.38–7.42)
PHOSPHATE SERPL-MCNC: 6.3 MG/DL (ref 2.5–4.9)
PLATELET # BLD AUTO: 52 X10*3/UL (ref 150–450)
PLATELET # BLD AUTO: 55 X10*3/UL (ref 150–450)
PMV BLD AUTO: ABNORMAL FL
PMV BLD AUTO: ABNORMAL FL
PO2 BLDA: 65 MM HG (ref 85–95)
POLYCHROMASIA BLD QL SMEAR: NORMAL
POTASSIUM BLDA-SCNC: 4.8 MMOL/L (ref 3.5–5.3)
POTASSIUM SERPL-SCNC: 4.1 MMOL/L (ref 3.5–5.3)
POTASSIUM SERPL-SCNC: 4.7 MMOL/L (ref 3.5–5.3)
PROT SERPL-MCNC: 6.3 G/DL (ref 6.4–8.2)
PROTEIN ELECTROPHORESIS COMMENT: ABNORMAL
PROTHROMBIN TIME: 46.4 SECONDS (ref 9.8–12.8)
RBC # BLD AUTO: 5.24 X10*6/UL (ref 4.5–5.9)
RBC # BLD AUTO: 5.26 X10*6/UL (ref 4.5–5.9)
RBC MORPH BLD: NORMAL
RH FACTOR (ANTIGEN D): NORMAL
SAO2 % BLDA: 90 % (ref 94–100)
SCHISTOCYTES BLD QL SMEAR: NORMAL
SODIUM BLDA-SCNC: 130 MMOL/L (ref 136–145)
SODIUM SERPL-SCNC: 136 MMOL/L (ref 136–145)
SODIUM SERPL-SCNC: 136 MMOL/L (ref 136–145)
URINE ELECTROPHORESIS COMMENT: NORMAL
WBC # BLD AUTO: 8 X10*3/UL (ref 4.4–11.3)
WBC # BLD AUTO: 8.9 X10*3/UL (ref 4.4–11.3)

## 2023-10-16 PROCEDURE — 3E043XZ INTRODUCTION OF VASOPRESSOR INTO CENTRAL VEIN, PERCUTANEOUS APPROACH: ICD-10-PCS | Performed by: INTERNAL MEDICINE

## 2023-10-16 PROCEDURE — 94799 UNLISTED PULMONARY SVC/PX: CPT

## 2023-10-16 PROCEDURE — 2500000004 HC RX 250 GENERAL PHARMACY W/ HCPCS (ALT 636 FOR OP/ED): Performed by: NURSE PRACTITIONER

## 2023-10-16 PROCEDURE — 76856 US EXAM PELVIC COMPLETE: CPT | Performed by: NURSE PRACTITIONER

## 2023-10-16 PROCEDURE — 94760 N-INVAS EAR/PLS OXIMETRY 1: CPT

## 2023-10-16 PROCEDURE — 93010 ELECTROCARDIOGRAM REPORT: CPT | Performed by: INTERNAL MEDICINE

## 2023-10-16 PROCEDURE — 85027 COMPLETE CBC AUTOMATED: CPT | Performed by: NURSE PRACTITIONER

## 2023-10-16 PROCEDURE — 2500000004 HC RX 250 GENERAL PHARMACY W/ HCPCS (ALT 636 FOR OP/ED): Performed by: INTERNAL MEDICINE

## 2023-10-16 PROCEDURE — 80048 BASIC METABOLIC PNL TOTAL CA: CPT | Mod: CMCLAB

## 2023-10-16 PROCEDURE — 1200000002 HC GENERAL ROOM WITH TELEMETRY DAILY

## 2023-10-16 PROCEDURE — 2500000001 HC RX 250 WO HCPCS SELF ADMINISTERED DRUGS (ALT 637 FOR MEDICARE OP)

## 2023-10-16 PROCEDURE — 94660 CPAP INITIATION&MGMT: CPT

## 2023-10-16 PROCEDURE — 2500000004 HC RX 250 GENERAL PHARMACY W/ HCPCS (ALT 636 FOR OP/ED): Performed by: STUDENT IN AN ORGANIZED HEALTH CARE EDUCATION/TRAINING PROGRAM

## 2023-10-16 PROCEDURE — 85025 COMPLETE CBC W/AUTO DIFF WBC: CPT | Mod: CMCLAB

## 2023-10-16 PROCEDURE — 2500000005 HC RX 250 GENERAL PHARMACY W/O HCPCS: Performed by: STUDENT IN AN ORGANIZED HEALTH CARE EDUCATION/TRAINING PROGRAM

## 2023-10-16 PROCEDURE — 82947 ASSAY GLUCOSE BLOOD QUANT: CPT | Mod: CMCLAB | Performed by: STUDENT IN AN ORGANIZED HEALTH CARE EDUCATION/TRAINING PROGRAM

## 2023-10-16 PROCEDURE — 83605 ASSAY OF LACTIC ACID: CPT | Mod: CMCLAB | Performed by: NURSE PRACTITIONER

## 2023-10-16 PROCEDURE — 71045 X-RAY EXAM CHEST 1 VIEW: CPT | Performed by: RADIOLOGY

## 2023-10-16 PROCEDURE — 2500000004 HC RX 250 GENERAL PHARMACY W/ HCPCS (ALT 636 FOR OP/ED)

## 2023-10-16 PROCEDURE — 83880 ASSAY OF NATRIURETIC PEPTIDE: CPT | Mod: CMCLAB | Performed by: NURSE PRACTITIONER

## 2023-10-16 PROCEDURE — 99232 SBSQ HOSP IP/OBS MODERATE 35: CPT | Performed by: INTERNAL MEDICINE

## 2023-10-16 PROCEDURE — 82947 ASSAY GLUCOSE BLOOD QUANT: CPT | Mod: CMCLAB

## 2023-10-16 PROCEDURE — 86900 BLOOD TYPING SEROLOGIC ABO: CPT

## 2023-10-16 PROCEDURE — 85610 PROTHROMBIN TIME: CPT | Mod: CMCLAB

## 2023-10-16 PROCEDURE — 36415 COLL VENOUS BLD VENIPUNCTURE: CPT | Mod: CMCLAB | Performed by: NURSE PRACTITIONER

## 2023-10-16 PROCEDURE — 36415 COLL VENOUS BLD VENIPUNCTURE: CPT | Performed by: INTERNAL MEDICINE

## 2023-10-16 PROCEDURE — 93005 ELECTROCARDIOGRAM TRACING: CPT

## 2023-10-16 PROCEDURE — 99223 1ST HOSP IP/OBS HIGH 75: CPT | Performed by: INTERNAL MEDICINE

## 2023-10-16 PROCEDURE — 71045 X-RAY EXAM CHEST 1 VIEW: CPT | Mod: FY

## 2023-10-16 PROCEDURE — 80053 COMPREHEN METABOLIC PANEL: CPT | Mod: CMCLAB | Performed by: NURSE PRACTITIONER

## 2023-10-16 PROCEDURE — 83735 ASSAY OF MAGNESIUM: CPT | Mod: CMCLAB

## 2023-10-16 PROCEDURE — 84100 ASSAY OF PHOSPHORUS: CPT | Mod: CMCLAB

## 2023-10-16 RX ORDER — BUMETANIDE 0.25 MG/ML
4 INJECTION INTRAMUSCULAR; INTRAVENOUS ONCE
Status: COMPLETED | OUTPATIENT
Start: 2023-10-16 | End: 2023-10-16

## 2023-10-16 RX ORDER — EPINEPHRINE HCL IN DEXTROSE 5% 4MG/250ML
.01-1 PLASTIC BAG, INJECTION (ML) INTRAVENOUS CONTINUOUS
Status: DISCONTINUED | OUTPATIENT
Start: 2023-10-16 | End: 2023-10-17

## 2023-10-16 RX ORDER — DOPAMINE HYDROCHLORIDE 160 MG/100ML
5 INJECTION, SOLUTION INTRAVENOUS CONTINUOUS
Status: DISCONTINUED | OUTPATIENT
Start: 2023-10-16 | End: 2023-10-17

## 2023-10-16 RX ORDER — HEPARIN SODIUM 10000 [USP'U]/100ML
0-4000 INJECTION, SOLUTION INTRAVENOUS CONTINUOUS
Status: DISCONTINUED | OUTPATIENT
Start: 2023-10-16 | End: 2023-10-17

## 2023-10-16 RX ORDER — LIDOCAINE HYDROCHLORIDE 10 MG/ML
1 INJECTION INFILTRATION; PERINEURAL ONCE
Status: DISCONTINUED | OUTPATIENT
Start: 2023-10-16 | End: 2023-10-16

## 2023-10-16 RX ORDER — HEPARIN SODIUM 5000 [USP'U]/ML
2000-4000 INJECTION, SOLUTION INTRAVENOUS; SUBCUTANEOUS EVERY 4 HOURS PRN
Status: DISCONTINUED | OUTPATIENT
Start: 2023-10-16 | End: 2023-10-17

## 2023-10-16 RX ORDER — EPINEPHRINE 1 MG/ML
INJECTION INTRAMUSCULAR; INTRAVENOUS; SUBCUTANEOUS
Status: COMPLETED
Start: 2023-10-16 | End: 2023-10-17

## 2023-10-16 RX ORDER — NOREPINEPHRINE BITARTRATE/D5W 8 MG/250ML
.01-1 PLASTIC BAG, INJECTION (ML) INTRAVENOUS CONTINUOUS
Status: DISCONTINUED | OUTPATIENT
Start: 2023-10-16 | End: 2023-10-17

## 2023-10-16 RX ADMIN — Medication 4 L/MIN: at 16:15

## 2023-10-16 RX ADMIN — HEPARIN SODIUM 12 UNITS/KG/HR: 10000 INJECTION, SOLUTION INTRAVENOUS at 20:38

## 2023-10-16 RX ADMIN — BUMETANIDE 2 MG/HR: 0.25 INJECTION, SOLUTION INTRAMUSCULAR; INTRAVENOUS at 15:54

## 2023-10-16 RX ADMIN — NOREPINEPHRINE BITARTRATE 0.02 MCG/KG/MIN: 8 INJECTION, SOLUTION INTRAVENOUS at 20:53

## 2023-10-16 RX ADMIN — DOPAMINE HYDROCHLORIDE IN DEXTROSE 5 MCG/KG/MIN: 1.6 INJECTION, SOLUTION INTRAVENOUS at 18:00

## 2023-10-16 RX ADMIN — BUMETANIDE 4 MG: 0.25 INJECTION, SOLUTION INTRAMUSCULAR; INTRAVENOUS at 10:54

## 2023-10-16 RX ADMIN — TAMSULOSIN HYDROCHLORIDE 0.4 MG: 0.4 CAPSULE ORAL at 08:18

## 2023-10-16 RX ADMIN — APIXABAN 2.5 MG: 2.5 TABLET, FILM COATED ORAL at 08:18

## 2023-10-16 RX ADMIN — BUMETANIDE 2 MG/HR: 0.25 INJECTION, SOLUTION INTRAMUSCULAR; INTRAVENOUS at 22:45

## 2023-10-16 RX ADMIN — Medication 40 L/MIN: at 21:26

## 2023-10-16 ASSESSMENT — COGNITIVE AND FUNCTIONAL STATUS - GENERAL
HELP NEEDED FOR BATHING: A LOT
TURNING FROM BACK TO SIDE WHILE IN FLAT BAD: A LOT
MOBILITY SCORE: 12
STANDING UP FROM CHAIR USING ARMS: A LOT
DRESSING REGULAR UPPER BODY CLOTHING: A LOT
DAILY ACTIVITIY SCORE: 14
PERSONAL GROOMING: A LOT
DRESSING REGULAR LOWER BODY CLOTHING: A LOT
MOVING TO AND FROM BED TO CHAIR: A LOT
MOVING FROM LYING ON BACK TO SITTING ON SIDE OF FLAT BED WITH BEDRAILS: A LITTLE
CLIMB 3 TO 5 STEPS WITH RAILING: TOTAL
TOILETING: A LOT
WALKING IN HOSPITAL ROOM: A LOT

## 2023-10-16 ASSESSMENT — PAIN SCALES - GENERAL
PAINLEVEL_OUTOF10: 0 - NO PAIN
PAINLEVEL_OUTOF10: 0 - NO PAIN

## 2023-10-16 ASSESSMENT — PAIN - FUNCTIONAL ASSESSMENT
PAIN_FUNCTIONAL_ASSESSMENT: 0-10
PAIN_FUNCTIONAL_ASSESSMENT: 0-10

## 2023-10-16 NOTE — COMMITTEE REVIEW
The case was reviewed in the heart team meeting in detail and the recommendations are as follows:  Proceed with DENIS while inpt

## 2023-10-16 NOTE — H&P
[facility]     Rajendra Chavis/[id number]    Admit Date: 10/8/2023  Hospital Length of Stay: 8   ICU Length of Stay: 1h   Primary Service:   Primary HF Cardiologist:   Referring:  SELIN  Rajendra Chavis is a 88 y.o.male who presented to the hospital on 10/5/23 with complaints of worsened sob, fatigue and abdominal/ leg edema. Apparently, patient was sent to ED  by his cardiologist for evaluation and admission to [facility] 10/6 for optimization of his heart disease prior to mitral valve surgery. Patient follows with Godfrey Mack, Jessy and Ramicone, last echo in 5/2023 with EF 55%, severe MR & TR, mild AR.     On RNF, patient continued to be diuretic resistant to increasing bumex drip and bolus IV. He has had low heart rates, but consistently staying in the 40's with altered cognitive status. Family at bedside and concerned of altered mental response. Per discussion among Dr Kurtz, structural and HF and will start dopamine for better cardiac output and transfer to HFICU for better optimization and monitoring possible RHC on Tuesday.  Family is aware of plan and in agreement.      Patient transferred to HFICU with code white team this evening. Upon transfer, patient oriented x2, very sluggish to answer questions, but follows commands, BLE extremities warm to touch. A fib, HR 45, BP 96/62. Multiple bruise on bilateral extremities and skin tears.     Cardiac tests:  EKG:  (05/8/2023): Atrial fibrillation, LAFB     Echo:  (03/21/2023): LVEF 55%, Right ventricular volume and pressure overload. Severely enlarged right ventricle.  The left atrium is moderate to severely dilated. The right atrium is severely dilated. Moderate aortic valve regurgitation. Moderate to severe mitral valve regurgitation. Severe tricuspid regurgitation visualized.  A bubble study using agitated saline was performed. Bubble study is positive. Moderately sized patent foramen ovale. Mild to moderately elevated right ventricular  systolic pressure.    Past Medical History:  He has a past medical history of Acute on chronic combined systolic and diastolic heart failure (CMS/[facility]) (10/06/2023), Anxiety and depression (10/05/2023), Aortic valve disorder (03/21/2023), Atrial fibrillation (CMS/[facility]) (03/29/2023), Benign prostate hyperplasia (10/05/2023), CHF (congestive heart failure) (CMS/[facility]), Edema of extremities (10/07/2023), Hypertensive heart disease with heart failure (CMS/[facility]) (03/29/2023), Incarcerated right inguinal hernia (10/07/2023), Irregular heartbeat (10/07/2023), Mitral valve disease (03/21/2023), Numerous skin moles (10/07/2023), Onychomycosis due to dermatophyte (10/07/2023), Pain in both feet (10/07/2023), Peripheral edema (10/07/2023), Personal history of other medical treatment, Right inguinal hernia (10/07/2023), Unspecified diastolic (congestive) heart failure (CMS/[facility]) (03/29/2023), Venous congestion (10/07/2023), Wounds, multiple open, lower extremity, unspecified laterality, sequela (10/07/2023), and Xerosis cutis (10/07/2023).     Past Surgical History:  He has a past surgical history that includes Other surgical history (09/16/2021).      Social History:  He reports that he has quit smoking. His smoking use included cigarettes. He has never used smokeless tobacco. No history on file for alcohol use and drug use.     Family History:  Family History   No family history on file.        Allergies:  Patient has no known allergies.    MEDICATIONS  Infusions:  bumetanide, Last Rate: 2 mg/hr (10/16/23 1554)  DOPamine, Last Rate: 5 mcg/kg/min (10/16/23 1800)  heparin      Scheduled:  [Held by provider] apixaban, 2.5 mg, BID  [MAR Hold] lidocaine, 1 mL, Once  [MAR Hold] polyethylene glycol, 17 g, Daily  [MAR Hold] tamsulosin, 0.4 mg, BID      PRN:  [MAR Hold] heparin, 2,000-4,000 Units, q4h PRN  oxygen, , Continuous PRN - O2/gases      Invasive Hemodynamics:    Most Recent Range Past 24hrs   BP (Art) 92/44 Arterial Line BP 1   Min: 92/44  Max: 92/44   MAP(Art) 61 mmHg Arterial Line MAP 1 (mmHg)   Min: 61 mmHg  Max: 61 mmHg   RA/CVP   No data recorded   PA   No data recorded   PA(mean)   No data recorded   PCWP   No data recorded   CO   No data recorded   CI   No data recorded   Mixed Venous   No data recorded   SVR    No data recorded   PVR   No data recorded         PHYSICAL EXAM:   Visit Vitals  /51   Pulse (!) 45   Temp 35.1 °C (95.2 °F)   Resp 13   Ht 1.829 m (6')   Wt 91.2 kg (201 lb 1 oz)   SpO2 94%   BMI 27.27 kg/m²   Smoking Status Former   BSA 2.15 m²     Wt Readings from Last 5 Encounters:   10/16/23 91.2 kg (201 lb 1 oz)   10/07/23 90.7 kg (200 lb)   06/19/23 89.8 kg (198 lb)   06/13/23 90.3 kg (199 lb)   04/24/23 88.5 kg (195 lb)     INTAKE/OUTPUT:  I/O last 3 completed shifts:  In: - (0 mL/kg)   Out: 1500 (16.4 mL/kg) [Urine:1500 (0.5 mL/kg/hr)]  Weight: 91.2 kg    Physical Exam  Constitutional:       Appearance: He is ill-appearing.   HENT:      Head: Normocephalic and atraumatic.      Mouth/Throat:      Mouth: Mucous membranes are moist.   Eyes:      Pupils: Pupils are equal, round, and reactive to light.   Cardiovascular:      Rate and Rhythm: Bradycardia present. Rhythm irregular.   Pulmonary:      Effort: Pulmonary effort is normal.      Breath sounds: Rhonchi present.   Musculoskeletal:         General: Swelling present.      Right lower leg: Edema present.      Left lower leg: Edema present.   Skin:     General: Skin is warm and dry.      Capillary Refill: Capillary refill takes less than 2 seconds.      Findings: Bruising present.   Neurological:      Mental Status: He is disoriented.       DATA:  CMP:  Recent Labs     03/21/23  0530 03/22/23  0526 03/23/23  0606 03/24/23  0542 03/26/23  0539 03/28/23  0540 03/28/23  0928 10/05/23  1651 10/06/23  2243 10/07/23  0905 10/08/23  0613 10/08/23  2119 10/09/23  2016 10/10/23  0238 10/10/23  1926 10/11/23  1857 10/12/23  2017 10/13/23  1740 10/14/23  1932  10/16/23  0618      < > 136 137   < > CANCELED   < > 135* 135* 137 137 139 139  --  139 133* 136 133* 132* 136   K 4.2   < > 3.8 3.5   < > CANCELED   < > 4.0 4.2 3.8 4.0 3.8 3.7  --  3.6 3.6 3.7 3.4* 4.4 4.1      < > 101 102   < > CANCELED   < > 98 99 101 100 101 100  --  100 95* 95* 95* 95* 97*   CO2 26   < > 26 27   < > CANCELED   < > 26 28 27 28 26 26  --  26 27 28 23 26 24   ANIONGAP 14   < > 13 12   < > CANCELED   < > 15 12 13 13 16 17  --  17 15 17 18 15 19   BUN 79*   < > 67* 56*   < > CANCELED   < > 77* 75* 72* 71* 65* 65*  --  62* 63* 64* 67* 75* 81*   CREATININE 1.51*   < > 1.46* 1.34*   < > CANCELED   < > 1.47* 1.51* 1.36* 1.61* 1.56* 1.48*  --  1.49* 1.49* 1.58* 1.45* 1.71* 1.71*   EGFR  --   --   --   --   --   --    < > 46* 44* 50* 41* 42* 45*  --  45* 45* 42* 46* 38* 38*   MG 2.55*  --  2.33 2.27  --  CANCELED  --  2.23 2.38 2.38  --   --   --  2.39  --   --  2.47*  --   --   --     < > = values in this interval not displayed.     Recent Labs     03/20/23  1405 04/26/23  0914 10/05/23  1651 10/06/23  2243 10/08/23  2119 10/09/23  2016 10/10/23  1926 10/11/23  1857 10/12/23  2017 10/13/23  1740 10/14/23  1932   ALBUMIN 3.7   < > 3.2* 3.0* 3.1* 2.9* 2.9* 3.2* 3.0* 2.8* 2.9*   ALT 26  --  14 15  --   --   --  20  --   --   --    AST 28  --  23 24  --   --   --  33  --   --   --    BILITOT 1.6*  --  1.6* 1.3*  --   --   --  1.9*  --   --   --    LIPASE  --   --  78  --   --   --   --   --   --   --   --     < > = values in this interval not displayed.     CBC:  Recent Labs     10/09/23  2016 10/10/23  1926 10/11/23  1856 10/12/23  2017 10/13/23  1740 10/14/23  1932 10/16/23  0619 10/16/23  1706   WBC 6.2 6.7 9.7 11.9* 8.3 9.5 8.0 8.9   HGB 11.4* 11.5* 11.9* 12.2* 11.7* 12.8* 12.3* 12.3*   HCT 37.5* 38.2* 38.7* 39.0* 37.7* 41.5 38.8* 39.2*   PLT 79* 77* 62* 76* 60* 71* 52* 55*   MCV 78* 77* 76* 74* 75* 75* 74* 75*     COAG:   Recent Labs     05/08/23  0743 07/20/23  0825 10/05/23  4473  "10/11/23  1857   INR 1.5* 2.3* 2.3* 2.2*     ABO: No results for input(s): \"ABO\" in the last 54119 hours.  HEME/ENDO:  Recent Labs     03/21/23  0530 10/11/23  1004   FERRITIN  --  65   IRONSAT  --  13*   HGBA1C 6.6*  --       CARDIAC:   Recent Labs     03/20/23  1405 03/21/23  0530 04/26/23  0914 06/20/23  0955 10/05/23  1651 10/06/23  2243 10/10/23  1926 10/12/23  2017 10/16/23  0618   LDH  --   --   --   --   --   --  201  --   --    TROPHS 23* 25*  --   --  22* 21*  --   --   --    *  --  640* 570* 506*  --   --  492* 231*     Recent Labs     05/08/23  0908   SO2MV 62   O2CMX 60.4     Prior to Admission Meds:  Medications Prior to Admission   Medication Sig Dispense Refill Last Dose    Eliquis 2.5 mg tablet Take 1 tablet (2.5 mg) by mouth 2 times a day.       potassium chloride CR 10 mEq ER tablet Take 1 tablet (10 mEq) by mouth once daily. Do not crush, chew, or split.       tamsulosin (Flomax) 0.4 mg 24 hr capsule Take 1 capsule (0.4 mg) by mouth 2 times a day.       torsemide (Demadex) 20 mg tablet Take 2 tablets (40 mg) by mouth once daily.       vit C,F-Vq-qitky-lutein-zeaxan (PreserVision AREDS-2) 250-90-40-1 mg capsule Take 1 capsule by mouth once daily.          ASSESSMENT AND PLAN:     Rajendra Chavis is a 88 y.o. male presenting with  PMHx of HFpEF (EF 50-55%), severe MR & TR, CKD 3, Afib on Eliquis, and R pretibial venous ulcer who presented to [facility] on 10/5/23 with complaints of worsened SOB, fatigue and abdominal/ leg edema. It was recommended by his HF Cardiologist to be admitted for optimization of volume status prior to scheduled DENIS scheduled on 10/19/23. He was initially started on Lasix gtt while in the [place] ED before being transferred to [facility].     On RNF, patient continued to be diuretic resistant to increasing bumex drip and bolus IV. He has had low heart rates, but consistently staying in the 40's with altered cognitive status. Family at bedside and concerned of altered " mental response. Per discussion among Dr Kurtz, structural and HF and will start dopamine for better cardiac output and transfer to HFICU for better optimization and monitoring possible RHC on Tuesday.  Family is aware of plan and in agreement.      Patient transferred to HFICU with code white team this evening. Upon transfer, patient oriented x2, very sluggish to answer questions, but follows commands, BLE extremities warm to touch. A fib, HR 45, BP 96/62. Multiple bruise on bilateral extremities and skin tears.     Neuro:  # Delayed speech  - patient exhibiting word slurring/trouble word finding on exam; although A&Ox2  - wife attributed to patient's current frustration  - family notes that patient tends to be forgetful/flustered medical settings, denies concerns for underlying  while at home   - Dr. Mack confirmed this is his observable baseline from previous office visits  - Dr. Mack recommending CT head if he has not received one previously  - original consulted OT consulted for MOCA, however will postpone MOCA assessment until patient is discharged/to be completed with [facility] OT as he may score falsely low due to anxiety while in hospital setting   - mumbling speech but A/Ox3 deferring head CT for now     # Anxiety. Depression, Acute pain   - Serial neuro and pain assessments   - PO Tylenol PRN for pain  - PT/OT Consult, OOB to chair  - CAM ICU score every shift  - Sleep/wake cycle normalization    # Physical Status  -Overweight: BMI: 27.27  -Age-related debility/bed confined     Cardiac:   # acute on chronic diastolic HF/HFpEF. LVEF 50-55%  # Severe MR/TR  - TTE (03/21/2023): LVEF 55%, Right ventricular volume and pressure overload. Severely enlarged right ventricle.  Moderate aortic valve regurgitation. Moderate to severe mitral valve regurgitation. Severe tricuspid regurgitation visualized.  A bubble study using agitated saline was performed. Bubble study is positive. Moderately sized patent foramen ovale.  Mild to moderately elevated right ventricular systolic pressure.  - HAFSA 5/8/23 EF 55%, LA moderately dilated, RA severely dilated, severe MR, severe TR, small PFO (10 bubbles), plaque visualized in descending & transverse aorta  - L/RHC 5/08/2023 No angiographically significant CAD in right-dominant circulation. Elevated filling/pulmonary pressures. Preserved CO/CI by assumed Francisco method.  - CXR in [place] ED 10/5 small to moderate right pleural effusion  - 10/15 CXR shows bilateral pleural effusions right > left   - 10/19 scheduled for MitraClip +/- tricuspid clip with Attizzani and Elgudin (hold Eliquis 3 days prior)  - Structural heart consulted, admitted prior to procedure to continue aggressive diuresis/optimization  - admit weight (10/16): 91.2 Kg   - admit BNP  - Diuretics as needed  - home torsemide 20mg on hold  - Start Dopamine gtt at 5 mcg/kg/min   - Will plan for RHC with leave in PAC to guide optimization   - Will update the Structural Heart Team Am, planned for DENIS on 10/19  - Daily standing weights, 2gm sodium diet, 2L fluid restriction, strict I&Os    # Atrial fibrillation in slow ventricular response  -Bradycardia, HR 30-40's, ventricular rhythm  - per Dr. Mack: rate is reasonably well controlled, do not think restoration of sinus rhythm will have any benefit. Given his fall risk, and nosebleeds we can consider him for Watchman procedure. We will defer this until his valves are addressed.  - Afib with SVR HR 30-40s on tele this morning, typical baseline 50-60s, asymptomatic  - EP consulted: would recommend aggressive diuresis to euvolemia first, then followed by cardioversion.  This may help his heart rate, and even potentially his MR and TR which should be reevaluated after cardioversion. Should he need a transvenous pacer or pacing system otherwise, will need to discuss implications of a tricuspid clip and coordinate with EP. continue AC with eliquis 2.5. consider amyloid work up (light chain  ratio 1.83, SPEP and UPEP pending)  - c/w Eliquis 2.5mg BID - 10/16 hold 3 days prior to mitral clip on Thursday 10/19 needs heparin bridge for possible cardioversion watch with low platlets  no initial bolus      #Electrolyte Disturbances  -Replace K, Mag as needed    Pulmonary:   -No Hx of pulmonary disease/Pulmonary Hypertension  -Monitor and maintain SpO2 > 92%    GI:  - Bowel regimen  - PPI     :  #CORRIE/CKD stage: 3   - baseline: 1.3-1.6  - Admit Cr: 1.47  - Cr trend 1.7  -I/Os  -avoid hypotension and nephrotoxic agents    BPH  - c/w home tamsulosin     Heme:  #Anemia in the setting of  - Labs: CBC, TIBC, ferritin, serum Fe, folate, B12     - C/w PO iron  - If %sat low, order venofer     # Coagulopathy due to on home Eliquis  - Will consider AC AM    # Thrombocytopenia.   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 While there was some mild elevation in k/l ratio, it was only minimal. Pending SPEP.   In order to better characterize the MDS we believe the patient needs a bone marrow biopsy. However, patient is scheduled for a mitral clip next week and we believe that the bmb can be done outpatient after the surgery.   - aspirin is not contraindicated as platelets are above 50K   ** family endorses significant craving of ice at home      Endo:  #DM  - Euglycemic  - hgbA1c     #Thyroid  -TSH, Free T4  -Repeat TSH with reflex pending     ID:  # Mild leukocytosis  - WBC trend: 9.5  (11.9, 9.7, 6.7, 6.2, 5.1, 6.0)  - patient remains afebrile  - dermatology to assess for concern of cellulitis of RUE +/- infection of R pretibial venous ulcer  - UA neg   -trend temps q4h    RUE  swelling  - 10/12 acute non tender, erythematous R arm swelling proximal to PIV. Luke warm to touch. PIV removed and replaced on LUE. ACE wrap applied to R arm, to remain elevated and PRN ice packs applied for swelling  - erythematous demarcation marked with skin marker to monitor for border expansion  - 10/13 continued RUE swelling/pitting edema with darkened erythema and newly formed fluid filled blister  - Suspicious for IV infiltrate as patient has been relatively non responsive to aggressive lasix gtt vs cellulitis vs thrombophlebitis   - RUE Venous duplex U/S negative for DVT  - consulted dermatology, appreciate recs  - cont ACE as previously rec    Right pretibial venous ulcer  PVD  - wound care followed patient as an outpatient and while inpatient at [facility]  - wound care consulted, recs ordered -- keep legs elevated to reduce edema. Float heels. Change RLE (ankle/shin) dressing q3 days using Medihoney (order from , oracle #[id number]), Adaptic (ie., Curity non-adherent strips), Aquacel Ag and Mepilex foam to secure. Apply lotion to dry periwound skin and LLE.   - next dressing change due 10/15  - per family: clindamycin 300mg 4x/day for 14 days to start a day prior to procedure (script given by OP wound care), SH agreeable to continue that plan  - dermatology to also assess ulcer while assess RUE (as above), appreciate recs       Feeding/fluids: 2g Na diet, 2L fluid restriction  Thromboprophylaxis: SCDs   VAP bundle: n/a  Ulcer prophylaxis: PPI  Glycemic control: n/a  Bowel care: Colace & miralax prn  Indwelling catheter: None    Lines:   PIVs   A line: 10/16   Gaetano    PT/OT:    Code Status: Full Code    Family Primary Contact/Next of Kin:    Dispo: Admit to HF ICU    A. -G. reviewed     Dr. Ricardo  aware of patient's transfer        DVT:  NUTRITION: Adult diet 2-3 grams sodium; 2000 mL fluid  EMERGENCY CONTACT: Extended Emergency Contact Information  Primary Emergency Contact: Sunita Chavis  Home Phone:  [phone]  Relation: Spouse  CODE STATUS: Full Code  DISPO:   FOLLOWUP:   Future Appointments   Date Time Provider Department [place]   11/17/2023  8:00 AM  RACHEL [id number] CARD1 [id number] [facility]   12/11/2023  9:20 AM Ramon Mack MD [id number] [place]   1/23/2024  1:15 PM Vinicius Jiménez MD [id number] [place]   10/18/2024  8:00 AM  RACHEL [id number] CARD1 [id number] [facility]         _________________________________________________  JENNA Anderson

## 2023-10-16 NOTE — CARE PLAN
The patient's goals for the shift include decreased SOB    The clinical goals for the shift include pat will deny SOB by end of shift    Over the shift, the patient did not make progress toward the following goals. Barriers to progression include continue to diurese with net output - 2 liters per 24 hours. Recommendations to address these barriers include continue strict I's and O's while diuresing patient..

## 2023-10-16 NOTE — NURSING NOTE
Code White called on Patient for worsening mentation , drop in Heart rate, low blood pressure and oxygen saturation. Patient Transferred to [facility] ICU .Report given to ICU nurse .

## 2023-10-16 NOTE — PROCEDURES
The patient was prepped and draped in the usual sterile manner using chlorhexidine scrub. 1% lidocaine was used to numb the region. The LEFT  radial artery was palpated and successfully cannulated on the first pass with ultrasound guided. Pulsatile, arterial blood was visualized and the artery was then threaded using the Seldinger technique and a catheter was then sutured into place. Good wave-form was obtained. The patient tolerated the procedure well without any immediate complications. The area was cleaned and Tegaderm was applied.

## 2023-10-16 NOTE — SIGNIFICANT EVENT
RAPID RESPONSE   10/16/23 1609   Onset Documentation   Rapid Response Initiated By RN;Physician   Pager Time 1609   Arrival Time 1615   Event End Time 1700   Primary Reason for Call Change in mental status;HR less than or equal to 40;SBP less than or equal to 90 mmHg;Staff concern     Rapid response called for assistance to escalate care to ICU. Pt hypotensive and bradycardic. See flow sheet. Bernice BATISTA  NP for HVI at the bedside and pt has been accepted to HFICU. Discussion with DACR if pt to have Dopamine initiated prior to transfer and plan to initiate in the ICU. Pt on 4L NC and pulse ox in the 90's and SBP in the low 90's and safe to transport to ICU. Pt transferred,monitored to the HFICU without incident.

## 2023-10-16 NOTE — SIGNIFICANT EVENT
Patient during the day continued to be diuretic resistant to increasing bumex drip and bolus IV. He has had low heart rates, but consistently staying in the 40's with altered cognitive status. Patient is glazed and answers questions slowly. Follows commands speech clear with slow responses.   Family at bedside and concerned of altered mental response.  Discussed with Dr Kurtz, structural and HF and will start dopamine for better cardiac output and transfer to HFICU for better optimization and monitoring possible RHC on Tuesday.  Family is aware of plan and in agreement.    Patient transferred to HFICU with code white team.  Report given to HFICU team.

## 2023-10-16 NOTE — CONSULTS
Consults  History Of Present Illness:    Rajendra Chavis is a 88 y.o. male presenting with  PMHx of HFpEF (EF 50-55%), severe MR & TR, CKD 3, Afib on Eliquis, and R pretibial venous ulcer who presented to [facility] on 10/5/23 with complaints of worsened SOB, fatigue and abdominal/ leg edema. It was recommended by his HF Cardiologist to be admitted for optimization of volume status prior to scheduled DENIS scheduled on 10/19/23. He was initially started on Lasix gtt while in the [place] ED before being transferred to [facility]. Diuresis was continued since admission. Net negative 7 liters was achieved since then. He is currently on Bumex gtt.      Last Recorded Vitals:  Vitals:    10/16/23 0022 10/16/23 0458 10/16/23 0747 10/16/23 1100   BP: 100/58 98/59 93/56 98/51   Pulse: 51 51 54 (!) 49   Resp: 17 18 18 18   Temp: 36.1 °C (97 °F) 36 °C (96.8 °F) 35.8 °C (96.4 °F) 35.4 °C (95.8 °F)   TempSrc:   Temporal Temporal   SpO2: 92% 93% 92% 95%   Weight:  91.2 kg (201 lb 1 oz)     Height:           Last Labs:  CBC - 10/16/2023:  6:19 AM  8.0 12.3 52    38.8      CMP - 10/16/2023:  6:18 AM  9.5 6.9 33 --- 1.9   4.8 2.9 20 108      PTT - 10/11/2023:  6:57 PM  2.2   24.9 38     Troponin I, High Sensitivity   Date/Time Value Ref Range Status   10/06/2023 10:43 PM 21 (H) 0 - 20 ng/L Final   10/05/2023 04:51 PM 22 (H) 0 - 20 ng/L Final     Troponin I   Date/Time Value Ref Range Status   03/21/2023 05:30 AM 25 (H) 0 - 20 ng/L Final     Comment:     .  Less than 99th percentile of normal range cutoff-  Female and children under 18 years old <14 ng/L; Male <21 ng/L: Negative  Repeat testing should be performed if clinically indicated.   .  Female and children under 18 years old 14-50 ng/L; Male 21-50 ng/L:  Consistent with possible cardiac damage and possible increased clinical   risk. Serial measurements may help to assess extent of myocardial damage.   .  >50 ng/L: Consistent with cardiac damage, increased clinical risk  and  myocardial infarction. Serial measurements may help assess extent of   myocardial damage.   .   NOTE: Children less than 1 year old may have higher baseline troponin   levels and results should be interpreted in conjunction with the overall   clinical context.   .  NOTE: Troponin I testing is performed using a different   testing methodology at [facility] than at other   [facility]. Direct result comparisons should only   be made within the same method.     03/20/2023 02:05 PM 23 (H) 0 - 20 ng/L Final     Comment:     .  Less than 99th percentile of normal range cutoff-  Female and children under 18 years old <14 ng/L; Male <21 ng/L: Negative  Repeat testing should be performed if clinically indicated.   .  Female and children under 18 years old 14-50 ng/L; Male 21-50 ng/L:  Consistent with possible cardiac damage and possible increased clinical   risk. Serial measurements may help to assess extent of myocardial damage.   .  >50 ng/L: Consistent with cardiac damage, increased clinical risk and  myocardial infarction. Serial measurements may help assess extent of   myocardial damage.   .   NOTE: Children less than 1 year old may have higher baseline troponin   levels and results should be interpreted in conjunction with the overall   clinical context.   .  NOTE: Troponin I testing is performed using a different   testing methodology at [facility] than at other   [facility]. Direct result comparisons should only   be made within the same method.       BNP   Date/Time Value Ref Range Status   10/16/2023 06:18  (H) 0 - 99 pg/mL Final   10/12/2023 08:17  (H) 0 - 99 pg/mL Final     Hemoglobin A1C   Date/Time Value Ref Range Status   03/21/2023 05:30 AM 6.6 (A) % Final     Comment:          Diagnosis of Diabetes-Adults   Non-Diabetic: < or = 5.6%   Increased risk for developing diabetes: 5.7-6.4%   Diagnostic of diabetes: > or = 6.5%  .       Monitoring of Diabetes                 Age (y)     Therapeutic Goal (%)   Adults:          >18           <7.0   Pediatrics:    13-18           <7.5                   7-12           <8.0                   0- 6            7.5-8.5   American Diabetes Association. Diabetes Care 33(S1), Jan 2010.       VLDL   Date/Time Value Ref Range Status   03/21/2023 05:30 AM 8 0 - 40 mg/dL Final      Last I/O:  I/O last 3 completed shifts:  In: - (0 mL/kg)   Out: 1500 (16.4 mL/kg) [Urine:1500 (0.5 mL/kg/hr)]  Weight: 91.2 kg     Past Cardiology Tests (Last 3 Years):  EKG:  (05/8/2023): Atrial fibrillation, LAFB    Echo:  (03/21/2023): LVEF 55%, Right ventricular volume and pressure overload. Severely enlarged right ventricle.  The left atrium is moderate to severely dilated. The right atrium is severely dilated. Moderate aortic valve regurgitation. Moderate to severe mitral valve regurgitation. Severe tricuspid regurgitation visualized.  A bubble study using agitated saline was performed. Bubble study is positive. Moderately sized patent foramen ovale. Mild to moderately elevated right ventricular systolic pressure.    Past Medical History:  He has a past medical history of Acute on chronic combined systolic and diastolic heart failure (CMS/HCC) (10/06/2023), Anxiety and depression (10/05/2023), Aortic valve disorder (03/21/2023), Atrial fibrillation (CMS/HCC) (03/29/2023), Benign prostate hyperplasia (10/05/2023), CHF (congestive heart failure) (CMS/[facility]), Edema of extremities (10/07/2023), Hypertensive heart disease with heart failure (CMS/HCC) (03/29/2023), Incarcerated right inguinal hernia (10/07/2023), Irregular heartbeat (10/07/2023), Mitral valve disease (03/21/2023), Numerous skin moles (10/07/2023), Onychomycosis due to dermatophyte (10/07/2023), Pain in both feet (10/07/2023), Peripheral edema (10/07/2023), Personal history of other medical treatment, Right inguinal hernia (10/07/2023), Unspecified diastolic (congestive) heart failure (CMS/HCC)  (03/29/2023), Venous congestion (10/07/2023), Wounds, multiple open, lower extremity, unspecified laterality, sequela (10/07/2023), and Xerosis cutis (10/07/2023).    Past Surgical History:  He has a past surgical history that includes Other surgical history (09/16/2021).      Social History:  He reports that he has quit smoking. His smoking use included cigarettes. He has never used smokeless tobacco. No history on file for alcohol use and drug use.    Family History:  No family history on file.     Allergies:  Patient has no known allergies.    Inpatient Medications:  Scheduled medications   Medication Dose Route Frequency    [Held by provider] apixaban  2.5 mg oral BID    [MAR Hold] lidocaine  1 mL infiltration Once    [MAR Hold] polyethylene glycol  17 g oral Daily    [MAR Hold] tamsulosin  0.4 mg oral BID     PRN medications   Medication    [MAR Hold] heparin     Continuous Medications   Medication Dose Last Rate    bumetanide  2 mg/hr 2 mg/hr (10/16/23 1554)    DOPamine  5 mcg/kg/min      heparin  0-4,000 Units/hr       Outpatient Medications:  Current Outpatient Medications   Medication Instructions    Eliquis 2.5 mg tablet 1 tablet, oral, 2 times daily    potassium chloride CR 10 mEq ER tablet 10 mEq, oral, Daily, Do not crush, chew, or split.    tamsulosin (FLOMAX) 0.4 mg, oral, 2 times daily    torsemide (DEMADEX) 40 mg, oral, Daily    vit C,I-Hh-qoico-lutein-zeaxan (PreserVision AREDS-2) 250-90-40-1 mg capsule 1 capsule, oral, Daily     Physical Exam:  Constitutional:       General: Frail, sick-looking, elderly without acute distress   HENT:      Head: Normocephalic and atraumatic. +JVD  Eyes:      Conjunctiva/sclera: Conjunctivae normal. Anicteric sclerae  Cardiovascular:      Rate and Rhythm: Bradycardic, irregular rhythm      Heart sounds: Normal heart sounds. No murmurs or gallups.      Extremities: 3+ pitting lower extremity edema  Pulmonary:      Breath sounds: Normal breath sounds bilaterally.  Decreased bibasal breaths sounds. No wheezing or rales.  Abdominal:      General: There is no distension. Active bowel sounds.      Palpations: Abdomen is soft and non-tender.  Skin:     General: Skin is warm and dry.     Assessment/Plan   89 y/o Male with HFpEF (EF 50-55%), severe MR & TR, CKD 3, Afib on Eliquis, and R pretibial venous ulcer who presented to [facility] on 10/5/23 with complaints of worsened SOB, fatigue and abdominal/ leg edema. Admitted for optimization of volume status prior to scheduled DENIS scheduled on 10/19/23. He has been on diuretic therapy since admission. Since admission, net I/O negative 7 liters. Creatinine has been rising.  Platelet count is declining.     # HFpEF (LVEF 50-55%)  # Severe MR/TR  # CKD Stage 3  # Atrial fibrillation in slow ventricular response    Recommendations:  -Start Dopamine gtt at 5 mcg/kg/min  -Schedule for RHC tomorrow.   Above recommendations discussed with primary team.    Patient was seen and evaluated at the bedside with HF attending.    Discussed with HF attending, Dr. NUBIA Ricardo.     Signed:    Silverio Lincoln MD  Heart Failure service

## 2023-10-16 NOTE — PROGRESS NOTES
Rajendra Chavis is a 88 y.o. male on day 8 of admission presenting with Acute heart failure with preserved ejection fraction (CMS/HCC).    Subjective   Appears fatigued and tired   Poor energy  Nutrition consult   Overnight net negative 1 liter  Net negative 7 liters since admit, weight remains similar   CXR shows bilateral plerual effusions right> left   Creatine holding increase bumex drip to 2 and give 4 mg bolus  Platlets 50 - nose bleeds intermittently discussed with EP needs no breaks in AC for possible cardioversion eliquis on hold today for 3 days prior to mitral clip +/_ tricuspid for the 19th (Thursday) will start low intensity heparin bridge and monitor close    Objective   More alert today  Slept well  Alert and oriented x 3  C/o about needing to go to bathroom all the time  Legs appear slightly less swollen Tubi  on warm thighs ankles slightly cooler   Abd soft  + JVD TR pulse waves noted   Diminished bases, room air   Rrr tele 40 - 50's slow afib     Last Recorded Vitals  Blood pressure 93/56, pulse 54, temperature 35.8 °C (96.4 °F), temperature source Temporal, resp. rate 18, height 1.829 m (6'), weight 91.2 kg (201 lb 1 oz), SpO2 92 %.  Intake/Output last 3 Shifts:  I/O last 3 completed shifts:  In: - (0 mL/kg)   Out: 1500 (16.4 mL/kg) [Urine:1500 (0.5 mL/kg/hr)]  Weight: 91.2 kg     Relevant Results  Scheduled medications  [Held by provider] apixaban, 2.5 mg, oral, BID  polyethylene glycol, 17 g, oral, Daily  tamsulosin, 0.4 mg, oral, BID      Continuous medications  bumetanide, 2 mg/hr, Last Rate: 2 mg/hr (10/16/23 1044)  heparin, 0-4,000 Units/hr      PRN medications  PRN medications: heparin     Results for orders placed or performed during the hospital encounter of 10/08/23 (from the past 24 hour(s))   POCT GLUCOSE   Result Value Ref Range    POCT Glucose 122 (H) 74 - 99 mg/dL   ECG 12 Lead   Result Value Ref Range    GLUCOSE 122    POCT GLUCOSE   Result Value Ref Range    POCT Glucose 105  (H) 74 - 99 mg/dL   POCT GLUCOSE   Result Value Ref Range    POCT Glucose 125 (H) 74 - 99 mg/dL   Basic metabolic panel   Result Value Ref Range    Glucose 84 74 - 99 mg/dL    Sodium 136 136 - 145 mmol/L    Potassium 4.1 3.5 - 5.3 mmol/L    Chloride 97 (L) 98 - 107 mmol/L    Bicarbonate 24 21 - 32 mmol/L    Anion Gap 19 10 - 20 mmol/L    Urea Nitrogen 81 (H) 6 - 23 mg/dL    Creatinine 1.71 (H) 0.50 - 1.30 mg/dL    eGFR 38 (L) >60 mL/min/1.73m*2    Calcium 9.5 8.6 - 10.6 mg/dL   B-type natriuretic peptide   Result Value Ref Range     (H) 0 - 99 pg/mL   CBC   Result Value Ref Range    WBC 8.0 4.4 - 11.3 x10*3/uL    nRBC 0.2 (H) 0.0 - 0.0 /100 WBCs    RBC 5.26 4.50 - 5.90 x10*6/uL    Hemoglobin 12.3 (L) 13.5 - 17.5 g/dL    Hematocrit 38.8 (L) 41.0 - 52.0 %    MCV 74 (L) 80 - 100 fL    MCH 23.4 (L) 26.0 - 34.0 pg    MCHC 31.7 (L) 32.0 - 36.0 g/dL    RDW 22.2 (H) 11.5 - 14.5 %    Platelets 52 (L) 150 - 450 x10*3/uL    MPV            Assessment/Plan   Principal Problem:    Acute heart failure with preserved ejection fraction (CMS/HCC)  Active Problems:    Chronic kidney disease, stage 3 unspecified (CMS/HCC)    Nonrheumatic mitral valve regurgitation    Tricuspid regurgitation    Chronic venous insufficiency    Venous stasis ulcer of calf (CMS/HCC)    Acute on chronic diastolic heart failure  Severe MR & TR  - follows with Dr. Mack  - HAFSA 5/8/23 EF 55%, LA moderately dilated, RA severely dilated, severe MR, severe TR, small PFO (10 bubbles), plaque visualized in descending & transverse aorta  - L/RHC 5/08/2023 No angiographically significant CAD in right-dominant circulation. Elevated filling/pulmonary pressures. Preserved CO/CI by assumed Francisco method.  - CXR in [place] [facility] 10/5 small to moderate right pleural effusion  - 10/15 CXR shows bilateral pleural effusions right > left   - 10/19 scheduled for MitraClip +/- tricuspid clip with Attizzani and Elgudin (hold Eliquis 3 days prior)  - Structural heart consulted,  admitted prior to procedure to continue aggressive diuresis/optimization  - admit BNP on 10/5: 508 (previously 570 in 6/2023) --> repeat 492 on 10/13 >> 10/15 repeat 231  - HS trop: 22 --> 21  - admit wt at [place] 90.7 kg  - today's wt 91.2, 92,  (91.8kg), 90.7)(89.6) (86.1) (84.9) (91.3)   - remains hypervolemic on exam, weight uptrending  - s/p daily rebolus IV lasix + metolazone 2.5mg + lasix gtt at 20mg/hr   - stable Cr with uptrending weight, made 2.4L over last 24 hr  - c/w bumex gtt at 1mg/hr + IV bumex 2mg bolus x1 today  - Yesterday metolazone and bumex bolus with drip today will increase drip to 2mg/hour and give 4mg bolus   - consider initiation of spironolactone  - consider initiation of SLGT2i following DENIS  - Daily standing weights, strict I/Os, 2g sodium diet, 2L fluid restriction  - home torsemide 20mg on hold     Atrial fibrillation with slow ventricular response   - per Dr. Mack: rate is reasonably well controlled, do not think restoration of sinus rhythm will have any benefit. Given his fall risk, and nosebleeds we can consider him for Watchman procedure. We will defer this until his valves are addressed.  - Afib with SVR HR 30-40s on tele this morning, typical baseline 50-60s, asymptomatic  - EP consulted: would recommend aggressive diuresis to euvolemia first, then followed by cardioversion.  This may help his heart rate, and even potentially his MR and TR which should be reevaluated after cardioversion. Should he need a transvenous pacer or pacing system otherwise, will need to discuss implications of a tricuspid clip and coordinate with EP. continue AC with eliquis 2.5. consider amyloid work up (light chain ratio 1.83, SPEP and UPEP pending)  - RN ambulated patient to chair to assess for physiologic HR elevation, HR did not reach >50bpm, noted that patient's HR did increase to 58bpm before returning to 40s early this morning when up to chair  - c/w Eliquis 2.5mg BID - 10/16 hold 3 days prior  to mitral clip on Thursday 10/19 needs heparin bridge for possible cardioversion watch with low platlets  no initial bolus       CKD III, stable   - baseline: 1.3-1.6  - Admit Cr: 1.47  - Cr trend 1.7, 1.71, 1.45 (1.58) (1.49 x2) (1.48) (1.56) (1.61) (1.36) (1.51)  - diuresis as above     RUE swelling  - 10/12 acute non tender, erythematous R arm swelling proximal to PIV. Luke warm to touch. PIV removed and replaced on LUE. ACE wrap applied to R arm, to remain elevated and PRN ice packs applied for swelling  - erythematous demarcation marked with skin marker to monitor for border expansion  - 10/13 continued RUE swelling/pitting edema with darkened erythema and newly formed fluid filled blister  - Suspicious for IV infiltrate as patient has been relatively non responsive to aggressive lasix gtt vs cellulitis vs thrombophlebitis   - RUE Venous duplex U/S negative for DVT  - consulted dermatology, appreciate recs  - cont ACE as previously rec     Right pretibial venous ulcer  PVD  - wound care followed patient as an outpatient and while inpatient at [place]  - wound care consulted, recs ordered -- keep legs elevated to reduce edema. Float heels. Change RLE (ankle/shin) dressing q3 days using Medihoney (order from , oracle #[id number]), Adaptic (ie., Curity non-adherent strips), Aquacel Ag and Mepilex foam to secure. Apply lotion to dry periwound skin and LLE.   - next dressing change due 10/15  - per family: clindamycin 300mg 4x/day for 14 days to start a day prior to procedure (script given by OP wound care), SH agreeable to continue that plan  - dermatology to also assess ulcer while assess RUE (as above), appreciate recs     Mild leukocytosis  - WBC trend: 9.5  (11.9, 9.7, 6.7, 6.2, 5.1, 6.0)  - patient remains afebrile  - dermatology to assess for concern of cellulitis of RUE +/- infection of R pretibial venous ulcer  - UA neg        Thrombocytopenia  - unclear baseline   - plts low 100s in 3/2023, then normal in  March and May   - admit plts 93  - today's plts 52, 71, 60  (76) (62) (77) (79) (82) (82)   - Heme consulted for thrombocytopenia -- requested labs resulted, recs appreciated Heme consulted for thrombocytopenia: 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 While there was some mild elevation in k/l ratio, it was only minimal. Pending SPEP.   In order to better characterize the MDS we believe the patient needs a bone marrow biopsy. However, patient is scheduled for a mitral clip next week and we believe that the bmb can be done outpatient after the surgery.   - aspirin is not contraindicated as platelets are above 50K   ** family endorses significant craving of ice at home      Delayed speech  - patient exhibiting word slurring/trouble word finding on exam; although A&Ox3  - wife attributed to patient's current frustration  - family notes that patient tends to be forgetful/flustered medical settings, denies concerns for underlying  while at home   - Dr. Mack confirmed this is his observable baseline from previous office visits  - Dr. Mack recommending CT head if he has not received one previously  - original consulted OT consulted for MOCA, however will postpone MOCA assessment until patient is discharged/to be completed with [facility] OT as he may score falsely low due to anxiety while in hospital setting   - mumbling speech but A/Ox3 deferring head CT for now      BPH  - c/w home tamsulosin         DVT ppx: Eliquis 2.5mg BID  Dispo   pending diuresis, MitraClip 10/19, +/- DCCV following diuresis    PT rec high intensity  OT to assess     Code Status  Full Code    Bernice Rosa, APRN-CNP    Seen and discussed with Dr Kurtz and EP team

## 2023-10-16 NOTE — PROGRESS NOTES
Subjective Data:  No events overnight  Pt feels okay but still gaining weight despite diuresis     Objective Data:  Last Recorded Vitals:  Vitals:    10/16/23 1100 10/16/23 1615 10/16/23 1645 10/16/23 1700   BP: 98/51 87/52 96/50 102/51   BP Location:   Left arm    Patient Position:   Sitting    Pulse: (!) 49 (!) 42 59 (!) 44   Resp: 18 18  16   Temp: 35.4 °C (95.8 °F)   35.1 °C (95.2 °F)   TempSrc: Temporal      SpO2: 95% 93%  94%   Weight:       Height:           Last Labs:  CBC - 10/16/2023:  6:19 AM  8.0 12.3 52    38.8      CMP - 10/16/2023:  6:18 AM  9.5 6.9 33 --- 1.9   4.8 2.9 20 108      PTT - 10/11/2023:  6:57 PM  2.2   24.9 38     TROPHS   Date/Time Value Ref Range Status   10/06/2023 10:43 PM 21 0 - 20 ng/L Final   10/05/2023 04:51 PM 22 0 - 20 ng/L Final   03/21/2023 05:30 AM 25 0 - 20 ng/L Final     Comment:     .  Less than 99th percentile of normal range cutoff-  Female and children under 18 years old <14 ng/L; Male <21 ng/L: Negative  Repeat testing should be performed if clinically indicated.   .  Female and children under 18 years old 14-50 ng/L; Male 21-50 ng/L:  Consistent with possible cardiac damage and possible increased clinical   risk. Serial measurements may help to assess extent of myocardial damage.   .  >50 ng/L: Consistent with cardiac damage, increased clinical risk and  myocardial infarction. Serial measurements may help assess extent of   myocardial damage.   .   NOTE: Children less than 1 year old may have higher baseline troponin   levels and results should be interpreted in conjunction with the overall   clinical context.   .  NOTE: Troponin I testing is performed using a different   testing methodology at [facility] than at other   [facility] hospitals. Direct result comparisons should only   be made within the same method.     03/20/2023 02:05 PM 23 0 - 20 ng/L Final     Comment:     .  Less than 99th percentile of normal range cutoff-  Female and children under 18  years old <14 ng/L; Male <21 ng/L: Negative  Repeat testing should be performed if clinically indicated.   .  Female and children under 18 years old 14-50 ng/L; Male 21-50 ng/L:  Consistent with possible cardiac damage and possible increased clinical   risk. Serial measurements may help to assess extent of myocardial damage.   .  >50 ng/L: Consistent with cardiac damage, increased clinical risk and  myocardial infarction. Serial measurements may help assess extent of   myocardial damage.   .   NOTE: Children less than 1 year old may have higher baseline troponin   levels and results should be interpreted in conjunction with the overall   clinical context.   .  NOTE: Troponin I testing is performed using a different   testing methodology at [facility] than at other   [facility]. Direct result comparisons should only   be made within the same method.       BNP   Date/Time Value Ref Range Status   10/16/2023 06:18  0 - 99 pg/mL Final   10/12/2023 08:17  0 - 99 pg/mL Final     HGBA1C   Date/Time Value Ref Range Status   03/21/2023 05:30 AM 6.6 % Final     Comment:          Diagnosis of Diabetes-Adults   Non-Diabetic: < or = 5.6%   Increased risk for developing diabetes: 5.7-6.4%   Diagnostic of diabetes: > or = 6.5%  .       Monitoring of Diabetes                Age (y)     Therapeutic Goal (%)   Adults:          >18           <7.0   Pediatrics:    13-18           <7.5                   7-12           <8.0                   0- 6            7.5-8.5   American Diabetes Association. Diabetes Care 33(S1), Jan 2010.       VLDL   Date/Time Value Ref Range Status   03/21/2023 05:30 AM 8 0 - 40 mg/dL Final      Last I/O:  I/O last 3 completed shifts:  In: - (0 mL/kg)   Out: 1500 (16.4 mL/kg) [Urine:1500 (0.5 mL/kg/hr)]  Weight: 91.2 kg     Weight trend:    10/14/23 0700 92.5 kg (203 lb 14.8 oz)   10/13/23 0640 91.8 kg (202 lb 6.1 oz)   10/12/23 0412 90.7 kg (199 lb 15.3 oz)   10/11/23 0304 89.6  kg (197 lb 8.5 oz)   10/10/23 0334 86.1 kg (189 lb 13.1 oz)   10/09/23 1100 84.9 kg (187 lb 2.7 oz)   10/08/23 0940 91.3 kg (201 lb 4.5 oz)         Inpatient Medications:  Scheduled medications   Medication Dose Route Frequency    [Held by provider] apixaban  2.5 mg oral BID    [MAR Hold] lidocaine  1 mL infiltration Once    [MAR Hold] polyethylene glycol  17 g oral Daily    [MAR Hold] tamsulosin  0.4 mg oral BID     PRN medications   Medication    [MAR Hold] heparin    oxygen     Continuous Medications   Medication Dose Last Rate    bumetanide  2 mg/hr 2 mg/hr (10/16/23 1554)    DOPamine  5 mcg/kg/min      heparin  0-4,000 Units/hr         Physical Exam:    GEN: NAD  HEENT: ATNC,  anicteric, prominent JVD up to mandible straight up  CV: irr irr, gema 30-50s, systolic murmur heard throughout preordium, loudest at apex   Pulm: diminished  Abdomen: NTND  Ext: warm, edema up to thigh  Psych: appropriate         Assessment/Plan     Patient is an 88-year-old gentleman with history of HFpEF, atrial fibrillation, CKD, severe MR/TR.     Was around 2021 when he was discovered to have atrial fibrillation with additional MR and TR.  There have not really been attempts at rhythm control.  HAFSA on 5/8/2023 without left atrial appendage thrombus.  He has been compliant with his Eliquis 2.5 twice daily and has been taking it here otherwise.     #Severe MR  #Severe TR  #Heart failure with preserved ejection fraction  #Persistent atrial fibrillation with slow ventricular response (no prior attempts at sinus rhythm)  #thrombocytopenia     His weight continues to climb despite diuresis  He is transferred to the HFICU on 10/16 PM due to worsening clinical stauts    Recommendation:  -continue diuresis  -agree with RHC consideration and transfer to HFICU  -per primary team initiating dopamine to trial increase in cardiac output, can monitor response and end organ dysfunction  -Should he need a transvenous pacer or pacing system  otherwise, will need to discuss implications of a tricuspid clip and coordinate with EP   -continue AC as able for afib     Patient discussed with Dr. Michel     EP Consult Pager: [phone] (weekday 7AM-6PM and weekend 7AM-2PM) and other: [phone]          Kishore Martines MD

## 2023-10-16 NOTE — SIGNIFICANT EVENT
RAPID RESPONSE - DACR    Rapid response called for bradycardia, hypotension, hypoxia. HR 30s-40s on tele, primarily wide complex with occasional narrow complex beats. Patient A&Ox0 from baseline A&Ox3. Overall c/f symptomatic high grade AV block - recommended dopamine gtt. HHVI at bedside managing. Transferred to HFICU.

## 2023-10-17 ENCOUNTER — APPOINTMENT (OUTPATIENT)
Dept: RADIOLOGY | Facility: HOSPITAL | Age: 88
DRG: 291 | End: 2023-10-17
Payer: MEDICARE

## 2023-10-17 ENCOUNTER — DOCUMENTATION (OUTPATIENT)
Dept: CARDIOLOGY | Facility: HOSPITAL | Age: 88
End: 2023-10-17

## 2023-10-17 VITALS
TEMPERATURE: 95.7 F | BODY MASS INDEX: 29.38 KG/M2 | WEIGHT: 216.93 LBS | OXYGEN SATURATION: 95 % | HEIGHT: 72 IN | HEART RATE: 62 BPM | DIASTOLIC BLOOD PRESSURE: 51 MMHG | SYSTOLIC BLOOD PRESSURE: 102 MMHG | RESPIRATION RATE: 23 BRPM

## 2023-10-17 LAB
ALBUMIN SERPL BCP-MCNC: 2.8 G/DL (ref 3.4–5)
ALBUMIN SERPL BCP-MCNC: 2.8 G/DL (ref 3.4–5)
ALP SERPL-CCNC: 110 U/L (ref 33–136)
ALP SERPL-CCNC: 111 U/L (ref 33–136)
ALT SERPL W P-5'-P-CCNC: 18 U/L (ref 10–52)
ALT SERPL W P-5'-P-CCNC: 19 U/L (ref 10–52)
ANION GAP BLDA CALCULATED.4IONS-SCNC: 12 MMO/L (ref 10–25)
ANION GAP BLDA CALCULATED.4IONS-SCNC: 16 MMO/L (ref 10–25)
ANION GAP BLDMV CALCULATED.4IONS-SCNC: 18 MMO/L (ref 10–25)
ANION GAP SERPL CALC-SCNC: 16 MMOL/L (ref 10–20)
ANION GAP SERPL CALC-SCNC: 24 MMOL/L (ref 10–20)
AST SERPL W P-5'-P-CCNC: 27 U/L (ref 9–39)
AST SERPL W P-5'-P-CCNC: 28 U/L (ref 9–39)
BASE EXCESS BLDA CALC-SCNC: -2.3 MMOL/L (ref -2–3)
BASE EXCESS BLDA CALC-SCNC: 1.3 MMOL/L (ref -2–3)
BASE EXCESS BLDMV CALC-SCNC: -3.9 MMOL/L (ref -2–3)
BASOPHILS # BLD AUTO: 0.02 X10*3/UL (ref 0–0.1)
BASOPHILS NFR BLD AUTO: 0.1 %
BILIRUB DIRECT SERPL-MCNC: 0.9 MG/DL (ref 0–0.3)
BILIRUB SERPL-MCNC: 2.1 MG/DL (ref 0–1.2)
BILIRUB SERPL-MCNC: 2.4 MG/DL (ref 0–1.2)
BODY TEMPERATURE: 37 DEGREES CELSIUS
BUN SERPL-MCNC: 89 MG/DL (ref 6–23)
BUN SERPL-MCNC: 90 MG/DL (ref 6–23)
BURR CELLS BLD QL SMEAR: NORMAL
CA-I BLDA-SCNC: 1.13 MMOL/L (ref 1.1–1.33)
CA-I BLDA-SCNC: 1.15 MMOL/L (ref 1.1–1.33)
CA-I BLDMV-SCNC: 1.14 MMOL/L (ref 1.1–1.33)
CALCIUM SERPL-MCNC: 9.4 MG/DL (ref 8.6–10.6)
CALCIUM SERPL-MCNC: 9.6 MG/DL (ref 8.6–10.6)
CHLORIDE BLD-SCNC: 92 MMOL/L (ref 98–107)
CHLORIDE BLDA-SCNC: 95 MMOL/L (ref 98–107)
CHLORIDE BLDA-SCNC: 96 MMOL/L (ref 98–107)
CHLORIDE SERPL-SCNC: 96 MMOL/L (ref 98–107)
CHLORIDE SERPL-SCNC: 97 MMOL/L (ref 98–107)
CO2 SERPL-SCNC: 21 MMOL/L (ref 21–32)
CO2 SERPL-SCNC: 25 MMOL/L (ref 21–32)
CREAT SERPL-MCNC: 1.73 MG/DL (ref 0.5–1.3)
CREAT SERPL-MCNC: 1.76 MG/DL (ref 0.5–1.3)
EOSINOPHIL # BLD AUTO: 0.01 X10*3/UL (ref 0–0.4)
EOSINOPHIL NFR BLD AUTO: 0.1 %
ERYTHROCYTE [DISTWIDTH] IN BLOOD BY AUTOMATED COUNT: 22.6 % (ref 11.5–14.5)
ERYTHROCYTE [DISTWIDTH] IN BLOOD BY AUTOMATED COUNT: 22.6 % (ref 11.5–14.5)
GFR SERPL CREATININE-BSD FRML MDRD: 37 ML/MIN/1.73M*2
GFR SERPL CREATININE-BSD FRML MDRD: 38 ML/MIN/1.73M*2
GLUCOSE BLD-MCNC: 161 MG/DL (ref 74–99)
GLUCOSE BLDA-MCNC: 141 MG/DL (ref 74–99)
GLUCOSE BLDA-MCNC: 87 MG/DL (ref 74–99)
GLUCOSE SERPL-MCNC: 82 MG/DL (ref 74–99)
GLUCOSE SERPL-MCNC: 90 MG/DL (ref 74–99)
HCO3 BLDA-SCNC: 21.2 MMOL/L (ref 22–26)
HCO3 BLDA-SCNC: 25 MMOL/L (ref 22–26)
HCO3 BLDMV-SCNC: 21.6 MMOL/L (ref 22–26)
HCT VFR BLD AUTO: 40.2 % (ref 41–52)
HCT VFR BLD AUTO: 40.2 % (ref 41–52)
HCT VFR BLD EST: 39 % (ref 41–52)
HCT VFR BLD EST: 40 % (ref 41–52)
HCT VFR BLD EST: 40 % (ref 41–52)
HGB BLD-MCNC: 12.5 G/DL (ref 13.5–17.5)
HGB BLD-MCNC: 12.5 G/DL (ref 13.5–17.5)
HGB BLDA-MCNC: 13.1 G/DL (ref 13.5–17.5)
HGB BLDA-MCNC: 13.4 G/DL (ref 13.5–17.5)
HGB BLDMV-MCNC: 13.3 G/DL (ref 13.5–17.5)
IMM GRANULOCYTES # BLD AUTO: 0.08 X10*3/UL (ref 0–0.5)
IMM GRANULOCYTES NFR BLD AUTO: 0.4 % (ref 0–0.9)
INHALED O2 CONCENTRATION: 0 %
INHALED O2 CONCENTRATION: 57 %
INHALED O2 CONCENTRATION: 70 %
INR PPP: 5 (ref 0.9–1.1)
LACTATE BLDA-SCNC: 2.9 MMOL/L (ref 0.4–2)
LACTATE BLDA-SCNC: 5.6 MMOL/L (ref 0.4–2)
LACTATE BLDMV-SCNC: 7.4 MMOL/L (ref 0.4–2)
LACTATE SERPL-SCNC: 1.9 MMOL/L (ref 0.4–2)
LYMPHOCYTES # BLD AUTO: 1.34 X10*3/UL (ref 0.8–3)
LYMPHOCYTES NFR BLD AUTO: 6.8 %
MCH RBC QN AUTO: 23.8 PG (ref 26–34)
MCH RBC QN AUTO: 23.8 PG (ref 26–34)
MCHC RBC AUTO-ENTMCNC: 31.1 G/DL (ref 32–36)
MCHC RBC AUTO-ENTMCNC: 31.1 G/DL (ref 32–36)
MCV RBC AUTO: 76 FL (ref 80–100)
MCV RBC AUTO: 76 FL (ref 80–100)
MONOCYTES # BLD AUTO: 1.19 X10*3/UL (ref 0.05–0.8)
MONOCYTES NFR BLD AUTO: 6 %
MRSA DNA SPEC QL NAA+PROBE: NOT DETECTED
NEUTROPHILS # BLD AUTO: 17.05 X10*3/UL (ref 1.6–5.5)
NEUTROPHILS NFR BLD AUTO: 86.6 %
NRBC BLD-RTO: 0.2 /100 WBCS (ref 0–0)
NRBC BLD-RTO: 0.2 /100 WBCS (ref 0–0)
OXYHGB MFR BLDA: 85.3 % (ref 94–98)
OXYHGB MFR BLDA: 88.8 % (ref 94–98)
OXYHGB MFR BLDMV: 54.7 % (ref 45–75)
PCO2 BLDA: 32 MM HG (ref 38–42)
PCO2 BLDA: 36 MM HG (ref 38–42)
PCO2 BLDMV: 40 MM HG (ref 41–51)
PH BLDA: 7.43 PH (ref 7.38–7.42)
PH BLDA: 7.45 PH (ref 7.38–7.42)
PH BLDMV: 7.34 PH (ref 7.33–7.43)
PLATELET # BLD AUTO: 98 X10*3/UL (ref 150–450)
PLATELET # BLD AUTO: 98 X10*3/UL (ref 150–450)
PMV BLD AUTO: ABNORMAL FL
PMV BLD AUTO: ABNORMAL FL
PO2 BLDA: 59 MM HG (ref 85–95)
PO2 BLDA: 65 MM HG (ref 85–95)
PO2 BLDMV: 41 MM HG (ref 35–45)
POLYCHROMASIA BLD QL SMEAR: NORMAL
POTASSIUM BLDA-SCNC: 3.7 MMOL/L (ref 3.5–5.3)
POTASSIUM BLDA-SCNC: 3.9 MMOL/L (ref 3.5–5.3)
POTASSIUM BLDMV-SCNC: 4 MMOL/L (ref 3.5–5.3)
POTASSIUM SERPL-SCNC: 3.8 MMOL/L (ref 3.5–5.3)
POTASSIUM SERPL-SCNC: 3.9 MMOL/L (ref 3.5–5.3)
PROCALCITONIN SERPL-MCNC: 0.19 NG/ML
PROT SERPL-MCNC: 6.4 G/DL (ref 6.4–8.2)
PROT SERPL-MCNC: 6.6 G/DL (ref 6.4–8.2)
PROTHROMBIN TIME: 57 SECONDS (ref 9.8–12.8)
RBC # BLD AUTO: 5.26 X10*6/UL (ref 4.5–5.9)
RBC # BLD AUTO: 5.26 X10*6/UL (ref 4.5–5.9)
RBC MORPH BLD: NORMAL
SAO2 % BLDA: 87 % (ref 94–100)
SAO2 % BLDA: 91 % (ref 94–100)
SAO2 % BLDMV: 56 % (ref 45–75)
SODIUM BLDA-SCNC: 128 MMOL/L (ref 136–145)
SODIUM BLDA-SCNC: 129 MMOL/L (ref 136–145)
SODIUM BLDMV-SCNC: 128 MMOL/L (ref 136–145)
SODIUM SERPL-SCNC: 134 MMOL/L (ref 136–145)
SODIUM SERPL-SCNC: 137 MMOL/L (ref 136–145)
TARGETS BLD QL SMEAR: NORMAL
UFH PPP CHRO-ACNC: >2 IU/ML
UFH PPP CHRO-ACNC: >2 IU/ML
WBC # BLD AUTO: 19 X10*3/UL (ref 4.4–11.3)
WBC # BLD AUTO: 19 X10*3/UL (ref 4.4–11.3)

## 2023-10-17 PROCEDURE — 99291 CRITICAL CARE FIRST HOUR: CPT | Performed by: INTERNAL MEDICINE

## 2023-10-17 PROCEDURE — 84145 PROCALCITONIN (PCT): CPT | Performed by: STUDENT IN AN ORGANIZED HEALTH CARE EDUCATION/TRAINING PROGRAM

## 2023-10-17 PROCEDURE — 2500000004 HC RX 250 GENERAL PHARMACY W/ HCPCS (ALT 636 FOR OP/ED): Performed by: STUDENT IN AN ORGANIZED HEALTH CARE EDUCATION/TRAINING PROGRAM

## 2023-10-17 PROCEDURE — 82248 BILIRUBIN DIRECT: CPT | Mod: CMCLAB | Performed by: STUDENT IN AN ORGANIZED HEALTH CARE EDUCATION/TRAINING PROGRAM

## 2023-10-17 PROCEDURE — 82805 BLOOD GASES W/O2 SATURATION: CPT | Mod: CMCLAB | Performed by: NURSE PRACTITIONER

## 2023-10-17 PROCEDURE — 85520 HEPARIN ASSAY: CPT | Mod: CMCLAB | Performed by: STUDENT IN AN ORGANIZED HEALTH CARE EDUCATION/TRAINING PROGRAM

## 2023-10-17 PROCEDURE — 99223 1ST HOSP IP/OBS HIGH 75: CPT

## 2023-10-17 PROCEDURE — 94660 CPAP INITIATION&MGMT: CPT

## 2023-10-17 PROCEDURE — 37799 UNLISTED PX VASCULAR SURGERY: CPT | Mod: CMCLAB | Performed by: STUDENT IN AN ORGANIZED HEALTH CARE EDUCATION/TRAINING PROGRAM

## 2023-10-17 PROCEDURE — 87075 CULTR BACTERIA EXCEPT BLOOD: CPT | Performed by: STUDENT IN AN ORGANIZED HEALTH CARE EDUCATION/TRAINING PROGRAM

## 2023-10-17 PROCEDURE — 2500000004 HC RX 250 GENERAL PHARMACY W/ HCPCS (ALT 636 FOR OP/ED)

## 2023-10-17 PROCEDURE — 99498 ADVNCD CARE PLAN ADDL 30 MIN: CPT

## 2023-10-17 PROCEDURE — 71045 X-RAY EXAM CHEST 1 VIEW: CPT | Mod: FY

## 2023-10-17 PROCEDURE — 85027 COMPLETE CBC AUTOMATED: CPT | Mod: CMCLAB | Performed by: STUDENT IN AN ORGANIZED HEALTH CARE EDUCATION/TRAINING PROGRAM

## 2023-10-17 PROCEDURE — 87641 MR-STAPH DNA AMP PROBE: CPT | Mod: CMCLAB

## 2023-10-17 PROCEDURE — 1200000002 HC GENERAL ROOM WITH TELEMETRY DAILY

## 2023-10-17 PROCEDURE — 82805 BLOOD GASES W/O2 SATURATION: CPT | Mod: CMCLAB

## 2023-10-17 PROCEDURE — 99231 SBSQ HOSP IP/OBS SF/LOW 25: CPT | Performed by: NURSE PRACTITIONER

## 2023-10-17 PROCEDURE — 36415 COLL VENOUS BLD VENIPUNCTURE: CPT | Mod: CMCLAB | Performed by: STUDENT IN AN ORGANIZED HEALTH CARE EDUCATION/TRAINING PROGRAM

## 2023-10-17 PROCEDURE — A4217 STERILE WATER/SALINE, 500 ML: HCPCS

## 2023-10-17 PROCEDURE — 71045 X-RAY EXAM CHEST 1 VIEW: CPT | Performed by: RADIOLOGY

## 2023-10-17 PROCEDURE — 85025 COMPLETE CBC W/AUTO DIFF WBC: CPT | Mod: CMCLAB | Performed by: STUDENT IN AN ORGANIZED HEALTH CARE EDUCATION/TRAINING PROGRAM

## 2023-10-17 PROCEDURE — 99497 ADVNCD CARE PLAN 30 MIN: CPT

## 2023-10-17 PROCEDURE — 84132 ASSAY OF SERUM POTASSIUM: CPT | Mod: CMCLAB | Performed by: STUDENT IN AN ORGANIZED HEALTH CARE EDUCATION/TRAINING PROGRAM

## 2023-10-17 PROCEDURE — 2500000005 HC RX 250 GENERAL PHARMACY W/O HCPCS

## 2023-10-17 PROCEDURE — 83605 ASSAY OF LACTIC ACID: CPT | Performed by: STUDENT IN AN ORGANIZED HEALTH CARE EDUCATION/TRAINING PROGRAM

## 2023-10-17 PROCEDURE — 85025 COMPLETE CBC W/AUTO DIFF WBC: CPT | Performed by: STUDENT IN AN ORGANIZED HEALTH CARE EDUCATION/TRAINING PROGRAM

## 2023-10-17 PROCEDURE — 80048 BASIC METABOLIC PNL TOTAL CA: CPT | Mod: CMCLAB | Performed by: STUDENT IN AN ORGANIZED HEALTH CARE EDUCATION/TRAINING PROGRAM

## 2023-10-17 PROCEDURE — 84295 ASSAY OF SERUM SODIUM: CPT | Mod: CMCLAB | Performed by: STUDENT IN AN ORGANIZED HEALTH CARE EDUCATION/TRAINING PROGRAM

## 2023-10-17 PROCEDURE — 02HQ32Z INSERTION OF MONITORING DEVICE INTO RIGHT PULMONARY ARTERY, PERCUTANEOUS APPROACH: ICD-10-PCS | Performed by: NURSE PRACTITIONER

## 2023-10-17 PROCEDURE — 2500000004 HC RX 250 GENERAL PHARMACY W/ HCPCS (ALT 636 FOR OP/ED): Performed by: NURSE PRACTITIONER

## 2023-10-17 PROCEDURE — 85610 PROTHROMBIN TIME: CPT | Mod: CMCLAB | Performed by: STUDENT IN AN ORGANIZED HEALTH CARE EDUCATION/TRAINING PROGRAM

## 2023-10-17 RX ORDER — LORAZEPAM 2 MG/ML
0.5 INJECTION INTRAMUSCULAR EVERY 4 HOURS PRN
Status: DISCONTINUED | OUTPATIENT
Start: 2023-10-17 | End: 2023-10-18 | Stop reason: HOSPADM

## 2023-10-17 RX ORDER — FENTANYL CITRATE-0.9 % NACL/PF 10 MCG/ML
25-200 PLASTIC BAG, INJECTION (ML) INTRAVENOUS CONTINUOUS
Status: DISCONTINUED | OUTPATIENT
Start: 2023-10-17 | End: 2023-10-18 | Stop reason: HOSPADM

## 2023-10-17 RX ORDER — FENTANYL CITRATE-0.9 % NACL/PF 10 MCG/ML
PLASTIC BAG, INJECTION (ML) INTRAVENOUS
Status: COMPLETED
Start: 2023-10-17 | End: 2023-10-17

## 2023-10-17 RX ORDER — VANCOMYCIN HYDROCHLORIDE 1 G/200ML
1000 INJECTION, SOLUTION INTRAVENOUS EVERY 24 HOURS
Status: DISCONTINUED | OUTPATIENT
Start: 2023-10-17 | End: 2023-10-17

## 2023-10-17 RX ORDER — PANTOPRAZOLE SODIUM 40 MG/10ML
80 INJECTION, POWDER, LYOPHILIZED, FOR SOLUTION INTRAVENOUS ONCE
Status: DISCONTINUED | OUTPATIENT
Start: 2023-10-17 | End: 2023-10-17

## 2023-10-17 RX ORDER — EPINEPHRINE 1 MG/ML
INJECTION INTRAMUSCULAR; INTRAVENOUS; SUBCUTANEOUS
Status: COMPLETED
Start: 2023-10-17 | End: 2023-10-17

## 2023-10-17 RX ORDER — GLYCOPYRROLATE 0.2 MG/ML
0.2 INJECTION INTRAMUSCULAR; INTRAVENOUS EVERY 4 HOURS PRN
Status: DISCONTINUED | OUTPATIENT
Start: 2023-10-17 | End: 2023-10-18 | Stop reason: HOSPADM

## 2023-10-17 RX ORDER — LIDOCAINE HYDROCHLORIDE 20 MG/ML
INJECTION, SOLUTION INFILTRATION; PERINEURAL
Status: COMPLETED
Start: 2023-10-17 | End: 2023-10-17

## 2023-10-17 RX ORDER — PANTOPRAZOLE SODIUM 40 MG/10ML
40 INJECTION, POWDER, LYOPHILIZED, FOR SOLUTION INTRAVENOUS EVERY 12 HOURS
Status: DISCONTINUED | OUTPATIENT
Start: 2023-10-18 | End: 2023-10-17

## 2023-10-17 RX ADMIN — LIDOCAINE HYDROCHLORIDE: 20 INJECTION, SOLUTION INFILTRATION; PERINEURAL at 08:45

## 2023-10-17 RX ADMIN — BUMETANIDE 2 MG/HR: 0.25 INJECTION, SOLUTION INTRAMUSCULAR; INTRAVENOUS at 07:28

## 2023-10-17 RX ADMIN — VANCOMYCIN HYDROCHLORIDE 1000 MG: 1 INJECTION, SOLUTION INTRAVENOUS at 09:00

## 2023-10-17 RX ADMIN — Medication 50 MCG/HR: at 16:30

## 2023-10-17 RX ADMIN — Medication 0.14 MCG/KG/MIN: at 13:36

## 2023-10-17 RX ADMIN — GLYCOPYRROLATE 0.2 MG: 1 INJECTION INTRAMUSCULAR; INTRAVENOUS at 17:26

## 2023-10-17 RX ADMIN — EPINEPHRINE 30 MG: 1 INJECTION INTRAMUSCULAR; INTRAVENOUS; SUBCUTANEOUS at 03:41

## 2023-10-17 RX ADMIN — VASOPRESSIN 20 UNITS: 0.2 INJECTION INTRAVENOUS at 09:30

## 2023-10-17 RX ADMIN — PIPERACILLIN SODIUM AND TAZOBACTAM SODIUM 2.25 G: 2; .25 INJECTION, SOLUTION INTRAVENOUS at 10:42

## 2023-10-17 RX ADMIN — PIPERACILLIN SODIUM AND TAZOBACTAM SODIUM 2.25 G: 2; .25 INJECTION, SOLUTION INTRAVENOUS at 05:35

## 2023-10-17 RX ADMIN — EPINEPHRINE 4 MG: 1 INJECTION INTRAMUSCULAR; INTRAVENOUS; SUBCUTANEOUS at 13:45

## 2023-10-17 RX ADMIN — HYDROCORTISONE SODIUM SUCCINATE 50 MG: 100 INJECTION, POWDER, FOR SOLUTION INTRAMUSCULAR; INTRAVENOUS at 09:51

## 2023-10-17 RX ADMIN — VANCOMYCIN HYDROCHLORIDE 1500 MG: 5 INJECTION, POWDER, LYOPHILIZED, FOR SOLUTION INTRAVENOUS at 07:28

## 2023-10-17 ASSESSMENT — RESPIRATORY DISTRESS OBSERVATION SCALE (RDOS)
LOOK OF FEAR: 0 - NONE
HEART RATE PER MINUTE: 0 - <90 BEATS
HEART RATE PER MINUTE: 0 - <90 BEATS
RESPIRATORY RATE PER MINUTE: 0 - <19 BREATHS
PARADOXICAL BREATHING PATTERN: 0 - NONE
INVOLUNTARY NASAL FLARING: 0 - NONE
HEART RATE PER MINUTE: 0 - <90 BEATS
RDOS TOTAL SCORE: 6
RESTLESS NONPURPOSEFUL MOVEMENTS: 0 - NONE
PARADOXICAL BREATHING PATTERN: 0 - NONE
LOOK OF FEAR: 0 - NONE
INVOLUNTARY NASAL FLARING: 0 - NONE
RDOS TOTAL SCORE: 0
RESPIRATORY RATE PER MINUTE: 1 - 19-30 BREATHS
LOOK OF FEAR: 0 - NONE
INVOLUNTARY NASAL FLARING: 0 - NONE
GRUNTING AT END OF EXPIRATION: 0 - NONE
HEART RATE PER MINUTE: 0 - <90 BEATS
RESTLESS NONPURPOSEFUL MOVEMENTS: 0 - NONE
ACCESSORY MUSCLE RISE IN CLAVICLE DURING INSPIRATION: 1 - SLIGHT RISE
HEART RATE PER MINUTE: 0 - <90 BEATS
RESPIRATORY RATE PER MINUTE: 0 - <19 BREATHS
PARADOXICAL BREATHING PATTERN: 0 - NONE
GRUNTING AT END OF EXPIRATION: 0 - NONE
LOOK OF FEAR: 0 - NONE
ACCESSORY MUSCLE RISE IN CLAVICLE DURING INSPIRATION: 1 - SLIGHT RISE
LOOK OF FEAR: 0 - NONE
INVOLUNTARY NASAL FLARING: 0 - NONE
RDOS TOTAL SCORE: 0
RESPIRATORY RATE PER MINUTE: 0 - <19 BREATHS
GRUNTING AT END OF EXPIRATION: 0 - NONE
ACCESSORY MUSCLE RISE IN CLAVICLE DURING INSPIRATION: 0 - NONE
RDOS TOTAL SCORE: 0
PARADOXICAL BREATHING PATTERN: 2 - PRESENT
ACCESSORY MUSCLE RISE IN CLAVICLE DURING INSPIRATION: 0 - NONE
RESPIRATORY RATE PER MINUTE: 0 - <19 BREATHS
PARADOXICAL BREATHING PATTERN: 2 - PRESENT
RDOS TOTAL SCORE: 3
HEART RATE PER MINUTE: 0 - <90 BEATS
GRUNTING AT END OF EXPIRATION: 2 - PRESENT
RESTLESS NONPURPOSEFUL MOVEMENTS: 0 - NONE
RDOS TOTAL SCORE: 7
ACCESSORY MUSCLE RISE IN CLAVICLE DURING INSPIRATION: 0 - NONE
INVOLUNTARY NASAL FLARING: 0 - NONE
RESPIRATORY RATE PER MINUTE: 1 - 19-30 BREATHS
ACCESSORY MUSCLE RISE IN CLAVICLE DURING INSPIRATION: 1 - SLIGHT RISE
RESTLESS NONPURPOSEFUL MOVEMENTS: 0 - NONE
GRUNTING AT END OF EXPIRATION: 2 - PRESENT
RESTLESS NONPURPOSEFUL MOVEMENTS: 0 - NONE
GRUNTING AT END OF EXPIRATION: 0 - NONE
LOOK OF FEAR: 0 - NONE
PARADOXICAL BREATHING PATTERN: 2 - PRESENT
RESTLESS NONPURPOSEFUL MOVEMENTS: 1 - OCCASIONAL, SLIGHT MOVEMENTS
INVOLUNTARY NASAL FLARING: 0 - NONE

## 2023-10-17 ASSESSMENT — PAIN - FUNCTIONAL ASSESSMENT
PAIN_FUNCTIONAL_ASSESSMENT: FLACC (FACE, LEGS, ACTIVITY, CRY, CONSOLABILITY)
PAIN_FUNCTIONAL_ASSESSMENT: FLACC (FACE, LEGS, ACTIVITY, CRY, CONSOLABILITY)
PAIN_FUNCTIONAL_ASSESSMENT: 0-10
PAIN_FUNCTIONAL_ASSESSMENT: 0-10
PAIN_FUNCTIONAL_ASSESSMENT: FLACC (FACE, LEGS, ACTIVITY, CRY, CONSOLABILITY)

## 2023-10-17 ASSESSMENT — COGNITIVE AND FUNCTIONAL STATUS - GENERAL
EATING MEALS: TOTAL
STANDING UP FROM CHAIR USING ARMS: TOTAL
WALKING IN HOSPITAL ROOM: TOTAL
MOVING FROM LYING ON BACK TO SITTING ON SIDE OF FLAT BED WITH BEDRAILS: TOTAL
TURNING FROM BACK TO SIDE WHILE IN FLAT BAD: TOTAL
DRESSING REGULAR UPPER BODY CLOTHING: TOTAL
DRESSING REGULAR LOWER BODY CLOTHING: TOTAL
DAILY ACTIVITIY SCORE: 6
CLIMB 3 TO 5 STEPS WITH RAILING: TOTAL
MOBILITY SCORE: 6
MOVING TO AND FROM BED TO CHAIR: TOTAL
PERSONAL GROOMING: TOTAL
HELP NEEDED FOR BATHING: TOTAL
TOILETING: TOTAL

## 2023-10-17 ASSESSMENT — PAIN SCALES - GENERAL
PAINLEVEL_OUTOF10: 8
PAINLEVEL_OUTOF10: 0 - NO PAIN
PAINLEVEL_OUTOF10: 0 - NO PAIN

## 2023-10-17 NOTE — CONSULTS
"Nutrition Assessment:   Reason for Assessment: Provider consult order for poor PO intake.    Patient is a 88 y.o. male presenting with worsening SOB, fatigue and abdomen and leg edema.  He is currently on levo, vaso and epi drips for support; also diuresing with lasix drip.  GOC meeting planned soon for patient.    Pt's medical history includes: acute on chronic combined systolic and diastolic heart failure , Anxiety and depression, Aortic valve disorder (03/21/2023), Atrial fibrillation, Benign prostate hyperplasia, CHF, Hypertensive heart disease with heart failure, Incarcerated right inguinal hernia, Mitral valve disease, Peripheral edema, Right inguinal hernia, Venous congestion, Wounds, multiple open, lower extremity      Nutrition History:  Food and Nutrient History: Pt unable to provide weight or nutrition history at visit earlier today d/t poor mental status.  RN with patient reports that pt is aspirating and if plan is to be more aggressive with care, he will need an SLP eval as he is unsafe to take a PO diet.       Anthropometrics:  Height: 182.9 cm (6' 0.01\")  Weight: 98.4 kg (216 lb 14.9 oz)  BMI (Calculated): 29.41  IBW/kg (Dietitian Calculated): 81 kg  Percent of IBW: 121 %     Wt Readings from Last 5 Encounters:   10/17/23 98.4 kg (216 lb 14.9 oz)   10/07/23 90.7 kg (200 lb)   06/19/23 89.8 kg (198 lb)   06/13/23 90.3 kg (199 lb)   04/24/23 88.5 kg (195 lb)      Objective/Subjective Weight History:     Weight Change %:      Weight falsely elevated d/t edema.        Nutrition Focused Physical Exam Findings:    Subcutaneous Fat Loss:   Orbital Fat Pads: Severe (dark circles, hollowing and loose skin)   Buccal Fat Pads: Mild-Moderate (flat cheeks, minimal bounce)   Triceps: Severe (negligible fat tissue)       Muscle Wasting:  Temporalis: Severe (hollowed scooping depression)  Pectoralis (Clavicular Region): Severe (protruding prominent clavicle)  Deltoid/Trapezius: Severe (squared shoulders, acromion " process prominent)        Quadriceps: Defer  Gastrocnemius: Defer  Edema:  Edema: +3 moderate   Edema Location: B/L UE and B/L LE     Objective Data:  Nutrition Significant Labs:  Na+ 137  K+ 3.8  Chloride 96  Bicarb 21  BUN 89  Creatinine 1.76  Calcium 9.4  T. Bili 2.4    Nutrition Specific Mediations:  Pertinent meds:  Solu-cortef  antibiotics    I/O:         Dietary Orders (From admission, onward)       Start     Ordered    10/08/23 1002  Adult diet 2-3 grams sodium; 2000 mL fluid  Diet effective now        Question Answer Comment   Diet type 2-3 grams sodium    Dietary fluid restriction / 24h: 2000 mL fluid        10/08/23 1009                     Estimated Needs:          Daily kcal needs range:   Total Protein Estimated Needs (g):  (2998-2717)   Method for Estimating Needs: MSJ= 1698    Total Fluid Estimated Needs (mL):  (120+)   Method for Estimating Needs: 1.5 x 81kg       Nutrition Diagnosis:  Malnutrition Diagnosis  Patient has Malnutrition Diagnosis: Yes  Diagnosis Status: New  Malnutrition Diagnosis: Moderate malnutrition related to chronic disease or condition  As Evidenced by: suspect pt with poor PO intake with noted severe muscle and fat loss on upper body and face         Nutrition Interventions and Recommendations:        Nutrition Prescription:   If plan is to continue aggressive medical care, would consult SLP for assessment in light of RN concern for aspiration.    If plan becomes Cortrak tube for nutrition support, consult RDN for recommendations.       Time Spent/Follow-up Reminder:   Follow Up  Time Spent (min):  (60)  Last Date of Nutrition Visit: 10/17/23  Nutrition Follow-Up Needed?: Dietitian to reassess per policy

## 2023-10-17 NOTE — PROCEDURES
A time out was preformed. The patients RIGHT neck region was prepped and draped in a sterile fashion using chlorhexidine scrub. Anesthia was achieved with 1% lidocaine. The right internal jugular vein was accessed using a micropuncture needle and sheath. Venous blood was withdrawn and the sheath was advanced into the vein and the needle was withdrawn. A larger guidewire was advanced through the sheath. A small incision was made with a 10 blade scalpel and the sheath was exchanged for a dilator with overlying Japanese #8 catheter over the guidewire until appropriate dilation was obtained. Dilator was flushed appropriately, taking care to first withdraw any free air in system. We manipulated a [employer]-Venu catheter through the venous system, to the pulmonary capillary wedge position monitoring for appropriate RA and RV wave-forms. No ectopy was noted on the monitor. A Wedge pressure was obtained. We reviewed the data and felt that it was adequate. We cleaned and dressed the access site. Post procedure chest x-ray was ordered and will be reviewed for correct placement and signs of pneumothorax. Patient tolerated the procedure well.     [employer] was locked at: 55 cm     Opening swan numbers:  BP         109/52   MAP      72  CVP       22  PAP       63/21  PAWP    39  SVR       1148  CO/CO   4.62/2.1  SVO2     58%     Medications: On Levo 0.12 mcg/kg/min, Epi 0.05 mcg/kg, Vaso 0.03.

## 2023-10-17 NOTE — NURSING NOTE
0838 - timeout R swan with  NP Clara and NP Bessie. Sterile enviorment initiated    0844 - NP Bessie started procedure    0905 - R Intra Jugular swan placed with no sedation, lidocaine for comfort with cortis at 57 cm. Pt tolerated procedure well.  XR ordered to confirm placement    Sandra Irwin RN BSN

## 2023-10-17 NOTE — CONSULTS
Consults    Reason For Consult: communication/complex medical decision making, symptom management, and patient/family support      History Of Present Illness  Rajendra Chavis is a 88 y.o. male with a pmh of CHF, severe MR and TR, CKD 3, Afib on eliquis, and right pretibial venous ulcer who presented with SOB, fatigue, and edema. He was being managed for HF exacerbation on RNF and then was transferred to HF ICU for symptomatic bradycardia requiring pressors, acute hypoxemic respiratory failure in the setting of aspiration PNA, and worsening mixed shock.    Symptoms - limited, pt on airvo and not verbally responding       Serious illness conversation: spoke with daughters, son-in-law, and granddaughter at bedside. A separate conversation was held with wife Sunita via phone call.   Information: prefers full disclosure of all details   Understanding: good understanding of current health situation   Prognosis: prefers full disclosure and is aware that time is short   Joys: grandchildren, great grandchildren, being active   Goals: given that meaningful, full recovery is no longer achievable, family wishes for pt to die a peaceful death and transition to comfort care  Minimal acceptable outcome: independence, cognition intact  Maximal burden: pt would not want to continue burdensome measures, would not want CPR/intubation or dependence on machines   Family: wife wishes not to be contacted if/when pt dies, please contact daughter trevin    Advance Care Planning   Surrogate decision maker: rossy Dorsey  Advance directives: not on file         Social History  He reports that he has quit smoking. His smoking use included cigarettes. He has never used smokeless tobacco. No history on file for alcohol use and drug use.    Social hx: lives at home with wife, previously independent in ambulation     Allergies  Patient has no known allergies.    Physical Exam  Physical Exam  Constitutional:       Appearance: He is ill-appearing.   HENT:  "     Head: Normocephalic.   Cardiovascular:      Rate and Rhythm: Tachycardia present.   Pulmonary:      Comments: coarse  Abdominal:      General: Abdomen is flat.   Skin:     General: Skin is warm and dry.   Neurological:      Mental Status: He is disoriented.         Vitals  Vital Signs  Temp: 35.4 °C (95.7 °F)  Temp Source: Temporal  Heart Rate: 60  Heart Rate Source: Monitor  Resp: 22  BP: 102/51  BP Location: Left arm  BP Method: Arterial line  Patient Position: Lying    Height and Weight  Height and Weight  Height: 182.9 cm (6' 0.01\")  Weight: 98.4 kg (216 lb 14.9 oz)   Weight Method: Bed scale  BSA (Calculated - sq m): 2.24 sq meters  BMI (Calculated): 29.41  Weight in (lb) to have BMI = 25: 184      Relevant Results  [unfilled]    Encounter Date: 10/08/23   ECG 12 Lead   Result Value    GLUCOSE 122    Narrative    Bessie JIN BRIAN Jamison-CNP     10/16/2023  7:43 PM  The patient was prepped and draped in the usual sterile manner   using chlorhexidine scrub. 1% lidocaine was used to numb the   region. The LEFT  radial artery was palpated and successfully   cannulated on the first pass with ultrasound guided. Pulsatile,   arterial blood was visualized and the artery was then threaded   using the Seldinger technique and a catheter was then sutured   into place. Good wave-form was obtained. The patient tolerated   the procedure well without any immediate complications. The area   was cleaned and Tegaderm was applied.         Assessment/Plan     Medical Problems       Problem List       * (Principal) Acute heart failure with preserved ejection fraction (CMS/HCC)    Cellulitis of left lower limb    Chronic kidney disease, stage 3 unspecified (CMS/HCC)    Elevated brain natriuretic peptide (BNP) level    CHF (congestive heart failure) (CMS/HCC) posing threat to life and function    Major depressive disorder, recurrent, unspecified (CMS/HCC)    Moderate aortic regurgitation    Nonrheumatic mitral valve regurgitation "    Onychocryptosis    PAD (peripheral artery disease) (CMS/HCC)    Tricuspid regurgitation    Ulcer of lower extremity (CMS/HCC)    Chronic venous insufficiency    Venous stasis ulcer of calf (CMS/HCC)          #complex medical decision making   #goals of care  #advance care planning   -code status: COMFORT CARE  -surrogate decision maker: wife Sunita    I spent 75 minutes in the care of this patient. I spent an additional 90 min in ACP    Medical Decision Making was high level due to high complexity of problems, extensive data review, and high risk of management/treatment.      Thank you for allowing us to care for this patient. Palliative will continue to follow as needed.   Palliative medicine is available Monday-Friday, 8a-6p. Please contact team with any questions or concerns.  Team pager [phone]  Crissy Gonzalez DNP, CNP

## 2023-10-17 NOTE — PROGRESS NOTES
[facility]  HFICU PROGRESS NOTE    Rajendra Chavis/[id number]    Admit Date: 10/8/2023  Hospital Length of Stay: 9   ICU Length of Stay: 22h   Primary Service:   Primary HF Cardiologist:   Referring:    INTERVAL EVENTS / PERTINENT ROS:   Patient transferred to HFICU with code white team yesterday evening. Overnight Dopamine drip initiated, with tachy-arrhythmia, discontinued,  and Epi drip started, hypotension persistent, Vaso added this morning.    This morning patient appears very ill, continuously requiring increasing pressors, chest X-ray concerns of aspiration pneumonia, pan-culture sent, Empyric ATB initiated. SG placed,    Plan:  - C/w ATB  - Trending lactate  - Goal of care discussion   - Supportive care    MEDICATIONS  Infusions:  bumetanide, Last Rate: Stopped (10/17/23 1200)  DOPamine, Last Rate: Stopped (10/17/23 0300)  EPINEPHrine, Last Rate: 0.14 mcg/kg/min (10/17/23 1336)  heparin, Last Rate: 12 Units/kg/hr (10/16/23 2038)  norepinephrine, Last Rate: 0.08 mcg/kg/min (10/17/23 1215)      Scheduled:  hydrocortisone sodium succinate, 50 mg, q6h  [START ON 10/18/2023] pantoprazole, 40 mg, q12h  pantoprazole, 80 mg, Once  piperacillin-tazobactam, 2.25 g, q6h      PRN:  heparin, 2,000-4,000 Units, q4h PRN  oxygen, , Continuous PRN - O2/gases  oxygen, , Continuous PRN - O2/gases      Invasive Hemodynamics:    Most Recent Range Past 24hrs   BP (Art) 104/48 Arterial Line BP 1  Min: 86/51  Max: 118/58   MAP(Art) 66 mmHg Arterial Line MAP 1 (mmHg)   Min: 61 mmHg  Max: 84 mmHg   RA/CVP 22 mmHg CVP (mean)  Min: 22 mmHg  Max: 22 mmHg   PA 69/29 PAP  Min: 63/21  Max: 70/32   PA(mean) 42 mmHg PAP (Mean)  Min: 39 mmHg  Max: 44 mmHg   PCWP   No data recorded   CO 4.6 L/min (CRYSTAL) CO (L/min)  Min: 4.6 L/min  Max: 4.6 L/min   CI 2.1 L/min/m2 (CRYSTAL) CI (L/min/m2)  Min: 2.1 L/min/m2  Max: 2.1 L/min/m2   Mixed Venous   No data recorded   SVR  865 (dyne*sec)/cm5 SVR (dyne*sec)/cm5  Min: 865  (dyne*sec)/cm5  Max: 865 (dyne*sec)/cm5   PVR   No data recorded     MCS:   Heart Mate III:     Most Recent Range Past 24hrs   Flow   No data recorded   Speed   No data recorded   Power   No data recorded   PI   No data recorded     ECMO:     Most Recent Range Past 24hrs   Flow   No data recorded   Speed   No data recorded   Sweep   No data recorded     Impella:      Most Recent Range Past 24hrs   Performance Level   No data recorded   Flow (L/min)   No data recorded   Motor Current   No data recorded   Placement Signal    Placement OK could not be evaluated. This SmartLink does not work with rows of the type: Custom List   Purge (mmHg)   No data recorded   Purge rate (mL/hr)   No data recorded     VENT:    Most Recent Range Past 24hrs   Mode      FiO2   No data recorded   Rate   No data recorded   Vt    No data recorded   PEEP   No data recorded     PHYSICAL EXAM:   Visit Vitals  /51   Pulse 63   Temp 35.4 °C (95.7 °F) (Temporal)   Resp 25   Ht 1.829 m (6')   Wt 98.4 kg (216 lb 14.9 oz)   SpO2 100%   BMI 29.42 kg/m²   Smoking Status Former   BSA 2.24 m²     Wt Readings from Last 5 Encounters:   10/17/23 98.4 kg (216 lb 14.9 oz)   10/07/23 90.7 kg (200 lb)   06/19/23 89.8 kg (198 lb)   06/13/23 90.3 kg (199 lb)   04/24/23 88.5 kg (195 lb)     INTAKE/OUTPUT:  I/O last 3 completed shifts:  In: 890.2 (9 mL/kg) [P.O.:150; I.V.:690.2 (7 mL/kg); IV Piggyback:50]  Out: 975 (9.9 mL/kg) [Urine:975 (0.3 mL/kg/hr)]  Weight: 98.4 kg    Physical Exam  DATA:  CMP:  Recent Labs     03/21/23  0530 03/22/23  0526 03/23/23  0606 03/24/23  0542 03/26/23  0539 03/28/23  0540 03/28/23  0928 10/05/23  1651 10/06/23  2243 10/07/23  0905 10/08/23  0613 10/09/23  2016 10/10/23  0238 10/10/23  1926 10/11/23  1857 10/12/23  2017 10/13/23  1740 10/14/23  1932 10/16/23  0618 10/16/23  1706 10/17/23  0030 10/17/23  0334      < > 136 137   < > CANCELED   < > 135* 135* 137   < > 139  --  139 133* 136 133* 132* 136 136 134* 137   K 4.2    < > 3.8 3.5   < > CANCELED   < > 4.0 4.2 3.8   < > 3.7  --  3.6 3.6 3.7 3.4* 4.4 4.1 4.7 3.9 3.8      < > 101 102   < > CANCELED   < > 98 99 101   < > 100  --  100 95* 95* 95* 95* 97* 97* 97* 96*   CO2 26   < > 26 27   < > CANCELED   < > 26 28 27   < > 26  --  26 27 28 23 26 24 29 25 21   ANIONGAP 14   < > 13 12   < > CANCELED   < > 15 12 13   < > 17  --  17 15 17 18 15 19 15 16 24*   BUN 79*   < > 67* 56*   < > CANCELED   < > 77* 75* 72*   < > 65*  --  62* 63* 64* 67* 75* 81* 86* 90* 89*   CREATININE 1.51*   < > 1.46* 1.34*   < > CANCELED   < > 1.47* 1.51* 1.36*   < > 1.48*  --  1.49* 1.49* 1.58* 1.45* 1.71* 1.71* 1.46* 1.73* 1.76*   EGFR  --   --   --   --   --   --    < > 46* 44* 50*   < > 45*  --  45* 45* 42* 46* 38* 38* 46* 38* 37*   MG 2.55*  --  2.33 2.27  --  CANCELED  --  2.23 2.38 2.38  --   --  2.39  --   --  2.47*  --   --   --  2.56*  --   --     < > = values in this interval not displayed.     Recent Labs     03/20/23  1405 04/26/23  0914 10/05/23  1651 10/06/23  2243 10/08/23  2119 10/10/23  1926 10/11/23  1857 10/12/23  2017 10/13/23  1740 10/14/23  1932 10/16/23  1706 10/17/23  0030 10/17/23  0334   ALBUMIN 3.7   < > 3.2* 3.0*   < > 2.9* 3.2* 3.0* 2.8* 2.9* 2.9* 2.8* 2.8*   ALT 26  --  14 15  --   --  20  --   --   --  19 18 19   AST 28  --  23 24  --   --  33  --   --   --  38 27 28   BILITOT 1.6*  --  1.6* 1.3*  --   --  1.9*  --   --   --  2.0* 2.1* 2.4*   LIPASE  --   --  78  --   --   --   --   --   --   --   --   --   --     < > = values in this interval not displayed.     CBC:  Recent Labs     10/10/23  1926 10/11/23  1856 10/12/23  2017 10/13/23  1740 10/14/23  1932 10/16/23  0619 10/16/23  1706 10/17/23  0334   WBC 6.7 9.7 11.9* 8.3 9.5 8.0 8.9 19.0*  19.0*   HGB 11.5* 11.9* 12.2* 11.7* 12.8* 12.3* 12.3* 12.5*  12.5*   HCT 38.2* 38.7* 39.0* 37.7* 41.5 38.8* 39.2* 40.2*  40.2*   PLT 77* 62* 76* 60* 71* 52* 55* 98*  98*   MCV 77* 76* 74* 75* 75* 74* 75* 76*  76*     COAG:   Recent  "Labs     05/08/23  0743 07/20/23  0825 10/05/23  1651 10/11/23  1857 10/16/23  1706 10/17/23  0327   INR 1.5* 2.3* 2.3* 2.2* 4.1* 5.0*     ABO:   Recent Labs     10/16/23  1706   ABO A     HEME/ENDO:  Recent Labs     03/21/23  0530 10/11/23  1004   FERRITIN  --  65   IRONSAT  --  13*   HGBA1C 6.6*  --       CARDIAC:   Recent Labs     03/20/23  1405 03/21/23  0530 04/26/23  0914 06/20/23  0955 10/05/23  1651 10/06/23  2243 10/10/23  1926 10/12/23  2017 10/16/23  0618   LDH  --   --   --   --   --   --  201  --   --    TROPHS 23* 25*  --   --  22* 21*  --   --   --    *  --  640* 570* 506*  --   --  492* 231*     Recent Labs     05/08/23  0908 10/16/23  1946 10/17/23  0333 10/17/23  0819 10/17/23  1143   LACMX  --   --   --   --  7.4*   LACTATEART  --  2.2* 2.9* 5.6*  --    SO2MV 62  --   --   --  56   O2CMX 60.4  --   --   --  54.7     No results for input(s): \"TACROLIMUS\" in the last 34296 hours.    EKG:  ICD Check:   Chest Radiograph  CT:   Echocardiogram:  Cardiac Catheterization:  Metabolic Stress:  PET:  NM SPECT:  MRI:     Prior to Admission Meds:  Medications Prior to Admission   Medication Sig Dispense Refill Last Dose    Eliquis 2.5 mg tablet Take 1 tablet (2.5 mg) by mouth 2 times a day.       potassium chloride CR 10 mEq ER tablet Take 1 tablet (10 mEq) by mouth once daily. Do not crush, chew, or split.       tamsulosin (Flomax) 0.4 mg 24 hr capsule Take 1 capsule (0.4 mg) by mouth 2 times a day.       torsemide (Demadex) 20 mg tablet Take 2 tablets (40 mg) by mouth once daily.       vit C,N-Lr-bpgqe-lutein-zeaxan (PreserVision AREDS-2) 250-90-40-1 mg capsule Take 1 capsule by mouth once daily.          ASSESSMENT AND PLAN: Rajendra Chavis is a 88 y.o. male presenting with  PMHx of HFpEF (EF 50-55%), severe MR & TR, CKD 3, Afib on Eliquis, and R pretibial venous ulcer who presented to [facility] on 10/5/23 with complaints of worsened SOB, fatigue and abdominal/ leg edema. It was recommended by his " HF Cardiologist to be admitted for optimization of volume status prior to scheduled DENIS scheduled on 10/19/23. He was initially started on Lasix gtt while in the [place] [facility] before being transferred to [facility].      On RNF, patient continued to be diuretic resistant to increasing bumex drip and bolus IV. He has had low heart rates, but consistently staying in the 40's with altered cognitive status. Family at bedside and concerned of altered mental response. Per discussion among Dr Kurtz, structural and HF and will start dopamine for better cardiac output and transfer to HFICU for better optimization and monitoring possible RHC on Tuesday.  Family is aware of plan and in agreement.       Patient transferred to HFICU with code white team this evening. Upon transfer, patient oriented x2, very sluggish to answer questions, but follows commands, BLE extremities warm to touch. A fib, HR 45, BP 96/62. Multiple bruise on bilateral extremities and skin tears.      Neuro:  # Delayed speech  - patient exhibiting word slurring/trouble word finding on exam; although A&Ox2  - wife attributed to patient's current frustration  - family notes that patient tends to be forgetful/flustered medical settings, denies concerns for underlying  while at home   - Dr. Mack confirmed this is his observable baseline from previous office visits  - Dr. Mack recommending CT head if he has not received one previously  - original consulted OT consulted for MOCA, however will postpone MOCA assessment until patient is discharged/to be completed with [facility] OT as he may score falsely low due to anxiety while in hospital setting   - mumbling speech but A/Ox3 deferring head CT for now      # Anxiety. Depression, Acute pain   - Serial neuro and pain assessments   - PO Tylenol PRN for pain  - PT/OT Consult, OOB to chair  - CAM ICU score every shift  - Sleep/wake cycle normalization     # Physical Status  -Overweight: BMI: 27.27  -Age-related debility/bed  confined     Cardiac:   # acute on chronic diastolic HF/HFpEF. LVEF 50-55%  # Severe MR/TR  - TTE (03/21/2023): LVEF 55%, Right ventricular volume and pressure overload. Severely enlarged right ventricle.  Moderate aortic valve regurgitation. Moderate to severe mitral valve regurgitation. Severe tricuspid regurgitation visualized.  A bubble study using agitated saline was performed. Bubble study is positive. Moderately sized patent foramen ovale. Mild to moderately elevated right ventricular systolic pressure.  - HAFSA 5/8/23 EF 55%, LA moderately dilated, RA severely dilated, severe MR, severe TR, small PFO (10 bubbles), plaque visualized in descending & transverse aorta  - L/RHC 5/08/2023 No angiographically significant CAD in right-dominant circulation. Elevated filling/pulmonary pressures. Preserved CO/CI by assumed Francisco method.  - CXR in [place] ED 10/5 small to moderate right pleural effusion  - Cancelled scheduled MitraClip +/- tricuspid clip with Attizzani and Elgudin   - admit weight (10/16): 91.2 Kg   - admit BNP  - Diuretics as needed  - home torsemide 20mg on hold  - D/c'd Dopamine gtt at 5 mcg/kg/min due to tachyarrhythmia   -   - Will plan for RHC with leave in PAC to guide optimization   - Will update the Structural Heart Team Am, planned for DENIS on 10/19  - Daily standing weights, 2gm sodium diet, 2L fluid restriction, strict I&Os     # Atrial fibrillation in slow ventricular response  -Bradycardia, HR 30-40's, ventricular rhythm  - per Dr. Mack: rate is reasonably well controlled, do not think restoration of sinus rhythm will have any benefit. Given his fall risk, and nosebleeds we can consider him for Watchman procedure. We will defer this until his valves are addressed.  - Afib with SVR HR 30-40s on tele this morning, typical baseline 50-60s, asymptomatic  - EP consulted: would recommend aggressive diuresis to euvolemia first, then followed by cardioversion.  This may help his heart rate, and even  potentially his MR and TR which should be reevaluated after cardioversion. Should he need a transvenous pacer or pacing system otherwise, will need to discuss implications of a tricuspid clip and coordinate with EP. continue AC with eliquis 2.5. consider amyloid work up (light chain ratio 1.83, SPEP and UPEP pending)  - Hold Eliquis 2.5mg BID - 10/16 hold 3 days prior to mitral clip on Thursday 10/19 needs heparin bridge for possible cardioversion watch with low platlets  no initial bolus       #Electrolyte Disturbances  -Replace K, Mag as needed     # Lactic acidemia  - Lactate trending up: 5.6 today  - Will trend    Pulmonary:   # Possible aspiration pneumonia  - Procal (10/17): 0.19  - CXR (10/17): Bilateral hazy opacities in the lower lungs, new on the left side. Findings may represent worsening pulmonary edema, however can not exclude developing infiltrate. Small right and trace left pleural effusions.  - On high flow O2, 60 L, 70% FIO2  - On Empyric ATB Vanco/Zosyn  - Monitor and maintain SpO2 > 92%     GI:  - Bowel regimen  - PPI     :  #CORRIE/CKD stage: 3   - baseline: 1.3-1.6  - Admit Cr: 1.47  - Cr trend:  1.7  -I/Os  -avoid hypotension and nephrotoxic agents     BPH  - c/w home tamsulosin      Heme:  #Anemia in the setting of chronic disease  -H/H stable  - Iron study (10/11): 49/366/13%  - No IV venofer as patient is active has infection,  -    # Coagulopathy due to on home Eliquis  - Hold Heparin drip   - Hold home Eliquis  - INR (10/17): 5.0     # Thrombocytopenia.   - unclear baseline   - plts low 100s in 3/2023, then normal in March and May   - admit plts:  93  - Plts (10/7):  98 (52)  - Heme consulted for thrombocytopenia. 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 While there was some mild elevation in k/l ratio, it was only minimal. Pending SPEP.   In order to better characterize the MDS we believe the patient  needs a bone marrow biopsy  - aspirin is not contraindicated as platelets are above 50K   ** family endorses significant craving of ice at home      Endo:  #DM  - hgbA1c: Pending     #Thyroid  -TSH (10/17): Pending      ID:  # Leukocytosis, septic shock  - WBC up trend: 19 (9.5 )   - Blood c/s (10/17): Pending   - Empyric ATB:  Vanco/Zosyn initiated 10/17  - Hydrocortisone 50 mg QID  - On Levo/Epi/Vaso drip   - dermatology to assess for concern of cellulitis of RUE +/- infection of R pretibial venous ulcer  - UA (10/17): Pending    -trend temps q4h     RUE swelling  - Erythematous R arm swelling with ACE wrap applied  - Erythematous demarcation marked with skin marker to monitor for border expansion  - Mutiple skin tears on chest and extremities  - RUE Venous duplex U/S negative for DVT  - consulted dermatology, appreciate recs     Right pretibial venous ulcer  PVD  - wound care followed patient as an outpatient and while inpatient at [place]  - wound care consulted, recs ordered,  change RLE (ankle/shin) dressing q3 days using Medihoney (order from , oracle #[id number]), Adaptic (ie., Curity non-adherent strips), Aquacel Ag and Mepilex foam to secure. Apply lotion to dry periwound skin and LLE.   - Dressing changed 10/18 per nursing  - dermatology to also assess ulcer while assess RUE (as above), appreciate recs        Feeding/fluids: NPO  Thromboprophylaxis: SCDs   VAP bundle: n/a  Ulcer prophylaxis: PPI  Glycemic control: n/a  Bowel care: Colace & miralax prn  Indwelling catheter: None     Lines:   PIVs   A line: 10/16   Salter: 10/16  RIJ MAC/SG: bedside 10/17     PT/OT:     Code Status: Full Code     Family Primary Contact/Next of Kin:     Dispo:  HF ICU  A line: Left Radial on 10/16  RIJ MAC/SG:  bedside 10/17     A. -G. reviewed      Seen and assessed with Dr. Ricardo            DVT:  NUTRITION: Adult diet 2-3 grams sodium; 2000 mL fluid  EMERGENCY CONTACT: Extended Emergency Contact Information  Primary  Emergency Contact: Sunita Chavis  Home Phone: [phone]  Relation: Spouse  CODE STATUS: Full Code  DISPO:   FOLLOWUP:          Future Appointments   Date Time Provider Department [place]   11/17/2023  8:00 AM  RACHEL [id number] CARD1 [id number] [facility]   12/11/2023  9:20 AM Ramon Mack MD [id number] [place]   1/23/2024  1:15 PM Vinicius Jiménez MD [id number] [place]   10/18/2024  8:00 AM  RACHEL [id number] CARD1 [id number] [facility]            _________________________________________________  JENNA Anderson

## 2023-10-17 NOTE — SIGNIFICANT EVENT
Given pt's current clinical status and decompensation, wife and family agreeable to comfort care transition tonight. They are aware that there is a high likelihood that pt will die tonight. Attending, team, and nurse made aware.     Per wife, please do NOT call wife if/when the patient dies.  Please call daughter Purnima to notify family [phone]    Crissy ROLON, CNP

## 2023-10-17 NOTE — SIGNIFICANT EVENT
DNR Comfort Care     Discussed and updated patient's daughters at bedside. Patient's daughters directly communicate to patient's wife Sunita (POA) via phone.  Family members understand the patient 's critically ill condition and requires very high dose of pressors and high flow O2 to support patient's life.  Family agree to comfort care measures, they DO NOT want to prolong his life per patient's wish.    The plan of care discussed with attending physician Dr. Ricardo and bedside RN.

## 2023-10-17 NOTE — PROGRESS NOTES
"Vancomycin Dosing by Pharmacy- INITIAL    Rajendra Chavis is a 88 y.o. year old male who Pharmacy has been consulted for vancomycin dosing for pneumonia. Based on the patient's indication and renal status this patient will be dosed based on a goal AUC of 400-600.     Renal function is currently stable.    Visit Vitals  /51   Pulse 68   Temp 35.3 °C (95.5 °F) (Temporal)   Resp 23        Lab Results   Component Value Date    CREATININE 1.73 (H) 10/17/2023    CREATININE 1.46 (H) 10/16/2023    CREATININE 1.71 (H) 10/16/2023    CREATININE 1.71 (H) 10/14/2023        Patient weight is No results found for: \"PTWEIGHT\"    No results found for: \"CULTURE\"     I/O last 3 completed shifts:  In: - (0 mL/kg)   Out: 1000 (11 mL/kg) [Urine:1000 (0.3 mL/kg/hr)]  Weight: 91.2 kg   [unfilled]    Lab Results   Component Value Date    PATIENTTEMP 37.0 10/17/2023    PATIENTTEMP 37.0 10/16/2023    PATIENTTEMP 37.0 05/08/2023    PATIENTTEMP 37.0 05/08/2023          Assessment/Plan     Patient will be given a loading dose of 1500 mg.  Will initiate vancomycin maintenance,  1000 mg every 24 hours.    Loading dose: 1500 mg at 00:00 10/17/2023.  Regimen: 1000 mg IV every 24 hours.  Start time: 04:39 on 10/17/2023  Exposure target: AUC24 (range)400-600 mg/L.hr   AUC24,ss: 505 mg/L.hr  Probability of AUC24 > 400: 75 %  Ctrough,ss: 17 mg/L  Probability of Ctrough,ss > 20: 34 %  Probability of nephrotoxicity (Lodise LAISHA 2009): 13 %    Follow-up level will be ordered on 10/18 @1100 unless clinically indicated sooner.  Will continue to monitor renal function daily while on vancomycin and order serum creatinine at least every 48 hours if not already ordered.  Follow for continued vancomycin needs, clinical response, and signs/symptoms of toxicity.       Latosha Rowley, PharmD       "

## 2023-10-17 NOTE — PROGRESS NOTES
Physical Therapy                 Therapy Communication Note    Patient Name: Rajendra Chavis  MRN: [id number]  Today's Date: 10/17/2023     Discipline: Physical Therapy    Missed Visit Reason: Missed Visit Reason: Patient placed on medical hold (pt on three pressors for BP support at this time as unstable with repositioning per RN. Will hold PT as pt not medically appropriate for mobility.)    Missed Time: Attempt; 14:15    Comment:

## 2023-10-17 NOTE — PROGRESS NOTES
SOCIAL WORK NOTE   SW met with team for rounds. Patient is currently in ICU for septic and cardiogenic shock. He is on pressors. Possible GOC discussion. Currently recommended for AR, but notes indicate return to home care [facility]. Social work to follow.   Annetta Aguirre, GONZALEZ, LISW-S ([id number])

## 2023-10-17 NOTE — PROGRESS NOTES
Spiritual Care Visit     Provided support for family (patient's daughters, son-in-law, grandchildren) as patient will be transitioned to comfort care.

## 2023-10-17 NOTE — PROGRESS NOTES
Structural Heart Progress Note    HPI   Rajendra Chavis is a 88 y.o. male with a PMH of diastolic CHF (EF 50-55%), severe MR & TR, CKD 3, Afib on Eliquis, and R pretibial venous ulcer who presented to [facility] on 10/5/23 with complaints of worsened SOB, fatigue and abdominal/ leg edema. Patient was instructed to report to ED by his cardiologist for evaluation and admission to [facility] for optimization of his heart disease prior to DENIS scheduled on 10/19. Patient follows with Godfrey Mack, Jessy and Ramicone. Last echo in 5/2023 with EF 55%, severe MR & TR, mild AR. While awaiting a bed at [facility], patient was started on lasix ggt at rate of 5 on admission at [facility]. Transferred to [facility] for further optimization of CHF prior to scheduled DENIS.     We have been following peripherally while the pt was having HF status optimized    Patient Active Problem List    Diagnosis Date Noted    Acute heart failure with preserved ejection fraction (CMS/HCC) 10/08/2023    Elevated brain natriuretic peptide (BNP) level 10/05/2023    CHF (congestive heart failure) (CMS/HCC) 10/05/2023    Moderate aortic regurgitation 10/05/2023    Nonrheumatic mitral valve regurgitation 10/05/2023    Onychocryptosis 10/05/2023    PAD (peripheral artery disease) (CMS/[facility]) 10/05/2023    Tricuspid regurgitation 10/05/2023    Ulcer of lower extremity (CMS/[facility]) 10/05/2023    Chronic venous insufficiency 10/05/2023    Venous stasis ulcer of calf (CMS/[facility]) 10/05/2023    Cellulitis of left lower limb 03/29/2023    Chronic kidney disease, stage 3 unspecified (CMS/[facility]) 03/29/2023    Major depressive disorder, recurrent, unspecified (CMS/[facility]) 03/29/2023        LOS: 9 days     Objective     Vital signs in last 24 hours:  Temp:  [35 °C (95 °F)-35.5 °C (95.9 °F)] 35.4 °C (95.7 °F)  Heart Rate:  [42-77] 70  Resp:  [12-32] 26  BP: ()/(50-52) 102/51  Arterial Line BP 1: ()/(44-77) 94/51    Physical Exam:  Constitutional: Frail appearing, drowsy x1 (self),  "ill-appearing, cooperative  Eyes: PERRL, EOMI, clear sclera  ENMT: mucous membranes moist  Head/Neck: Neck supple,   Respiratory/Thorax: Poor chest expansion, thorax symmetric, lung sounds with crackles and rhonchi throughout  Cardiovascular: Regular rate and rhythm, 4/6 holosystolic murmur (apex) difficult to hear d/t lung sounds  Gastrointestinal: Nondistended, soft, non-tender, no rebound tenderness or guarding, no masses palpable, no organomegaly, +BS  Extremities: 3-4+ BLE edema, lower extremity cyanosis  Neurological: Frail appearing, drowsy x1 (self), ill-appearing, cooperative, follows commands, profound generalized weakness  Psychological: unable to assess   Skin: cool and wet, multiple ulcers and skin tears      Lab Review   Lab Results   Component Value Date     10/17/2023     (L) 10/17/2023     10/16/2023    K 3.8 10/17/2023    K 3.9 10/17/2023    K 4.7 10/16/2023    CO2 21 10/17/2023    CO2 25 10/17/2023    CO2 29 10/16/2023    BUN 89 (H) 10/17/2023    BUN 90 (H) 10/17/2023    BUN 86 (H) 10/16/2023    CREATININE 1.76 (H) 10/17/2023    CREATININE 1.73 (H) 10/17/2023    CREATININE 1.46 (H) 10/16/2023    GLUCOSE 82 10/17/2023    GLUCOSE 90 10/17/2023    GLUCOSE 77 10/16/2023    CALCIUM 9.4 10/17/2023    CALCIUM 9.6 10/17/2023    CALCIUM 9.5 10/16/2023     No results found for: \"CKTOTAL\", \"CKMB\", \"CKMBINDEX\", \"TROPONINI\"  Lab Results   Component Value Date    WBC 19.0 (H) 10/17/2023    WBC 19.0 (H) 10/17/2023    WBC 8.9 10/16/2023    HGB 12.5 (L) 10/17/2023    HGB 12.5 (L) 10/17/2023    HGB 12.3 (L) 10/16/2023    HCT 40.2 (L) 10/17/2023    HCT 40.2 (L) 10/17/2023    HCT 39.2 (L) 10/16/2023    MCV 76 (L) 10/17/2023    MCV 76 (L) 10/17/2023    MCV 75 (L) 10/16/2023    PLT 98 (L) 10/17/2023    PLT 98 (L) 10/17/2023    PLT 55 (L) 10/16/2023     Scheduled medications  hydrocortisone sodium succinate, 50 mg, intravenous, q6h  piperacillin-tazobactam, 2.25 g, intravenous, q6h  vancomycin, 1,000 " mg, intravenous, q24h      Continuous medications  bumetanide, 2 mg/hr, Last Rate: Stopped (10/17/23 1200)  DOPamine, 5 mcg/kg/min, Last Rate: Stopped (10/17/23 0300)  EPINEPHrine, 0.01-1 mcg/kg/min, Last Rate: 0.14 mcg/kg/min (10/17/23 1200)  heparin, 0-4,000 Units/hr, Last Rate: 12 Units/kg/hr (10/16/23 2038)  norepinephrine, 0.01-1 mcg/kg/min, Last Rate: 0.08 mcg/kg/min (10/17/23 1215)      PRN medications  PRN medications: heparin, oxygen, oxygen     Assessment/Plan   Rajendra Chavis has been in the hospital since 10/10/23 for HF optimization.  Unfortunately he has been followed by EP for bradycardia (30-40s at times), Hematology of acute thrombocytopenia, Dermatology for possible cellulitis and chronic infected leg ulcers.  On 10/16/23 Heart Failure was consulted as the pt continued to be resistant to IV diuretics.  Discussed with HF, had concerns new bradycardia was driving down the pt's cardiac output, pt started on IV Dopamine.  Unfortunately pt then had a code white d/t bradycardia and hypotension.  Pt was acute transferred to the HFICU where the pt was transitioned to EPI and LEVO gtts.    10/17/23 - Pt having central line placed for SGC placement and requiring high-doses of Levo and Epi.  Lactic acid climbing and WBCs increase to 19k.  O2 requirements increased.  CXR consistent with possible aspiration pneumonia.  Pt was cool and wet on assessment, continuing to be consistent with a shock state (likely combined Cardiac and Septic).  Discussed pt with Dr. Ricardo at the bedside and then again with Dr. Orellana.  D/t the pt's worsening clinical condition with profound frailty, the pt unfortunately is no longer a candidate for DENIS.    Plan:  - DENIS for 10/19/23 canceled  - Agree with goals of care convo with pt and family  - Agree with broad spectrum Anbx  - Medication management per HFICU team  - Structural will sign-off at this time, please contact us if there are any other questions  - Please re-engage  Structural if pt able to recover from this acute condition      D/w Dr. Orellana and Dr. Ricardo    I spent 50 minutes in the professional and overall care of this patient.     Facundo Delgado MSN, [facility]  Acute Care Nurse Practitioner  Structural Heart / TAVR Team  Structural Pager: [phone]  (Nights) ED fellow pager: [phone]

## 2023-10-17 NOTE — PROGRESS NOTES
HFICU Attending Note    Primary HF cardiologist: Martina WEAVER with a PMHx sig for stage C diastolic HF/HFpEF with associated severe valvular regurgitation (mitral and tricuspid) being evaluated for possible DENIS, AF on DOAC therapy, and CKD who was admitted with acute on chronic diastolic HF/HFpEF and transferred to the HFICU with concern for mixed cardiogenic/septic shock.     Progressive hypotension overnight despite multiple IV vasoactive agents. Minimal urine output today. Labs notable for worsening renal function and rising lactate. CXR with bilateral lower lobe infiltrates.      Discussed with his family this afternoon and the decision was to transition his care to comfort care.     Dr. Mack updated.       This critically ill patient continues to be at-risk for clinically significant deterioration / failure due to the above mentioned dysfunctional, unstable organ systems.  I have personally identified and managed all complex critical care issues to prevent aforementioned clinical deterioration.  Critical care time is spent at bedside and/or the immediate area and has included, but is not limited to, the review of diagnostic tests, labs, radiographs, serial assessments of hemodynamics, respiratory status, ventilatory management, and family updates.  Time spent in procedures and teaching are reported separately.    Critical care time: 70 minutes

## 2023-10-17 NOTE — PROGRESS NOTES
"Vancomycin Dosing by Pharmacy- INITIAL    Rajendra Chavis is a 88 y.o. year old male who Pharmacy has been consulted for vancomycin dosing for pneumonia. Based on the patient's indication and renal status this patient will be dosed based on a goal AUC of 400-600.     Renal function is currently stable. Scr elevated with CKD but appears stable.    Visit Vitals  /51   Pulse 58   Temp 35.4 °C (95.7 °F) (Temporal)   Resp 25        Lab Results   Component Value Date    CREATININE 1.76 (H) 10/17/2023    CREATININE 1.73 (H) 10/17/2023    CREATININE 1.46 (H) 10/16/2023    CREATININE 1.71 (H) 10/16/2023        Patient weight is 98.4 kg    No results found for: \"CULTURE\"     I/O last 3 completed shifts:  In: 890.2 (9 mL/kg) [P.O.:150; I.V.:690.2 (7 mL/kg); IV Piggyback:50]  Out: 975 (9.9 mL/kg) [Urine:975 (0.3 mL/kg/hr)]  Weight: 98.4 kg   [unfilled]    Lab Results   Component Value Date    PATIENTTEMP 37.0 10/17/2023    PATIENTTEMP 37.0 10/17/2023    PATIENTTEMP 37.0 10/17/2023          Assessment/Plan     Patient was ordered 1.5 gm LD that was given this AM. Patient also received 1 gm dose (unverified by pharmacy) on error for 2.5 mg total on AM of 10/17. Will hold further dosing at this time and check random level prior to resuming maintenance dose.       Follow-up level will be ordered on 10/18 at AM labs unless clinically indicated sooner.  Will continue to monitor renal function daily while on vancomycin and order serum creatinine at least every 48 hours if not already ordered.  Follow for continued vancomycin needs, clinical response, and signs/symptoms of toxicity.       Solitario Schroeder, PharmD       "

## 2023-10-18 LAB — GLUCOSE BLD MANUAL STRIP-MCNC: 79 MG/DL (ref 74–99)

## 2023-10-18 PROCEDURE — 99238 HOSP IP/OBS DSCHRG MGMT 30/<: CPT | Performed by: STUDENT IN AN ORGANIZED HEALTH CARE EDUCATION/TRAINING PROGRAM

## 2023-10-18 PROCEDURE — 2500000004 HC RX 250 GENERAL PHARMACY W/ HCPCS (ALT 636 FOR OP/ED): Performed by: NURSE PRACTITIONER

## 2023-10-18 RX ADMIN — Medication 75 MCG/HR: at 01:13

## 2023-10-18 ASSESSMENT — PAIN SCALES - GENERAL: PAINLEVEL_OUTOF10: 0 - NO PAIN

## 2023-10-18 NOTE — DISCHARGE SUMMARY
Discharge Diagnosis  Acute heart failure with preserved ejection fraction (CMS/HCC)    Issues Requiring Follow-Up  None    Hospital Course  Rajendra Chavis is a 88 y.o. male with PMHx of diastolic CHF (EF 50-55%), severe MR & TR, CKD 3, Afib on Eliquis, and R pretibial venous ulcer who presented to [facility] on 10/5/23 with complaints of worsened SOB, fatigue and abdominal/ leg edema. Patient was instructed to report to ED by his cardiologist for evaluation and admission to [facility] for optimization of his heart disease prior to DENIS scheduled on 10/19. Patient follows with Drs Martina, Jessy and Ramicone. Last echo in 5/2023 with EF 55%, severe MR & TR, mild AR. While awaiting a bed at [facility], patient was started on lasix ggt at rate of 5 on admission at [place]. Transfer to [facility] for further optimization of CHF prior to scheduled DENIS.      On admission to [facility], patient was a poor historian. I received further history from his wife, Sunita. Patient reports continued SOB with exertion, denying SOB at rest. He notes that his LE edema has been persistent. He denies any chest pain, palpitation, abdominal pain, fever, chills, or malaise. He voiced frustration about his multiple hospitalizations and being passed around team to team. He voiced some understanding about upcoming MitraClip; however further education was given at bedside about procedure.      His wife reports that Rajendra is very frustrated about his current state of health and is anxiously awaiting intervention. She notes that she has noticed gradual and persistent worsening LE swelling for quite some time now, not noticing improvement with his daily torsemide. She reports that she has kept to the strict 2g sodium and 2L fluid restriction at home to no avail. When asked about his typical baseline, she notes he is independent and was just driving and going to the grocery store a few days ago without limitation. When asked about his baseline and the observable word  slurring / trouble word finding on exam, she attributes to his current frustration. Dr. Mack confirmed that his is patient's baseline.   Patient has a RN that comes to the home every other day, wife confirms his HR is historically low in 40-50s with controlled SBP of 100-110s.     Floor Course  Patient originally started on lasix gtt + daily boluses and intermittent metolazone without significant UO. Switched to bumex gtt + boluses ##  Structural Team notified of admission, would prefer patient be optimized and discharged prior to procedure. ##    Afib with SVR HR 30-40s on tele, typical baseline 40-60s (confirmed by family and home care). Patient remained asymptomatic. EP consulted, who recommending cardioversion following aggressive diuresis ##.     Heme consulted for thrombocytopenia.     On 10/12, patient was noted to have acute swelling and redness of R arm and elbow proximal to IV (connected to lasix gtt). Area is non tender with palpation. Warm to touch. Instructed RN to removed R PIV and replace on L arm. ACE wrap applied to R arm, to remain elevated with PRN ice packs for swelling.     Wound care was consulted for updated recommendations on patient's preexisting R pretibial venous ulcer: recommended to keep legs elevated to reduce edema. Float heels. Change RLE (ankle/shin) dressing q3 days using Medihoney (order from [facility], oracle #[id number]), Adaptic (ie., Curity non-adherent strips), Aquacel Ag and Mepilex foam to secure. Apply lotion to dry periwound skin and LLE.      Patient transferred to HFICU on 10/16 due to symptomatic bradycardia. Required levophed + epinephrine to maintain MAPs>60. Acute hypoxemic respiratory failure noted on transfer in the setting of PNA/CHF; diuresis and antibiotics initiated. Worsening mixed type of shock in am of 10/17 requiring 3 pressures. Goals of care discussion in the afternoon with family; pt made comfort care. Passed away on 10/18 at 0120.      Pertinent Physical Exam  At Time of Discharge  n exam the patient did not respond to verbal or physical stimuli. Absent heart and breath sounds Absent peripheral pulses. Pupils are fixed and dilated.     Jason Monique MD

## 2023-10-18 NOTE — NURSING NOTE
Pt Comfort Care. No assessment performed. Comfort care measures only: pt clean, dry, and comfortable. Does not appear to be in any pain. Fentanyl gtt infusing. On 4L NC. [place] and ART line still remain. INR >5.

## 2023-10-18 NOTE — NURSING NOTE
Death within 24 hours of use of soft restraints logged on October 18, 2023 at 1101 hours per CMS requirements.

## 2023-10-18 NOTE — SIGNIFICANT EVENT
Called to see patient for unresponsiveness. On exam the patient did not respond to verbal or physical stimuli. Absent heart and breath sounds. Absent peripheral pulses. Pupils are fixed and dilated. Patient pronounced dead at 0120 (24 hour format).   Attempted to notify daughter but call was forwarded to voice mail.

## 2023-10-20 LAB
HIV1 RNA # PLAS NAA DL=20: NOT DETECTED {COPIES}/ML
HIV1 RNA SPEC NAA+PROBE-LOG#: NORMAL {LOG_COPIES}/ML

## 2023-10-21 LAB
BACTERIA BLD CULT: NORMAL
BACTERIA BLD CULT: NORMAL

## 2023-10-24 ENCOUNTER — HOSPITAL ENCOUNTER (OUTPATIENT)
Dept: CARDIOLOGY | Facility: HOSPITAL | Age: 88
Discharge: HOME | End: 2023-10-24
Payer: MEDICARE

## 2023-10-24 LAB
ATRIAL RATE: 214 BPM
Q ONSET: 222 MS
QRS COUNT: 6 BEATS
QRS DURATION: 108 MS
QT INTERVAL: 648 MS
QTC CALCULATION(BAZETT): 487 MS
QTC FREDERICIA: 536 MS
R AXIS: -47 DEGREES
T AXIS: 8 DEGREES
T OFFSET: 546 MS
VENTRICULAR RATE: 34 BPM

## 2023-10-26 ENCOUNTER — HOSPITAL ENCOUNTER (OUTPATIENT)
Dept: CARDIOLOGY | Facility: HOSPITAL | Age: 88
Discharge: HOME | End: 2023-10-26
Payer: MEDICARE

## 2023-10-26 LAB
ATRIAL RATE: 101 BPM
Q ONSET: 247 MS
QRS COUNT: 22 BEATS
QRS DURATION: 8 MS
QT INTERVAL: 258 MS
QTC CALCULATION(BAZETT): 387 MS
QTC FREDERICIA: 337 MS
R AXIS: 0 DEGREES
T AXIS: 149 DEGREES
T OFFSET: 376 MS
VENTRICULAR RATE: 135 BPM

## 2023-10-26 PROCEDURE — 93005 ELECTROCARDIOGRAM TRACING: CPT

## 2023-11-17 ENCOUNTER — APPOINTMENT (OUTPATIENT)
Dept: CARDIOLOGY | Facility: HOSPITAL | Age: 88
End: 2023-11-17
Payer: MEDICARE

## 2024-04-19 NOTE — OP NOTE
Preoperative diagnosis: Chronic right lower extremity wound  Postoperative diagnosis: Same  Procedure: Excisional debridement of right lower lateral leg ulcer for preparation for graft (9 cm x 5.5 cm).  Placement of myriad graft 7 cm x 10 cm right lower leg ulcer  Surgeon: Dr. Benjamin  Assistant: Raysa rader  Anesthesia: MAC  Estimated blood loss: Minimal  Specimen: Slough  Findings: Same      Report of procedure: After the procedure was fully explained to the patient and after adequate consent form was signed, the patient had an IV and received preoperative IV antibiotics.  The patient was then taken operating room placed in the supine position  and given sedation.  The entire right lower extremity from the knee down to the toes was cleaned prepped usual sterile fashion.  Using a LLUSTRE debrider, the right lower leg wound was aggressively debrided.  Sharp debridement was also performed using  a scalpel and curettes.  Bleeding was controlled using cautery.  At this time gloves were changed.  The wound was copiously irrigated with antibiotic solution.  A myriad graft that was 7 cm x 10 cm was placed over the wound and stapled into place.  Adaptic  was then placed over the myriad graft and sutured into place using 4-0 chromic.  4 x 4's Curlex and an Ace bandage was then applied.  The patient tolerated the procedure well.  No complications noted.  Instrument count needle count sponge counts correct.   Thank you very much this ends dictation     Electronic Signatures:  Jean Benjamin ()  (Signed on 30-Jul-2023 21:16)   Authored    Last Updated: 30-Jul-2023 21:16 by Jean Benjamin ()

## 2025-06-03 NOTE — PROGRESS NOTES
I received Rajendra Chavis in signout from Dr. Zhang.  Please see the previous note for all HPI, PE and MDM up to the time of signout at 1800.    In brief Rajendra Chavis is an 88 y.o. male presenting for   Chief Complaint   Patient presents with    Heart Problem     Hx of CHF, leg swelling, mitral valve issues.    .  At the time of signout we were awaiting:  Transfer to [facility] for optimization prior to valvular intervention.      Due to prolonged boarding in the [place] emergency department, plan to escalate care to hospitalization at [facility].  The prior physician discussed this with the patient.  The prior physician also reached out to the hospitalist, who asked that we admit to the evening hospitalist.    I discussed the patient with the hospitalist.  Admission order placed.    Pt Disposition: Admit       independent